# Patient Record
Sex: FEMALE | Race: WHITE
[De-identification: names, ages, dates, MRNs, and addresses within clinical notes are randomized per-mention and may not be internally consistent; named-entity substitution may affect disease eponyms.]

---

## 2017-03-18 ENCOUNTER — HOSPITAL ENCOUNTER (OUTPATIENT)
Dept: HOSPITAL 45 - C.RADBC | Age: 82
Discharge: HOME | End: 2017-03-18
Attending: INTERNAL MEDICINE
Payer: COMMERCIAL

## 2017-03-18 DIAGNOSIS — M16.10: ICD-10-CM

## 2017-03-18 DIAGNOSIS — M54.5: ICD-10-CM

## 2017-03-18 DIAGNOSIS — M81.0: ICD-10-CM

## 2017-03-18 DIAGNOSIS — Z00.00: Primary | ICD-10-CM

## 2017-03-18 LAB
ANION GAP SERPL CALC-SCNC: 9 MMOL/L (ref 3–11)
BUN SERPL-MCNC: 10 MG/DL (ref 7–18)
BUN/CREAT SERPL: 13.1 (ref 10–20)
CALCIUM SERPL-MCNC: 9.7 MG/DL (ref 8.5–10.1)
CHLORIDE SERPL-SCNC: 95 MMOL/L (ref 98–107)
CO2 SERPL-SCNC: 29 MMOL/L (ref 21–32)
CREAT SERPL-MCNC: 0.78 MG/DL (ref 0.6–1.2)
GLUCOSE SERPL-MCNC: 90 MG/DL (ref 70–99)
POTASSIUM SERPL-SCNC: 4.3 MMOL/L (ref 3.5–5.1)
SODIUM SERPL-SCNC: 133 MMOL/L (ref 136–145)

## 2017-03-18 NOTE — DIAGNOSTIC IMAGING REPORT
LUMBAR SPINE 5 VIEWS



HISTORY: Pain  M16.10 Hip [id number] Lower back [id number]



COMPARISON: None.



FINDINGS: There is no fracture. No subluxation. Moderate degenerative disc

changes throughout. Mild to moderate osteopenia.



IMPRESSION:  

Degenerative change. Osteopenia. No acute process.







Electronically signed by:  Evgeny Cardozo M.D.

3/18/2017 12:56 PM



Dictated Date/Time:  3/18/2017 12:56 PM

## 2017-03-18 NOTE — DIAGNOSTIC IMAGING REPORT
PELVIS/BILATERAL HIP 2 VIEWS



CLINICAL HISTORY: ARTHRITIS, HIP ARTHRITIS pain



COMPARISON STUDY:  None



FINDINGS: Severe degenerative change of both hips. No evidence for acetabular

protrusion. Sclerosis and subchondral cyst formation of the hips bilaterally.



IMPRESSION:  Severe degenerative change of both hips 









Electronically signed by:  Evgeny Cardozo M.D.

3/18/2017 1:01 PM



Dictated Date/Time:  3/18/2017 1:00 PM

## 2017-05-10 ENCOUNTER — HOSPITAL ENCOUNTER (OUTPATIENT)
Dept: HOSPITAL 45 - C.MAMM | Age: 82
Discharge: HOME | End: 2017-05-10
Attending: SURGERY
Payer: COMMERCIAL

## 2017-05-10 DIAGNOSIS — Z85.3: ICD-10-CM

## 2017-05-10 DIAGNOSIS — Z12.31: Primary | ICD-10-CM

## 2017-05-10 NOTE — MAMMOGRAPHY REPORT
BILATERAL DIGITAL SCREENING MAMMOGRAM TOMOSYNTHESIS WITH CAD: 5/10/2017

CLINICAL HISTORY: Asymptomatic. Personal history of breast cancer.  





TECHNIQUE:  Breast tomosynthesis in addition to standard 2D mammography was performed. Current study
 was also evaluated with a Computer Aided Detection (CAD) system.  



COMPARISON: Comparison is made to exams dated:  3/2/2016 mammogram, 5/1/2015 mammogram, 3/10/2015 ma
mmogram, 3/12/2014 mammogram, 6/26/2013 mammogram, and 7/3/2012 mammogram - [facility]
nter.   



BREAST COMPOSITION:  There are scattered areas of fibroglandular density in both breasts.  



FINDINGS:  No suspicious masses, calcifications, or areas of architectural distortion are noted in e
ither breast. There has been no significant interval change compared to prior exams.  There are stab
le postsurgical changes in the left upper outer quadrant from prior lumpectomy, including stable den
sity, architectural distortion, and surgical clips at the lumpectomy bed.  A linear scar marker amandeep
boubacar a scar on the left upper outer breast.  Scattered bilateral benign-appearing calcifications are 
not significantly changed.





IMPRESSION:  ACR BI-RADS CATEGORY 2: BENIGN

There is no mammographic evidence of malignancy. A 1 year screening mammogram is recommended.  The p
atient will receive written notification of the results.  





Approximately 10% of breast cancers are not detected with mammography. A negative mammographic repor
t should not delay biopsy if a clinically suggestive mass is present.



Ginger Ritter M.D.          

/:5/10/2017 12:31:34  



Attending Technologist: Jadyn Wilder RT(R)(M), [facility]

Imaging Technologist: Laurence Hickey RT(R)(M), [facility]

letter sent: Normal 1/2  

BI-RADS Code: ACR BI-RADS Category 2: Benign

## 2017-06-08 ENCOUNTER — HOSPITAL ENCOUNTER (OUTPATIENT)
Dept: HOSPITAL 45 - C.LAB1850 | Age: 82
Discharge: HOME | End: 2017-06-08
Attending: INTERNAL MEDICINE
Payer: COMMERCIAL

## 2017-06-08 DIAGNOSIS — M81.0: Primary | ICD-10-CM

## 2017-06-29 ENCOUNTER — HOSPITAL ENCOUNTER (EMERGENCY)
Dept: HOSPITAL 45 - C.EDA | Age: 82
Discharge: HOME | End: 2017-06-29
Payer: COMMERCIAL

## 2017-06-29 VITALS
BODY MASS INDEX: 26.93 KG/M2 | HEIGHT: 65.98 IN | HEIGHT: 65.98 IN | BODY MASS INDEX: 26.93 KG/M2 | WEIGHT: 167.55 LBS | WEIGHT: 167.55 LBS

## 2017-06-29 VITALS — DIASTOLIC BLOOD PRESSURE: 85 MMHG | TEMPERATURE: 98.24 F | SYSTOLIC BLOOD PRESSURE: 144 MMHG | OXYGEN SATURATION: 99 %

## 2017-06-29 DIAGNOSIS — I10: ICD-10-CM
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
## 2017-06-29 DIAGNOSIS — T78.40XA: Primary | ICD-10-CM

## 2017-06-29 DIAGNOSIS — Z96.659: ICD-10-CM

## 2017-06-29 DIAGNOSIS — Z85.3: ICD-10-CM

## 2017-06-29 DIAGNOSIS — G47.30: ICD-10-CM

## 2017-06-29 NOTE — EMERGENCY ROOM VISIT NOTE
History


First contact with patient:  01:36


Chief Complaint:  ALLERGIC REACTION


Stated Complaint:  ALLERGIC REACTION


Nursing Triage Summary:  


patient states prior to arrival her lower lip became swollen and numb and 


believed she was having an allergic reaction. patient states she had already 


taken children's benadryl as a part of her normal nighttime medication and 


patient states symptoms resolved priro to arrival. denies any respiratoy 


complaints at this time and respirations wnl . 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 


idiopathic angioedema.





History of Present Illness


The patient is a 82 year old female who presents to the Emergency Room with 

complaints of lip swelling that has now resolved.  She had a sudden onset of 

symptoms prior to arrival then took benadryl.  The patient noted using an oral 

moisturizer just prior to the event. She has had allergic reactions and 

angioedema in the past for the 30 yrs. She is currently feeling better only 

noting minimal left lower lip edema.  She denies pain.  Pt denies LOC, headache

, fevers, chills, diaphoresis, visual changes, neck pain, chest pain, breathing 

difficulties, nausea, vomiting, abdominal pain, back pain, melena, hematochezia

, urinary symptoms, numbness, weakness, lymphadenopathy, rash, or other 

complaints.





Review of Systems


See HPI for pertinent positives and negatives.  A total of ten systems were 

reviewed and were otherwise negative.





Past Medical/Surgical History


Medical Problems:


(1) Benign hypertension


(2) breast cancer


(3) idiopathic angioedema 


(4) Replacement of total knee joint


(5) Sleep apnea








Social History


Smoking Status:  Never Smoker


Marital Status:  single


Housing Status:  lives alone





Current/Historical Medications


Scheduled


Acetaminophen/Diphenhydramine (Tylenol Pm), 2 TABS PO HS


Ascorbic Acid (Vitamin C), 250 MG PO DAILY


Epinephrine (Epipen *), 0.3 MG IM UD


Ergocalciferol (Vitamin D), 1 CAP PO DAILY


Hydrochlorothiazide (Hctz), 12.5 MG PO DAILY


Loratadine (Claritin), 10 MG PO DAILY


Prednisone (Prednisone), 40 MG PO DAILY


Psyllium (Metamucil Powder), 1 PACK PO PRN UD


Verapamil (Calan), 80 MG PO TID PRN





Scheduled PRN


Acetaminophen Tab (Tylenol), 650 MG PO TID PRN





Allergies


Coded Allergies:  


     Aspirin (Verified  Allergy, Severe, TONGUE AND THROAT SWELLS, 10/26/16)


     Cephalexin (Verified  Allergy, Severe, swelling tongue and throat, 10/26/16

)


     Magnesium Salicylate (Verified  Allergy, Severe, ANAPHYLAXIS, 10/26/16)


     Omeprazole (Verified  Allergy, Severe, ANAPHYLAXIS, 10/26/16)


     Tramadol (Verified  Allergy, Severe, ANAPHYLAXIS, 10/26/16)


     Erythromycin (Verified  Allergy, Unknown, SWELLING AND RASH, 10/26/16)


     Oxycodone (Verified  Allergy, Unknown, UNKNOWN, 10/26/16)


     Tetracycline (Verified  Allergy, Unknown, SWELLING AND RASH, 10/26/16)


     Alendronate (Verified  Adverse Reaction, Unknown, very upset stomach, 10/26

/16)


Uncoded Allergies:  


     perfumes (Allergy, Unknown, can't breathe, 10/1/12)





Physical Exam


Vital Signs











  Date Time  Temp Pulse Resp B/P (MAP) Pulse Ox O2 Delivery O2 Flow Rate FiO2


 


6/29/17 01:16      Room Air  


 


6/29/17 01:16 36.8  20 144/85 99 Room Air  











Physical Exam


GENERAL: Awake, alert, well-appearing, in no distress


HENT: Normocephalic, atraumatic. Oropharynx unremarkable.  No edema or 

swelling.  Voice normal.


EYES: Normal conjunctiva. Sclera non-icteric.


NECK: Supple. No nuchal rigidity. FROM. No JVD.  No stridor.


RESPIRATORY: Clear to auscultation.


CARDIAC: Regular rate, normal rhythm. Extremities warm and well perfused. 

Pulses equal.


ABDOMEN: Soft, non-distended. No tenderness to palpation. No rebound or 

guarding. No masses.


RECTAL: Deferred.


MUSCULOSKELETAL: Chest examination reveals no tenderness. The back is 

symmetrical on inspection without obvious abnormality. There is no CVA 

tenderness to palpation. No joint edema. 


LOWER EXTREMITIES: Calves are equal size bilaterally and non-tender. No edema. 

No discoloration. 


NEURO: Normal sensorium. No sensory or motor deficits noted. 


SKIN: No rash or jaundice noted.





Medical Decision & Procedures


Medications Administered











 Medications


  (Trade)  Dose


 Ordered  Sig/Hanane


 Route  Start Time


 Stop Time Status Last Admin


Dose Admin


 


 Prednisone


  (PredniSONE TAB)  40 mg  NOW  STAT


 PO  6/29/17 01:43


 6/29/17 01:45 DC 6/29/17 01:49


40 MG











Medical Decision


Prior records/ancillary studies reviewed.





Triage Nursing notes reviewed and agree them.





The patient's history was concerning for possible allergic reaction. 





Differential diagnosis:


Etiologies such as allergic reaction, anaphylaxis, urticaria, Sanchez-Boyd 

syndrome, toxic epidermal necrolysis, angioedema, cellulitis, as well as others 

were entertained.  





Physical examination:


As above. 





ER treatment provided:


Continuous cardiac monitoring


Oral prednisone





On reassessment the patient felt normal.





Diagnostic interpretation by me:


None ordered








It appears the patient had a Very mild allergic reaction.  The above treatment 

did well.  After prolonged monitoring and frequent reassessments the patient 

did very well and symptoms did not return.


By the evaluation outlined above emergent etiologies such as airway compromise, 

Sanchez-Boyd syndrome, toxic epidermal necrolysis, erythema multiforme, 

cellulitis, as well as others were deemed relatively unlikely.  





The patient was informed about the findings as listed above.  All questions 

were answered and she was pleased with the treatment.  Return instructions were 

outlined and the patient was discharged in stable condition.  





Outpatient prescription management:


The patient has an EpiPen


prednisone





Referral:





The patient was referred back to her primary care physician for follow-up in 2-

3 days for a recheck of the current condition.





Impression





 Primary Impression:  


 Allergic reaction





Departure Information


Dispostion


Home / Self-Care





Prescriptions





Prednisone (Prednisone) 20 Mg Tab


40 MG PO DAILY for 2 Days, #4 TAB


   Prov: Wilbert Cadena MD         6/29/17





Referrals


Keon Gross M.D. (PCP)





Patient Instructions


My [facility]

## 2017-07-28 ENCOUNTER — HOSPITAL ENCOUNTER (INPATIENT)
Dept: HOSPITAL 45 - C.EDA | Age: 82
LOS: 2 days | Discharge: HOME | DRG: 69 | End: 2017-07-30
Attending: HOSPITALIST | Admitting: HOSPITALIST
Payer: COMMERCIAL

## 2017-07-28 VITALS
WEIGHT: 169.98 LBS | HEIGHT: 65.98 IN | WEIGHT: 169.98 LBS | BODY MASS INDEX: 27.32 KG/M2 | BODY MASS INDEX: 27.32 KG/M2 | HEIGHT: 65.98 IN

## 2017-07-28 DIAGNOSIS — M16.11: ICD-10-CM

## 2017-07-28 DIAGNOSIS — Z87.891: ICD-10-CM

## 2017-07-28 DIAGNOSIS — E87.5: ICD-10-CM

## 2017-07-28 DIAGNOSIS — I10: ICD-10-CM

## 2017-07-28 DIAGNOSIS — G47.00: ICD-10-CM

## 2017-07-28 DIAGNOSIS — E78.5: ICD-10-CM

## 2017-07-28 DIAGNOSIS — G45.9: Primary | ICD-10-CM

## 2017-07-28 DIAGNOSIS — F41.9: ICD-10-CM

## 2017-07-28 LAB
ALP SERPL-CCNC: 94 U/L (ref 45–117)
ALT SERPL-CCNC: 16 U/L (ref 12–78)
ANION GAP SERPL CALC-SCNC: 8 MMOL/L (ref 3–11)
APPEARANCE UR: CLEAR
AST SERPL-CCNC: 13 U/L (ref 15–37)
BASOPHILS # BLD: 0.02 K/UL (ref 0–0.2)
BASOPHILS NFR BLD: 0.3 %
BILIRUB UR-MCNC: (no result) MG/DL
BUN SERPL-MCNC: 12 MG/DL (ref 7–18)
BUN/CREAT SERPL: 13.8 (ref 10–20)
CALCIUM SERPL-MCNC: 9.2 MG/DL (ref 8.5–10.1)
CHLORIDE SERPL-SCNC: 94 MMOL/L (ref 98–107)
CKMB/CK RATIO: 2.3 (ref 0–3)
CO2 SERPL-SCNC: 28 MMOL/L (ref 21–32)
COLOR UR: YELLOW
COMPLETE: YES
CREAT CL PREDICTED SERPL C-G-VRATE: 55.3 ML/MIN
CREAT SERPL-MCNC: 0.86 MG/DL (ref 0.6–1.2)
EOSINOPHIL NFR BLD AUTO: 272 K/UL (ref 130–400)
GLUCOSE SERPL-MCNC: 114 MG/DL (ref 70–99)
HCT VFR BLD CALC: 38.6 % (ref 37–47)
IG%: 0.2 %
IMM GRANULOCYTES NFR BLD AUTO: 36.9 %
INR PPP: 1 (ref 0.9–1.1)
LYMPHOCYTES # BLD: 2.26 K/UL (ref 1.2–3.4)
MANUAL MICROSCOPIC REQUIRED?: NO
MCH RBC QN AUTO: 31.7 PG (ref 25–34)
MCHC RBC AUTO-ENTMCNC: 35 G/DL (ref 32–36)
MCV RBC AUTO: 90.6 FL (ref 80–100)
MONOCYTES NFR BLD: 9.1 %
NEUTROPHILS # BLD AUTO: 1.5 %
NEUTROPHILS NFR BLD AUTO: 52 %
NITRITE UR QL STRIP: (no result)
PARTIAL THROMBOPLASTIN RATIO: 1.3
PH UR STRIP: 8.5 [PH] (ref 4.5–7.5)
PMV BLD AUTO: 9.1 FL (ref 7.4–10.4)
POTASSIUM SERPL-SCNC: 4.1 MMOL/L (ref 3.5–5.1)
PROTHROMBIN TIME: 10.7 SECONDS (ref 9–12)
RBC # BLD AUTO: 4.26 M/UL (ref 4.2–5.4)
REVIEW REQ?: NO
SODIUM SERPL-SCNC: 130 MMOL/L (ref 136–145)
SP GR UR STRIP: 1.01 (ref 1–1.03)
URINE BILL WITH OR WITHOUT MIC: (no result)
UROBILINOGEN UR-MCNC: (no result) MG/DL
WBC # BLD AUTO: 6.13 K/UL (ref 4.8–10.8)
ZZUR CULT IF INDIC CLEAN CATCH: NO

## 2017-07-28 RX ADMIN — SODIUM CHLORIDE SCH MLS/HR: 900 INJECTION, SOLUTION INTRAVENOUS at 21:34

## 2017-07-28 NOTE — EMERGENCY ROOM VISIT NOTE
History


Report prepared by Onofre:  Valeria Mcleod


Under the Supervision of:  Dr. Trevor Madrigal D.O.


First contact with patient:  21:03


Chief Complaint:  TIA


Stated Complaint:  TIA SYMPTOMS





History of Present Illness


The patient is an 82 year old female who presents to the Emergency Room with 

complaints of improving left sided weakness which started at 1900 today. The 

patient started having difficulty walking at 1900. Her left leg seemed to be 

dragging behind her. She waited to see if the symptoms would resolve, but they 

did not so she called EMS. Her symptoms had begun to improve by the time EMS 

arrived. She reports some stiffness in her left hand, numbness in her left lip, 

dizziness, lightheadedness, and headache. She reports feeling "fuzzy" in the 

head. She always has some pain in her left leg because of her knee replacement. 

She denies any swelling in her legs. She has difficulty with her right hip and 

will have a hip replacement in the future. She has a history of aortic aneurysm

, high cholesterol, and sleep apnea. She denies any history of stroke. She wore 

a heart monitor a couple months ago which was normal. She is not on any blood 

thinners.





   Source of History:  patient


   Onset:  1900 today


   Position:  other (left sided)


   Quality:  other (weakness)


   Timing:  other (improving)


   Associated Symptoms:  + numbness (left lip)


Note:


Pt reports trouble walking, left leg dragging, stiffness in left hand, dizziness

, lightheadedness, fuzzy in the head. Pt denies leg swelling.





Review of Systems


See HPI for pertinent positives & negatives. A total of 10 systems reviewed and 

were otherwise negative.





Past Medical & Surgical


Medical Problems:


(1) Benign hypertension


(2) breast cancer


(3) idiopathic angioedema 


(4) Replacement of total knee joint


(5) Sleep apnea








Family History


Noncontributory secondary to age.





Social History


Smoking Status:  Former Smoker


Marital Status:  


Housing Status:  lives alone





Current/Historical Medications


Scheduled


Azelastine Hcl (Astelin Nasal Spray), 2 SPRAYS DIEGO BID


Calcium (Calcium), 1 TAB PO DAILY


Cholecalciferol (Vitamin D3), 2,000 INTER.UNIT PO DAILY


Glucosamine-Chondroitin-Vit C- (Glucosamine Chondroitin), 1 TAB PO DAILY


Hydrochlorothiazide (Hctz), 12.5 MG PO DAILY


Loratadine (Claritin), 10 MG PO DAILY


Timolol Maleate (Timolol Maleate), 1 DROP OPB QAM





Scheduled PRN


Acetaminophen Tab (Tylenol), 650 MG PO TID PRN for Pain


Epinephrine (Epipen), 0.3 MG IM UD PRN for ALLERGIC REACTION





Allergies


Coded Allergies:  


     Aspirin (Verified  Allergy, Severe, TONGUE AND THROAT SWELLS, 10/26/16)


     Cephalexin (Verified  Allergy, Severe, swelling tongue and throat, 10/26/16

)


     Magnesium Salicylate (Verified  Allergy, Severe, ANAPHYLAXIS, 10/26/16)


     Omeprazole (Verified  Allergy, Severe, ANAPHYLAXIS, 10/26/16)


     Tramadol (Verified  Allergy, Severe, ANAPHYLAXIS, 10/26/16)


     Erythromycin (Verified  Allergy, Unknown, SWELLING AND RASH, 10/26/16)


     Oxycodone (Verified  Allergy, Unknown, UNKNOWN, 10/26/16)


     Tetracycline (Verified  Allergy, Unknown, SWELLING AND RASH, 10/26/16)


     Alendronate (Verified  Adverse Reaction, Unknown, very upset stomach, 10/26

/16)


Uncoded Allergies:  


     perfumes (Allergy, Unknown, can't breathe, 10/1/12)





Physical Exam


Vital Signs











  Date Time  Temp Pulse Resp B/P (MAP) Pulse Ox O2 Delivery O2 Flow Rate FiO2


 


7/28/17 22:56  64 20 171/89 98 Room Air  


 


7/28/17 21:21      Room Air  


 


7/28/17 20:55  86      


 


7/28/17 20:48 37.1 65 18 149/71 97 Room Air  











Physical Exam


GENERAL:  Patient is awake, alert, and in no acute distress. Patient is resting 

comfortably and showing no signs of anxiety


EYES: The conjunctivae are clear.  The pupils are round and reactive. 


EARS, NOSE, MOUTH AND THROAT: The nose is without any evidence of any 

deformity. Mucous membranes are moist tongue is midline 


NECK: The neck is nontender and supple.


RESPIRATORY: Normal respiratory effort is noted there is no evidence of 

wheezing rhonchi or rales


CARDIOVASCULAR:  Regular rate and rhythm noted there no murmurs rubs or gallops 

normal S1 normal S2 


GASTROINTESTINAL: The abdomen is soft. Bowel sounds are present in all 

quadrants. Abdomen is nontender


MUSCULOSKELETAL/EXTREMITIES: There is no evidence of gross deformity full range 

of motion is noted in the hips and shoulders


SKIN: There is pedal edema bilaterally.


NEUROLOGIC:  Patient is awake alert and oriented x3, no facial droop was 

appreciated, subtle drift in the left upper extremity, but  strength is 

symmetric, strength in lower extremities diminished but appearing symmetric.





Medical Decision & Procedures


ER Provider


Diagnostic Interpretation:


X-ray results as stated below per interpretation by me and the radiologist. 

Radiology results as stated below per my review and radiologist interpretation:





CHEST ONE VIEW PORTABLE





CLINICAL HISTORY: Stroke mental status change





COMPARISON STUDY:  11/11/2012





FINDINGS: The bones soft tissues and hemidiaphragms are normal. The


cardiomediastinal silhouette is normal. The lungs are clear. The pulmonary


vasculature is normal. 





IMPRESSION:  Negative chest. 





The above report was generated using voice recognition software.  It may contain


grammatical, syntax or spelling errors.





Electronically signed by:  Evgeny Cardozo M.D.


7/28/2017 9:30 PM





Dictated Date/Time:  7/28/2017 9:30 PM








HEAD WITHOUT CONTRAST (CT)





CT DOSE: 638.56 mGycm





HISTORY: Mental status change  Stroke





TECHNIQUE: Multiaxial CT images of the head were performed without the use of


intravenous contrast.  A dose lowering technique was utilized adhering to the


principles of ALARA.








Comparison: None.





Findings: The paranasal sinuses and mastoid air cells are clear. Mild chronic


small vessel change of aging. Ventricular system is midline. No evidence for


acute intracranial hemorrhage.





Impression:


Age-related change. No acute process. 





The above report was generated using voice recognition software.  It may contain


grammatical, syntax or spelling errors.





Electronically signed by:  Evgeny Cardozo M.D.


7/28/2017 9:54 PM





Dictated Date/Time:  7/28/2017 9:52 PM





Laboratory Results











Test


  7/28/17


20:45 7/28/17


21:34 7/28/17


22:00


 


Prothrombin Time


  10.7 SECONDS


(9.0-12.0) 


  


 


 


Prothromb Time International


Ratio 1.0 (0.9-1.1) 


  


  


 


 


Activated Partial


Thromboplast Time 32.5 SECONDS


(21.0-31.0) 


  


 


 


Partial Thromboplastin Ratio 1.3   


 


Estimated Average Glucose 114 mg/dl   


 


Hemoglobin A1c


  5.6 %


(4.5-5.6) 


  


 


 


Total Bilirubin


  0.6 mg/dl


(0.2-1) 


  


 


 


Direct Bilirubin


  0.1 mg/dl


(0-0.2) 


  


 


 


Aspartate Amino Transf


(AST/SGOT) 13 U/L (15-37) 


  


  


 


 


Alanine Aminotransferase


(ALT/SGPT) 16 U/L (12-78) 


  


  


 


 


Alkaline Phosphatase


  94 U/L


() 


  


 


 


Total Protein


  7.5 gm/dl


(6.4-8.2) 


  


 


 


Albumin


  3.7 gm/dl


(3.4-5.0) 


  


 


 


Bedside Prothrombin Time INR  1.0 (0.9-1.1)  


 


Urine Color   YELLOW 


 


Urine Appearance   CLEAR (CLEAR) 


 


Urine pH   8.5 (4.5-7.5) 


 


Urine Specific Gravity


  


  


  1.010


(1.000-1.030)


 


Urine Protein   NEG (NEG) 


 


Urine Glucose (UA)   NEG (NEG) 


 


Urine Ketones   NEG (NEG) 


 


Urine Occult Blood   NEG (NEG) 


 


Urine Nitrite   NEG (NEG) 


 


Urine Bilirubin   NEG (NEG) 


 


Urine Urobilinogen   NEG (NEG) 


 


Urine Leukocyte Esterase   SMALL (NEG) 


 


Urine WBC (Auto)   1-5 /hpf (0-5) 


 


Urine RBC (Auto)   0-4 /hpf (0-4) 


 


Urine Hyaline Casts (Auto)   0 /lpf (0-5) 


 


Urine Epithelial Cells (Auto)


  


  


  10-20 /lpf


(0-5)


 


Urine Bacteria (Auto)   NEG (NEG) 





Laboratory results per my review.





Medications Administered











 Medications


  (Trade)  Dose


 Ordered  Sig/Hanane


 Route  Start Time


 Stop Time Status Last Admin


Dose Admin


 


 Sodium Chloride  1,000 ml @ 


 50 mls/hr  Q20H


 IV  7/28/17 21:14


 7/29/17 18:13 DC 7/29/17 17:42


50 MLS/HR











ECG


Indication:  weakness


Rate (beats per minute):  70


Rhythm:  normal sinus


Findings:  no ectopy, other (no acute ST segment abnormalities)


Comparison ECG Date:  13-Nov-2012


Change:  no significant change





ED Course


2113: The patient was evaluated in room A11B. A complete history and physical 

examination were performed. 





2114: NSS 1000 ml @ 50 mls/hr IV. 





2214: Upon reevaluation, the patient is resting comfortably. I discussed 

results and treatment plan with her. She verbalizes agreement and 

understanding. The patient will be evaluated for further management and care. 





2243: I discussed the patient's case with Dr. Matos, [facility] hospitalist. 

The patient will be evaluated for further management.





Medical Decision


Prior records/ancillary studies reviewed and summarized above.


Nursing notes reviewed.


The patient's history was concerning for weakness.





Differential diagnosis:


Etiologies such as metabolic, infection, hypo/hyperglycemia, electrolyte 

abnormalities, cardiac sources, intracerebral event, toxicologic, neurologic, 

as well as others were entertained. 





The patient is an 82-year-old female who presented to the emergency department 

for an evaluation of left-sided weakness. The patient had significant 

improvement of her symptoms. I discussed the patient's laboratory and 

radiographic studies with her. The patient does not appear to be a candidate 

for TPA at this time. I discussed the patient's condition with the on-call 

[facility] hospitalist group. She may require further evaluation as an 

inpatient to determine the cause of her symptoms. The patient was agreeable to 

this plan.





Medication Reconcilliation


Current Medication List:  was personally reviewed by me





Blood Pressure Screening


Patient's blood pressure:  Elevated blood pressure


Blood pressure disposition:  Elevated BP felt to be situational





Consults


Time Called:  2230


Consulting Physician:  Dr. Matos, [facility] hospitalist


Returned Call:  2243


I discussed the patient's case with him. The patient will be evaluated for 

further management.





Impression





 Primary Impression:  


 Left-sided weakness


 Additional Impression:  


 Hyponatremia





Scribe Attestation


The scribe's documentation has been prepared under my direction and personally 

reviewed by me in its entirety. I confirm that the note above accurately 

reflects all work, treatment, procedures, and medical decision making performed 

by me.





Departure Information


Dispostion


Being Evaluated By Hospitalist





Referrals


Keon Gross M.D. (PCP)





Patient Instructions


My [facility]





Problem Qualifiers

## 2017-07-28 NOTE — DIAGNOSTIC IMAGING REPORT
CHEST ONE VIEW PORTABLE



CLINICAL HISTORY: Stroke mental status change



COMPARISON STUDY:  11/11/2012



FINDINGS: The bones soft tissues and hemidiaphragms are normal. The

cardiomediastinal silhouette is normal. The lungs are clear. The pulmonary

vasculature is normal. 



IMPRESSION:  Negative chest. 











The above report was generated using voice recognition software.  It may contain

grammatical, syntax or spelling errors.









Electronically signed by:  Evgeny Cardozo M.D.

7/28/2017 9:30 PM



Dictated Date/Time:  7/28/2017 9:30 PM

## 2017-07-28 NOTE — DIAGNOSTIC IMAGING REPORT
HEAD WITHOUT CONTRAST (CT)



CT DOSE: 638.56 mGycm



HISTORY: Mental status change  Stroke



TECHNIQUE: Multiaxial CT images of the head were performed without the use of

intravenous contrast.  A dose lowering technique was utilized adhering to the

principles of ALARA.





Comparison: None.



Findings: The paranasal sinuses and mastoid air cells are clear. Mild chronic

small vessel change of aging. Ventricular system is midline. No evidence for

acute intracranial hemorrhage.



Impression:

Age-related change. No acute process. 











The above report was generated using voice recognition software.  It may contain

grammatical, syntax or spelling errors.







Electronically signed by:  Evgeny Cardozo M.D.

7/28/2017 9:54 PM



Dictated Date/Time:  7/28/2017 9:52 PM

## 2017-07-29 VITALS
TEMPERATURE: 98.06 F | DIASTOLIC BLOOD PRESSURE: 95 MMHG | SYSTOLIC BLOOD PRESSURE: 185 MMHG | HEART RATE: 72 BPM | OXYGEN SATURATION: 97 %

## 2017-07-29 VITALS
TEMPERATURE: 98.24 F | SYSTOLIC BLOOD PRESSURE: 144 MMHG | HEART RATE: 58 BPM | OXYGEN SATURATION: 97 % | DIASTOLIC BLOOD PRESSURE: 57 MMHG

## 2017-07-29 VITALS — OXYGEN SATURATION: 98 %

## 2017-07-29 VITALS
TEMPERATURE: 97.88 F | DIASTOLIC BLOOD PRESSURE: 75 MMHG | OXYGEN SATURATION: 98 % | HEART RATE: 66 BPM | SYSTOLIC BLOOD PRESSURE: 122 MMHG

## 2017-07-29 VITALS
TEMPERATURE: 98.06 F | DIASTOLIC BLOOD PRESSURE: 71 MMHG | SYSTOLIC BLOOD PRESSURE: 117 MMHG | OXYGEN SATURATION: 96 % | HEART RATE: 70 BPM

## 2017-07-29 VITALS
SYSTOLIC BLOOD PRESSURE: 143 MMHG | DIASTOLIC BLOOD PRESSURE: 86 MMHG | TEMPERATURE: 98.42 F | HEART RATE: 58 BPM | OXYGEN SATURATION: 96 %

## 2017-07-29 VITALS
DIASTOLIC BLOOD PRESSURE: 69 MMHG | OXYGEN SATURATION: 97 % | SYSTOLIC BLOOD PRESSURE: 122 MMHG | TEMPERATURE: 97.52 F | HEART RATE: 54 BPM

## 2017-07-29 VITALS — HEART RATE: 77 BPM | OXYGEN SATURATION: 96 %

## 2017-07-29 VITALS
HEART RATE: 64 BPM | SYSTOLIC BLOOD PRESSURE: 117 MMHG | TEMPERATURE: 97.88 F | DIASTOLIC BLOOD PRESSURE: 77 MMHG | OXYGEN SATURATION: 98 %

## 2017-07-29 VITALS — DIASTOLIC BLOOD PRESSURE: 75 MMHG | SYSTOLIC BLOOD PRESSURE: 121 MMHG

## 2017-07-29 LAB
ANION GAP SERPL CALC-SCNC: 3 MMOL/L (ref 3–11)
BASOPHILS # BLD: 0.02 K/UL (ref 0–0.2)
BASOPHILS NFR BLD: 0.4 %
BUN SERPL-MCNC: 8 MG/DL (ref 7–18)
BUN/CREAT SERPL: 11 (ref 10–20)
CALCIUM SERPL-MCNC: 9.3 MG/DL (ref 8.5–10.1)
CHLORIDE SERPL-SCNC: 102 MMOL/L (ref 98–107)
CHOLEST/HDLC SERPL: 4.5 {RATIO}
CKMB/CK RATIO: 1.9 (ref 0–3)
CKMB/CK RATIO: 2.5 (ref 0–3)
CO2 SERPL-SCNC: 32 MMOL/L (ref 21–32)
COMPLETE: YES
CREAT CL PREDICTED SERPL C-G-VRATE: 64.6 ML/MIN
CREAT SERPL-MCNC: 0.72 MG/DL (ref 0.6–1.2)
EOSINOPHIL NFR BLD AUTO: 232 K/UL (ref 130–400)
EST. AVERAGE GLUCOSE BLD GHB EST-MCNC: 114 MG/DL
GLUCOSE SERPL-MCNC: 98 MG/DL (ref 70–99)
GLUCOSE UR QL: 52 MG/DL
HCT VFR BLD CALC: 39.4 % (ref 37–47)
IG%: 0.2 %
IMM GRANULOCYTES NFR BLD AUTO: 34.3 %
KETONES UR QL STRIP: 152 MG/DL
LYMPHOCYTES # BLD: 1.87 K/UL (ref 1.2–3.4)
MCH RBC QN AUTO: 31.1 PG (ref 25–34)
MCHC RBC AUTO-ENTMCNC: 34.3 G/DL (ref 32–36)
MCV RBC AUTO: 90.8 FL (ref 80–100)
MONOCYTES NFR BLD: 9.5 %
NEUTROPHILS # BLD AUTO: 1.7 %
NEUTROPHILS NFR BLD AUTO: 53.9 %
NITRITE UR QL STRIP: 149 MG/DL (ref 0–150)
PH UR: 234 MG/DL (ref 0–200)
PMV BLD AUTO: 9 FL (ref 7.4–10.4)
POTASSIUM SERPL-SCNC: 3.8 MMOL/L (ref 3.5–5.1)
RBC # BLD AUTO: 4.34 M/UL (ref 4.2–5.4)
SODIUM SERPL-SCNC: 137 MMOL/L (ref 136–145)
VERY LOW DENSITY LIPOPROT CALC: 30 MG/DL
WBC # BLD AUTO: 5.45 K/UL (ref 4.8–10.8)

## 2017-07-29 RX ADMIN — ACETAMINOPHEN PRN MG: 325 TABLET ORAL at 16:15

## 2017-07-29 RX ADMIN — HEPARIN SODIUM SCH UNIT: 10000 INJECTION, SOLUTION INTRAVENOUS; SUBCUTANEOUS at 21:00

## 2017-07-29 RX ADMIN — ACETAMINOPHEN PRN MG: 325 TABLET ORAL at 02:04

## 2017-07-29 RX ADMIN — Medication SCH INTER.UNIT: at 07:58

## 2017-07-29 RX ADMIN — SODIUM CHLORIDE SCH MLS/HR: 900 INJECTION, SOLUTION INTRAVENOUS at 17:42

## 2017-07-29 RX ADMIN — ALUMINUM ZIRCONIUM TRICHLOROHYDREX GLY SCH EA: 0.2 STICK TOPICAL at 07:56

## 2017-07-29 RX ADMIN — Medication SCH MG: at 07:58

## 2017-07-29 RX ADMIN — ACETAMINOPHEN PRN MG: 325 TABLET ORAL at 11:21

## 2017-07-29 RX ADMIN — ACETAMINOPHEN PRN MG: 325 TABLET ORAL at 20:35

## 2017-07-29 RX ADMIN — ALUMINUM ZIRCONIUM TRICHLOROHYDREX GLY SCH EA: 0.2 STICK TOPICAL at 16:00

## 2017-07-29 RX ADMIN — TIMOLOL MALEATE SCH DROPS: 6.8 SOLUTION/ DROPS OPHTHALMIC at 07:58

## 2017-07-29 RX ADMIN — Medication SCH TAB: at 16:15

## 2017-07-29 RX ADMIN — Medication SCH TAB: at 07:57

## 2017-07-29 RX ADMIN — ACETAMINOPHEN PRN MG: 325 TABLET ORAL at 06:26

## 2017-07-29 NOTE — PROGRESS NOTE
Subjective


Date of Service:


Jul 29, 2017.


Subjective


Pt evaluation today including:  conversation w/ patient, physical exam, chart 

review, lab review, review of inpatient medication list


feeling better.  notes no other new numbness/weakness. notes shes pretty sure 

it was her hip all along - notes hip pain then foot/leg dragging, then thinks 

the trouble typingwasn't from weakness/numbness, just from anxiety about what 

might be going on.  lip tingling b/l but no unilateral numbness.  





discussed at length





discusssed lipids, +/- statin, discussed possible TIA vs hip issues followed by 

anxiety and +/- plavix (since asa allergy)





awaiting MRI





Problem List


Medical Problems:


(1) Hyponatremia


Status: Acute  





(2) Left-sided weakness


Status: Acute  











Review of Systems


all other ROS otherwise negative except for as above





Objective


Vital Signs











  Date Time  Temp Pulse Resp B/P (MAP) Pulse Ox O2 Delivery O2 Flow Rate FiO2


 


7/29/17 14:50 36.8 58 20 144/57 (86) 97 Room Air  


 


7/29/17 11:53 36.4 54 19 122/69 (86) 97 Room Air  


 


7/29/17 07:50 36.9 58 18 143/86 (105) 96 Room Air  


 


7/29/17 04:00     98 Room Air  


 


7/29/17 03:34 36.6 64 18 117/77 (90) 98 Room Air  


 


7/29/17 01:40 36.7 72 20 185/95 97 Room Air  


 


7/28/17 23:57  70 18 152/92 97 Room Air  


 


7/28/17 22:56  64 20 171/89 98 Room Air  


 


7/28/17 21:21      Room Air  


 


7/28/17 20:55  86      


 


7/28/17 20:48 37.1 65 18 149/71 97 Room Air  











Physical Exam


General Appearance:  no apparent distress


Eyes:  EOMI


ENT:  hearing grossly normal


Neck:  trachea midline


Respiratory/Chest:  no respiratory distress, no accessory muscle use


Neurologic/Psychiatric:  CNs II-XII nml as tested, no motor/sensory deficits, 

alert, normal mood/affect


Skin:  normal color, warm/dry





Laboratory Results





Last 24 Hours








Test


  7/28/17


20:45 7/28/17


21:34 7/28/17


22:00 7/29/17


05:31


 


White Blood Count 6.13 K/uL    5.45 K/uL 


 


Red Blood Count 4.26 M/uL    4.34 M/uL 


 


Hemoglobin 13.5 g/dL    13.5 g/dL 


 


Hematocrit 38.6 %    39.4 % 


 


Mean Corpuscular Volume 90.6 fL    90.8 fL 


 


Mean Corpuscular Hemoglobin 31.7 pg    31.1 pg 


 


Mean Corpuscular Hemoglobin


Concent 35.0 g/dl 


  


  


  34.3 g/dl 


 


 


Platelet Count 272 K/uL    232 K/uL 


 


Mean Platelet Volume 9.1 fL    9.0 fL 


 


Neutrophils (%) (Auto) 52.0 %    53.9 % 


 


Lymphocytes (%) (Auto) 36.9 %    34.3 % 


 


Monocytes (%) (Auto) 9.1 %    9.5 % 


 


Eosinophils (%) (Auto) 1.5 %    1.7 % 


 


Basophils (%) (Auto) 0.3 %    0.4 % 


 


Neutrophils # (Auto) 3.19 K/uL    2.94 K/uL 


 


Lymphocytes # (Auto) 2.26 K/uL    1.87 K/uL 


 


Monocytes # (Auto) 0.56 K/uL    0.52 K/uL 


 


Eosinophils # (Auto) 0.09 K/uL    0.09 K/uL 


 


Basophils # (Auto) 0.02 K/uL    0.02 K/uL 


 


RDW Standard Deviation 42.2 fL    42.2 fL 


 


RDW Coefficient of Variation 12.7 %    12.5 % 


 


Immature Granulocyte % (Auto) 0.2 %    0.2 % 


 


Immature Granulocyte # (Auto) 0.01 K/uL    0.01 K/uL 


 


Prothrombin Time 10.7 SECONDS    


 


Prothromb Time International


Ratio 1.0 


  


  


  


 


 


Activated Partial


Thromboplast Time 32.5 SECONDS 


  


  


  


 


 


Partial Thromboplastin Ratio 1.3    


 


Sodium Level 130 mmol/L    137 mmol/L 


 


Potassium Level 4.1 mmol/L    3.8 mmol/L 


 


Chloride Level 94 mmol/L    102 mmol/L 


 


Carbon Dioxide Level 28 mmol/L    32 mmol/L 


 


Anion Gap 8.0 mmol/L    3.0 mmol/L 


 


Blood Urea Nitrogen 12 mg/dl    8 mg/dl 


 


Creatinine 0.86 mg/dl    0.72 mg/dl 


 


Est Creatinine Clear Calc


Drug Dose 55.3 ml/min 


  


  


  64.6 ml/min 


 


 


Estimated GFR (


American) 73.4 


  


  


  91.0 


 


 


Estimated GFR (Non-


American 63.4 


  


  


  78.5 


 


 


BUN/Creatinine Ratio 13.8    11.0 


 


Random Glucose 114 mg/dl    98 mg/dl 


 


Estimated Average Glucose 114 mg/dl    


 


Hemoglobin A1c 5.6 %    


 


Calcium Level 9.2 mg/dl    9.3 mg/dl 


 


Total Bilirubin 0.6 mg/dl    


 


Direct Bilirubin 0.1 mg/dl    


 


Aspartate Amino Transf


(AST/SGOT) 13 U/L 


  


  


  


 


 


Alanine Aminotransferase


(ALT/SGPT) 16 U/L 


  


  


  


 


 


Alkaline Phosphatase 94 U/L    


 


Total Creatine Kinase 52 U/L    51 U/L 


 


Creatine Kinase MB 1.2 ng/ml    1.3 ng/ml 


 


Creatine Kinase MB Ratio 2.3    2.5 


 


Troponin I < 0.015 ng/ml    < 0.015 ng/ml 


 


Total Protein 7.5 gm/dl    


 


Albumin 3.7 gm/dl    


 


Bedside Prothrombin Time INR  1.0   


 


Urine Color   YELLOW  


 


Urine Appearance   CLEAR  


 


Urine pH   8.5  


 


Urine Specific Gravity   1.010  


 


Urine Protein   NEG  


 


Urine Glucose (UA)   NEG  


 


Urine Ketones   NEG  


 


Urine Occult Blood   NEG  


 


Urine Nitrite   NEG  


 


Urine Bilirubin   NEG  


 


Urine Urobilinogen   NEG  


 


Urine Leukocyte Esterase   SMALL  


 


Urine WBC (Auto)   1-5 /hpf  


 


Urine RBC (Auto)   0-4 /hpf  


 


Urine Hyaline Casts (Auto)   0 /lpf  


 


Urine Epithelial Cells (Auto)   10-20 /lpf  


 


Urine Bacteria (Auto)   NEG  


 


Triglycerides Level    149 mg/dl 


 


Cholesterol Level    234 mg/dl 


 


HDL Cholesterol    52 mg/dl 


 


LDL Cholesterol, Calculated    152 mg/dl 


 


VLDL Cholesterol, Calculated    30 mg/dl 


 


Cholesterol/HDL Ratio    4.5 


 


Test


  7/29/17


14:04 


  


  


 


 


Total Creatine Kinase 43 U/L    


 


Creatine Kinase MB 0.8 ng/ml    


 


Creatine Kinase MB Ratio 1.9    


 


Troponin I < 0.015 ng/ml    











Assessment and Plan


unilateral weakness


-TIA vs hip pain and anxiety


-she's reticent to start statin or plavix at this time


-if MRI (+) will start meds, if negative she wishes to wait to discuss w PCP





hyperlipidemia


-as above





HTN


-continue home meds


-outpatient follow up to titrate to goal//outpatient monitoring





hip pain / DJD 


-for NILDA soon





DVT proph


-heparin SQ

## 2017-07-29 NOTE — HISTORY AND PHYSICAL
History & Physical


Date & Time of Service:


Jul 29, 2017 at 02:32.  The patient was examined on 07/28/2017.


Chief Complaint:


Hyponatremia, Left Sided Weakness


Primary Care Physician:


Keon Gross M.D.


History of Present Illness


Source:  patient


The patient is an 82-year-old female who presents to the emergency department 

with complaint of left-sided weakness that began at 1900 hrs. today, where her 

left leg felt heavy and was dragging behind her, and left arm felt weak.  She 

called EMS after symptoms did not resolve, and was brought to the emergency 

department for assessment.  She also reports that her head felt fuzzy, and had 

some numbness in her left lip, dizziness, lightheadedness and a headache.  

While in the emergency department, her symptoms did significantly improve, but 

did have persistent left leg heaviness.  She is scheduled to undergo a right 

hip arthroplasty at the end of August.  She has not had any history of stroke, 

but does have a history of an aortic aneurysm, hypercholesterolemia and sleep 

apnea.  She reports having worn a heart monitor a few months ago due to 

palpitations which was normal.  She does not take aspirin due to sensitivity or 

related products.  She has not had any recent travel or sick exposures





Past Medical/Surgical History


Medical Problems:


(1) Benign hypertension


Status: Chronic  





(2) breast cancer


Status: Chronic  





(3) idiopathic angioedema 


Status: Chronic  





(4) Replacement of total knee joint


Status: Resolved  





(5) Sleep apnea


Status: Chronic  











Family History


Noncontributory





Social History


Smoking Status:  Former Smoker


Smokeless Tobacco Use:  No


Alcohol Use:  none


Drug Use:  none


Marital Status:  


Housing status:  lives alone


Occupational Status:  retired





Immunizations


History of Influenza Vaccine:  Yes


History of Tetanus Vaccine?:  Unknown


History of Pneumococcal:  Yes


History of Hepatitis B Vaccine:  Unknown





Multi-Drug Resistant Organisms


History of MDRO:  No





Allergies


Coded Allergies:  


     Aspirin (Verified  Allergy, Severe, TONGUE AND THROAT SWELLS, 10/26/16)


     Cephalexin (Verified  Allergy, Severe, swelling tongue and throat, 10/26/16

)


     Magnesium Salicylate (Verified  Allergy, Severe, ANAPHYLAXIS, 10/26/16)


     Omeprazole (Verified  Allergy, Severe, ANAPHYLAXIS, 10/26/16)


     Tramadol (Verified  Allergy, Severe, ANAPHYLAXIS, 10/26/16)


     Erythromycin (Verified  Allergy, Unknown, SWELLING AND RASH, 10/26/16)


     Oxycodone (Verified  Allergy, Unknown, UNKNOWN, 10/26/16)


     Tetracycline (Verified  Allergy, Unknown, SWELLING AND RASH, 10/26/16)


     Alendronate (Verified  Adverse Reaction, Unknown, very upset stomach, 10/26

/16)


Uncoded Allergies:  


     perfumes (Allergy, Unknown, can't breathe, 10/1/12)





Home Medications


Scheduled


Azelastine Hcl (Astelin Nasal Spray), 2 SPRAYS DIEGO BID


Calcium (Calcium), 1 TAB PO DAILY


Cholecalciferol (Vitamin D3), 2,000 INTER.UNIT PO DAILY


Glucosamine-Chondroitin-Vit C- (Glucosamine Chondroitin), 1 TAB PO DAILY


Hydrochlorothiazide (Hctz), 12.5 MG PO DAILY


Loratadine (Claritin), 10 MG PO DAILY


Timolol Maleate (Timolol Maleate), 1 DROP OPB QAM





Scheduled PRN


Acetaminophen Tab (Tylenol), 650 MG PO TID PRN for Pain


Epinephrine (Epipen), 0.3 MG IM UD PRN for ALLERGIC REACTION





Review of Systems


The patient denies chest pain, palpitations, shortness of breath, cough, lower 

extremity swelling, vision change, hearing change, sore throat, fevers, chills, 

sweats, weight change, fatigue, nausea, vomiting, abdominal pain, pelvic pain, 

blood in urine or stool, dysuria, urinary frequency or urgency,  memory loss, 

rash, abnormal bruising or bleeding, generalized weakness,  arthralgias or 

myalgias, back or neck pain, night sweats, or allergy symptoms.


The review of systems is otherwise negative other than for that already noted 

above, and at least 10 systems have been reviewed.





Physical Exam


Vital Signs











  Date Time  Temp Pulse Resp B/P (MAP) Pulse Ox O2 Delivery O2 Flow Rate FiO2


 


7/29/17 01:40 36.7 72 20 185/95 97 Room Air  


 


7/28/17 23:57  70 18 152/92 97 Room Air  


 


7/28/17 22:56  64 20 171/89 98 Room Air  


 


7/28/17 21:21      Room Air  


 


7/28/17 20:55  86      


 


7/28/17 20:48 37.1 65 18 149/71 97 Room Air  








The patient is awake, well-developed and adequately nourished, alert and 

oriented 3, normocephalic and atraumatic, lying in bed and in no acute 

distress.


HEENT--PERRL, EOMI, mucous membranes  and oropharynx dry.


Neck--supple, no JVD or bruits, thyroid normal, trachea midline, no adenopathy.


Heart--normal S1 and S2, no extra beats, no murmurs, rubs or gallops.


Lungs--clear bilaterally with good air movement, no respiratory distress, no 

accessory muscle use.


Abdomen--normal bowel sounds and soft, nontender and nondistended, no hernias 

or masses,  no organomegaly.


Extremities--no cyanosis, clubbing or edema. There are good distal pulses b/l.


Dermatologic--normal skin turgor, normal color, warm and dry, no abnormal lymph 

nodes, no rash.


Neurologic--cranial nerves II through XII grossly intact.  Left upper extremity 

4+/5 strength, left lower extremity with 4/5 strength.  Right upper lobe 

extremity strength normal.  Sensation normal bilaterally upper and lower 

extremities.


Rheumatologic--limited exam due to weak left side.


Psychiatric--normal affect.





Diagnostics


Laboratory Results





Results Past 24 Hours








Test


  7/28/17


20:45 7/28/17


21:34 7/28/17


22:00 Range/Units


 


 


White Blood Count 6.13   4.8-10.8  K/uL


 


Red Blood Count 4.26   4.2-5.4  M/uL


 


Hemoglobin 13.5   12.0-16.0  g/dL


 


Hematocrit 38.6   37-47  %


 


Mean Corpuscular Volume 90.6     fL


 


Mean Corpuscular Hemoglobin 31.7   25-34  pg


 


Mean Corpuscular Hemoglobin


Concent 35.0


  


  


  32-36  g/dl


 


 


Platelet Count 272   130-400  K/uL


 


Mean Platelet Volume 9.1   7.4-10.4  fL


 


Neutrophils (%) (Auto) 52.0    %


 


Lymphocytes (%) (Auto) 36.9    %


 


Monocytes (%) (Auto) 9.1    %


 


Eosinophils (%) (Auto) 1.5    %


 


Basophils (%) (Auto) 0.3    %


 


Neutrophils # (Auto) 3.19   1.4-6.5  K/uL


 


Lymphocytes # (Auto) 2.26   1.2-3.4  K/uL


 


Monocytes # (Auto) 0.56   0.11-0.59  K/uL


 


Eosinophils # (Auto) 0.09   0-0.5  K/uL


 


Basophils # (Auto) 0.02   0-0.2  K/uL


 


RDW Standard Deviation 42.2   36.4-46.3  fL


 


RDW Coefficient of Variation 12.7   11.5-14.5  %


 


Immature Granulocyte % (Auto) 0.2    %


 


Immature Granulocyte # (Auto) 0.01   0.00-0.02  K/uL


 


Prothrombin Time


  10.7


  


  


  9.0-12.0


SECONDS


 


Prothromb Time International


Ratio 1.0


  


  


  0.9-1.1  


 


 


Activated Partial


Thromboplast Time 32.5


  


  


  21.0-31.0


SECONDS


 


Partial Thromboplastin Ratio 1.3    


 


Sodium Level 130   136-145  mmol/L


 


Potassium Level 4.1   3.5-5.1  mmol/L


 


Chloride Level 94     mmol/L


 


Carbon Dioxide Level 28   21-32  mmol/L


 


Anion Gap 8.0   3-11  mmol/L


 


Blood Urea Nitrogen 12   7-18  mg/dl


 


Creatinine


  0.86


  


  


  0.60-1.20


mg/dl


 


Est Creatinine Clear Calc


Drug Dose 55.3


  


  


   ml/min


 


 


Estimated GFR (


American) 73.4


  


  


   


 


 


Estimated GFR (Non-


American 63.4


  


  


   


 


 


BUN/Creatinine Ratio 13.8   10-20  


 


Random Glucose 114   70-99  mg/dl


 


Calcium Level 9.2   8.5-10.1  mg/dl


 


Total Bilirubin 0.6   0.2-1  mg/dl


 


Direct Bilirubin 0.1   0-0.2  mg/dl


 


Aspartate Amino Transf


(AST/SGOT) 13


  


  


  15-37  U/L


 


 


Alanine Aminotransferase


(ALT/SGPT) 16


  


  


  12-78  U/L


 


 


Alkaline Phosphatase 94     U/L


 


Total Creatine Kinase 52     U/L


 


Creatine Kinase MB 1.2   0.5-3.6  ng/ml


 


Creatine Kinase MB Ratio 2.3   0-3.0  


 


Troponin I < 0.015   0-0.045  ng/ml


 


Total Protein 7.5   6.4-8.2  gm/dl


 


Albumin 3.7   3.4-5.0  gm/dl


 


Bedside Prothrombin Time INR  1.0  0.9-1.1  


 


Urine Color   YELLOW  


 


Urine Appearance   CLEAR CLEAR  


 


Urine pH   8.5 4.5-7.5  


 


Urine Specific Gravity   1.010 1.000-1.030  


 


Urine Protein   NEG NEG  


 


Urine Glucose (UA)   NEG NEG  


 


Urine Ketones   NEG NEG  


 


Urine Occult Blood   NEG NEG  


 


Urine Nitrite   NEG NEG  


 


Urine Bilirubin   NEG NEG  


 


Urine Urobilinogen   NEG NEG  


 


Urine Leukocyte Esterase   SMALL NEG  


 


Urine WBC (Auto)   1-5 0-5  /hpf


 


Urine RBC (Auto)   0-4 0-4  /hpf


 


Urine Hyaline Casts (Auto)   0 0-5  /lpf


 


Urine Epithelial Cells (Auto)   10-20 0-5  /lpf


 


Urine Bacteria (Auto)   NEG NEG  











Diagnostic Radiology


                                                                               

                                                                 


Patient Name: JOSE ANTONIO ÁLVAREZ                               Unit Number:  

[id number]                  


 





 











Dictated: 07/28/17 2152


 


Transcribed: 07/28/17 2152


 


MS


 


Printed Date/Time: [~ rep prt dt]/[~ rep prt tm]








 [~ rep ct labl] - [~ rep ct ivnm]


 





   [facility]


 Radiology Department


 [place] [id number] [phone]





 











Dictated: 07/28/17 2152


 


Transcribed: 07/28/17 2152


 


MS


 


Printed Date/Time: [~ rep prt dt]/[~ rep prt tm]








 [~ rep ct labl] - [~ rep ct ivnm]


 








HEAD WITHOUT CONTRAST (CT)





CT DOSE: 638.56 mGycm





HISTORY: Mental status change  Stroke





TECHNIQUE: Multiaxial CT images of the head were performed without the use of


intravenous contrast.  A dose lowering technique was utilized adhering to the


principles of ALARA.








Comparison: None.





Findings: The paranasal sinuses and mastoid air cells are clear. Mild chronic


small vessel change of aging. Ventricular system is midline. No evidence for


acute intracranial hemorrhage.





Impression:


Age-related change. No acute process. 

















The above report was generated using voice recognition software.  It may contain


grammatical, syntax or spelling errors.











Electronically signed by:  Evgeny Cardozo M.D.


7/28/2017 9:54 PM





Dictated Date/Time:  7/28/2017 9:52 PM





 The status of this report is Signed.   


 Draft = Not yet reviewed or approved by Radiologist.  


 Signed = Reviewed and approved by Radiologist.


<AttendingPhy></AttendingPhy> <FamilyPhy>Keon Gross M.D.</FamilyPhy> <

PrimaryPhy>Keon Gross M.D.</PrimaryPhy> <UnitNumber>[id number]</UnitNumber> 

<VisitNumber>[id number]</VisitNumber> <PatientName>JOSE ANTONIO ÁLVAREZ</

PatientName> <DateOfBirth>1935</DateOfBirth> <Location>[facility]</Location> <

ServiceDate>07/28/17</ServiceDate> <MNE>ESINDI</MNE> <OrderingPhy>Trevor Madrigal D.O.</OrderingPhy> <OrderingPhyMNE>f rep ord dr katz</OrderingPhyMNE> 

<DictatingPhyMNE>f rep dict dr katz</DictatingPhyMNE> <CCListMNE>f rep ct mne</

CCListMNE> <AdmittingPhyMNE>f pt admit dr katz</AdmittingPhyMNE> <AttendingPhyMNE

>f pt attend dr katz</AttendingPhyMNE>


<ConsultingPhyMNE>f pt consult dr katz</ConsultingPhyMNE> <FamilyPhyMNE>f pt fam 

dr katz</FamilyPhyMNE> <OtherPhyMNE>f pt other dr katz</OtherPhyMNE> <

PrimaryPhyMNE>f pt prim care dr katz</PrimaryPhyMNE> <ReferringPhyMNE>f pt 

referring dr katz</ReferringPhyMNE>


                                                                               

                                                                 


Patient Name: JOSE ANTONIO ÁLVAREZ                               Unit Number:  

[id number]                  


 





 











Dictated: 07/28/17 2130


 


Transcribed: 07/28/17 2130


 


MS


 


Printed Date/Time: [~ rep prt dt]/[~ rep prt tm]








 [~ rep ct labl] - [~ rep ct ivnm]


 





   [facility]


 Radiology Department


 [place] [address] [phone]





 











Dictated: 07/28/17 2130


 


Transcribed: 07/28/17 2130


 


MS


 


Printed Date/Time: [~ rep prt dt]/[~ rep prt tm]








 [~ rep ct labl] - [~ rep ct ivnm]


 








[~ rep ct add3]]


CHEST ONE VIEW PORTABLE





CLINICAL HISTORY: Stroke mental status change





COMPARISON STUDY:  11/11/2012





FINDINGS: The bones soft tissues and hemidiaphragms are normal. The


cardiomediastinal silhouette is normal. The lungs are clear. The pulmonary


vasculature is normal. 





IMPRESSION:  Negative chest. 

















The above report was generated using voice recognition software.  It may contain


grammatical, syntax or spelling errors.














Electronically signed by:  Evgeny Cardozo M.D.


7/28/2017 9:30 PM





Dictated Date/Time:  7/28/2017 9:30 PM





 The status of this report is Signed.   


 Draft = Not yet reviewed or approved by Radiologist.  


 Signed = Reviewed and approved by Radiologist.


<AttendingPhy></AttendingPhy> <FamilyPhy>Keon Gross M.D.</FamilyPhy> <

PrimaryPhy>Keon Gross M.D.</PrimaryPhy> <UnitNumber>[id number]</UnitNumber> 

<VisitNumber>[id number]</VisitNumber> <PatientName>PREETIJOSE ANTONIO PAWEL</

PatientName> <DateOfBirth>1935</DateOfBirth> <Location>[facility]</Location> <

ServiceDate>07/28/17</ServiceDate> <MNE>ESINDI</MNE> <OrderingPhy>Trevor Madrigal D.O.</OrderingPhy> <OrderingPhyMNE>f rep ord dr katz</OrderingPhyMNE> 

<DictatingPhyMNE>f rep dict dr katz</DictatingPhyMNE> <CCListMNE>f rep ct mne</

CCListMNE> <AdmittingPhyMNE>f pt admit dr katz</AdmittingPhyMNE> <AttendingPhyMNE

>f pt attend dr katz</AttendingPhyMNE>


<ConsultingPhyMNE>f pt consult dr katz</ConsultingPhyMNE> <FamilyPhyMNE>f pt fam 

dr katz</FamilyPhyMNE> <OtherPhyMNE>f pt other dr katz</OtherPhyMNE> <

PrimaryPhyMNE>f pt prim care dr katz</PrimaryPhyMNE> <ReferringPhyMNE>f pt 

referring dr katz</ReferringPhyMNE>





EKG


EKG shows normal sinus rhythm at 70 bpm, no acute ST-T changes, and no change 

since 11/13/2012.





Impression


Assessment and Plan


Left arm and left leg weakness persistent but improving/resolved headache, 

lightheadedness, dizziness--The patient will be admitted to telemetry for 

serial cardiac enzymes, cardiac rhythm monitoring and a 2-D echocardiogram with 

Dopplers.  We will order an MRI of the brain combo, MRA of the neck combo, and 

MRA of head without contrast.  Patient is unable to take aspirin.  We'll check 

a fasting lipid profile and hemoglobin A1c.





Hyponatremia/hypertension/migraine--hold HCTZ 12.5 mg by mouth daily.  Continue 

verapamil 80 mg by mouth 3 times a day when necessary headache.





Gen. allergy--continue loratadine 10 mg by mouth daily.





Insomnia--continue acetaminophen/diphenhydramine 2 tablets by mouth at bedtime 

when necessary.





Level of Care


Telemetry





Advanced Directives


Existing Advance Directive:  No


Existing Living Will:  No


Existing Power of :  No





Resuscitation Status


FULL RESUSCITATION





VTE Prophylaxis


VTE Risk Assessment Done? Y/N:  Yes


Risk Level:  Moderate


Given or contraindicated:  SCD's

## 2017-07-29 NOTE — DIAGNOSTIC IMAGING REPORT
MRI OF THE BRAIN WITHOUT CONTRAST



CLINICAL HISTORY: Stroke.    



COMPARISON STUDY: Head CT July 28, 2017.



TECHNIQUE: Utilizing a 1.5 Goldie magnet and dedicated coil, multiplanar,

multiecho imaging of the brain was performed without IV contrast. 



FINDINGS: There are no areas of restricted diffusion. No acute intracranial

hemorrhage, midline shift or mass effect is present. There is mild atrophy.

Ventricular system is unremarkable for age. The basilar cisterns are patent.

There are no extra-axial collections. Flow-voids for the major intracranial

vessels are present. Numerous subcortical and periventricular white matter T2

hyperintense foci suggest small vessel disease. No intracranial masses are

identified on this unenhanced exam. Orbits and sinuses are unremarkable. There

is no fluid within the mastoid air cells.



IMPRESSION:  



1. No acute intracranial findings.



2. Moderate atrophy and small vessel disease.







Electronically signed by:  Raz Steiner M.D.

7/29/2017 9:25 PM



Dictated Date/Time:  7/29/2017 9:22 PM

## 2017-07-29 NOTE — NEUROLOGY CONSULTATION
Neurology Consultation


Date of Consultation:


Jul 29, 2017.


Attending Physician:


Brice eHndrickson M.D.


Primary Care Physician:


Keon Gross M.D.


Reason for Consultation:


Stroke


History of Present Illness


Source:  patient, hospital records


The patient is an 82 year old female with a chief complaint of left-sided 

weakness that began suddenly, about 2 hours prior to arrival in the emergency 

department. She reports that she had difficulty walking and noticed that her 

left leg was dragging. She reports a feeling of heaviness in the left leg and 

also had some mild weakness of the left hand. She reports a vague feeling of 

lightheadedness and mild headache as well although these particular symptoms 

have resolved. Her symptoms have markedly improved by the time she was 

evaluated in the emergency department although she was observed to have a 

subtle left upper extremity pronator drift at that time. The patient denies 

experiencing any associated disturbance of vision, hearing loss, vertigo, or 

change in speech. She does endorse a long-standing history of palpitations but 

denies chest pain or shortness of breath. He denies any recent falls or 

injuries although her past medical history is notable for degenerative joint 

disease, especially the hips and knees she is status post left total knee 

arthroplasty in the past and indicates that she will need a right hip 

arthroplasty in the near future. This patient's past medical history is also 

notable for aortic aneurysm, hyperlipidemia, hypertension, and sleep apnea. She 

is prescribed medications for her hypertension. She has refused statin therapy. 

She experienced an allergic reaction to aspirin in the past and is currently 

not prescribed any type of blood thinner. Although she has a history of 

palpitations she denies a history of atrial fibrillation.





I reviewed the images and radiologist's interpretation of the CT of the head 

obtained upon presentation. The study is negative for hemorrhage or changes 

suggestive of acute or subacute infarct. There is evidence of chronic small 

vessel ischemic change related to age with patchy periventricular white matter 

ischemic disease. Electrocardiogram revealed a normal sinus rhythm with a heart 

rate of 70 bpm. A CBC was unremarkable. A comprehensive metabolic panel 

revealed mild hyponatremia with a sodium of 130. A lipid panel revealed a 

mildly elevated total cholesterol of 234.





Past Medical/Surgical History


Medical Problems:


(1) Hyponatremia


Status: Acute  





(2) Left-sided weakness


Status: Acute  











Family History


There is no pertinent family history that would affect this patient's 

management in light of her advanced age and nature of her present illness.





Social History


Smoking Status:  Former smoker


Smokeless Tobacco Use:  No


Alcohol Use:  none


Drug Use:  none


Marital Status:  


Housing Status:  lives alone


Occupation Status:  retired





Allergies


Coded Allergies:  


     Aspirin (Verified  Allergy, Severe, TONGUE AND THROAT SWELLS, 10/26/16)


     Cephalexin (Verified  Allergy, Severe, swelling tongue and throat, 10/26/16

)


     Magnesium Salicylate (Verified  Allergy, Severe, ANAPHYLAXIS, 10/26/16)


     Omeprazole (Verified  Allergy, Severe, ANAPHYLAXIS, 10/26/16)


     Tramadol (Verified  Allergy, Severe, ANAPHYLAXIS, 10/26/16)


     Erythromycin (Verified  Allergy, Unknown, SWELLING AND RASH, 10/26/16)


     Oxycodone (Verified  Allergy, Unknown, UNKNOWN, 10/26/16)


     Tetracycline (Verified  Allergy, Unknown, SWELLING AND RASH, 10/26/16)


     Alendronate (Verified  Adverse Reaction, Unknown, very upset stomach, 10/26

/16)


Uncoded Allergies:  


     perfumes (Allergy, Unknown, can't breathe, 10/1/12)





Current Inpatient Medications





Current Inpatient Medications








 Medications


  (Trade)  Dose


 Ordered  Sig/Hanane


 Route  Start Time


 Stop Time Status Last Admin


Dose Admin


 


 Sodium Chloride  1,000 ml @ 


 50 mls/hr  Q20H


 IV  7/28/17 21:14


 8/27/17 21:13  7/28/17 21:34


50 MLS/HR


 


 Miscellaneous


 Information


  (Pharmacist


 Discharge Med Rec


 Consult)  1 ea  UD  PRN


 N/A  7/28/17 23:45


 8/27/17 23:44   


 


 


 Loratadine


  (Claritin Tab)  10 mg  DAILY


 PO  7/29/17 09:00


 8/28/17 08:59  7/29/17 07:58


10 MG


 


 Timolol Maleate


  (Timoptic 0.5%


 Oph Soln)  1 drops  QAM


 OPB  7/29/17 09:00


 8/28/17 08:59  7/29/17 07:58


1 DROPS


 


 Calcium/Vitamin D


  (Caltrate Plus


 Tab)  1 tab  BIDM


 PO  7/29/17 07:30


 8/28/17 07:59  7/29/17 07:57


1 TAB


 


 Cholecalciferol


  (Vitamin D Tab)  2,000


 inter.unit  QAM


 PO  7/29/17 09:00


 8/28/17 08:59  7/29/17 07:58


2,000 INTER.UNIT


 


 Miscellaneous


 Information


  (Order Awaiting


 Action)  1 ea  QS


 N/A  7/29/17 08:00


 8/28/17 07:59   


 


 


 Acetaminophen


  (Tylenol Tab)  650 mg  Q4H  PRN


 PO  7/29/17 01:30


 8/28/17 01:29  7/29/17 06:26


650 MG


 


 Diphenhydramine


 HCl


  (Benadryl Cap)  25 mg  HSZ  PRN


 PO  7/29/17 04:15


 8/28/17 04:14  7/29/17 04:39


25 MG


 


 Lidocaine


  (Lidoderm Patch


 5%)  1 patch  QAM


 TD  7/29/17 09:00


 8/28/17 08:59  7/29/17 07:57


1 PATCH


 


 Miscellaneous


  (Remove Lidoderm


 Patch)  1 ea  DAILY@21


 N/A  7/29/17 21:00


 8/28/17 20:59   


 











Review of Systems


A full 10 point review of systems was obtained from this patient with pertinent 

positives and negatives described in the history of present illness. All other 

systems reviewed and are negative.





Physical Exam


Vital Signs (Past 24 Hrs):











  Date Time  Temp Pulse Resp B/P (MAP) Pulse Ox O2 Delivery O2 Flow Rate FiO2


 


7/29/17 07:50 36.9 58 18 143/86 (105) 96 Room Air  


 


7/29/17 04:00     98 Room Air  


 


7/29/17 03:34 36.6 64 18 117/77 (90) 98 Room Air  


 


7/29/17 01:40 36.7 72 20 185/95 97 Room Air  


 


7/28/17 23:57  70 18 152/92 97 Room Air  


 


7/28/17 22:56  64 20 171/89 98 Room Air  


 


7/28/17 21:21      Room Air  


 


7/28/17 20:55  86      


 


7/28/17 20:48 37.1 65 18 149/71 97 Room Air  








The patient is a well-developed, well-nourished elderly female, no acute 

distress. She is alert and oriented to person place and time. Recent and remote 

memory intact. She is attentive with normal concentration. Patient exhibits a 

normal spontaneous speech pattern as well as an age-appropriate fund of 

knowledge and normal vocabulary. Visual fields full to confrontation. Visual 

acuity normal. Pupils equal round reactive to light and accommodation. Eye 

movements normal. Facial sensation intact. There is no facial droop. Normal 

facial symmetry and strength. Hearing intact. Palate elevates to midline. 

Shoulder shrug intact. Tongue protrudes to midline. Sensation intact to light 

touch, temperature, vibration, and proprioception for all 4 limbs. Deep tendon 

reflexes are 1+ for the upper and lower limbs bilaterally. The left plantar 

responses upgoing. The right plantar response is downgoing. There is slight 

dysmetria with finger to nose and heel to shin on the left. However, lower 

extremity movements are confounded by bilateral hip pain. Ophthalmoscopic 

examination reveals normal-appearing optic disks and posterior segments, no 

papilledema or hemorrhages. Cardiovascular assessment reveals normal carotid 

pulses bilaterally, no bruits. Gait and station not tested due to safety 

concerns. Muscle strength appears to be normal for all 4 limbs although there 

is a subtle left upper extremity pronator drift. Muscle tone normal throughout. 

No atrophy. No abnormal movements appreciated.





Laboratory Results


Past 24 Hours:


7/29/17 05:31








Red Blood Count 4.34, Mean Corpuscular Volume 90.8, Mean Corpuscular Hemoglobin 

31.1, Mean Corpuscular Hemoglobin Concent 34.3, Mean Platelet Volume 9.0, 

Neutrophils (%) (Auto) 53.9, Lymphocytes (%) (Auto) 34.3, Monocytes (%) (Auto) 

9.5, Eosinophils (%) (Auto) 1.7, Basophils (%) (Auto) 0.4, Neutrophils # (Auto) 

2.94, Lymphocytes # (Auto) 1.87, Monocytes # (Auto) 0.52, Eosinophils # (Auto) 

0.09, Basophils # (Auto) 0.02





7/29/17 05:31

















Test


  7/28/17


20:45 7/28/17


21:34 7/28/17


22:00 7/29/17


05:31


 


Prothrombin Time


  10.7 SECONDS


(9.0-12.0) 


  


  


 


 


Prothromb Time International


Ratio 1.0 (0.9-1.1) 


  


  


  


 


 


Activated Partial


Thromboplast Time 32.5 SECONDS


(21.0-31.0) 


  


  


 


 


Partial Thromboplastin Ratio 1.3    


 


Estimated Average Glucose 114 mg/dl    


 


Hemoglobin A1c


  5.6 %


(4.5-5.6) 


  


  


 


 


Total Bilirubin


  0.6 mg/dl


(0.2-1) 


  


  


 


 


Direct Bilirubin


  0.1 mg/dl


(0-0.2) 


  


  


 


 


Aspartate Amino Transf


(AST/SGOT) 13 U/L (15-37) 


  


  


  


 


 


Alanine Aminotransferase


(ALT/SGPT) 16 U/L (12-78) 


  


  


  


 


 


Alkaline Phosphatase


  94 U/L


() 


  


  


 


 


Total Protein


  7.5 gm/dl


(6.4-8.2) 


  


  


 


 


Albumin


  3.7 gm/dl


(3.4-5.0) 


  


  


 


 


Bedside Prothrombin Time INR  1.0 (0.9-1.1)   


 


Urine Color   YELLOW  


 


Urine Appearance   CLEAR (CLEAR)  


 


Urine pH   8.5 (4.5-7.5)  


 


Urine Specific Gravity


  


  


  1.010


(1.000-1.030) 


 


 


Urine Protein   NEG (NEG)  


 


Urine Glucose (UA)   NEG (NEG)  


 


Urine Ketones   NEG (NEG)  


 


Urine Occult Blood   NEG (NEG)  


 


Urine Nitrite   NEG (NEG)  


 


Urine Bilirubin   NEG (NEG)  


 


Urine Urobilinogen   NEG (NEG)  


 


Urine Leukocyte Esterase   SMALL (NEG)  


 


Urine WBC (Auto)   1-5 /hpf (0-5)  


 


Urine RBC (Auto)   0-4 /hpf (0-4)  


 


Urine Hyaline Casts (Auto)   0 /lpf (0-5)  


 


Urine Epithelial Cells (Auto)


  


  


  10-20 /lpf


(0-5) 


 


 


Urine Bacteria (Auto)   NEG (NEG)  


 


White Blood Count


  


  


  


  5.45 K/uL


(4.8-10.8)


 


Red Blood Count


  


  


  


  4.34 M/uL


(4.2-5.4)


 


Hemoglobin


  


  


  


  13.5 g/dL


(12.0-16.0)


 


Hematocrit    39.4 % (37-47) 


 


Mean Corpuscular Volume


  


  


  


  90.8 fL


()


 


Mean Corpuscular Hemoglobin


  


  


  


  31.1 pg


(25-34)


 


Mean Corpuscular Hemoglobin


Concent 


  


  


  34.3 g/dl


(32-36)


 


Platelet Count


  


  


  


  232 K/uL


(130-400)


 


Mean Platelet Volume


  


  


  


  9.0 fL


(7.4-10.4)


 


Neutrophils (%) (Auto)    53.9 % 


 


Lymphocytes (%) (Auto)    34.3 % 


 


Monocytes (%) (Auto)    9.5 % 


 


Eosinophils (%) (Auto)    1.7 % 


 


Basophils (%) (Auto)    0.4 % 


 


Neutrophils # (Auto)


  


  


  


  2.94 K/uL


(1.4-6.5)


 


Lymphocytes # (Auto)


  


  


  


  1.87 K/uL


(1.2-3.4)


 


Monocytes # (Auto)


  


  


  


  0.52 K/uL


(0.11-0.59)


 


Eosinophils # (Auto)


  


  


  


  0.09 K/uL


(0-0.5)


 


Basophils # (Auto)


  


  


  


  0.02 K/uL


(0-0.2)


 


RDW Standard Deviation


  


  


  


  42.2 fL


(36.4-46.3)


 


RDW Coefficient of Variation


  


  


  


  12.5 %


(11.5-14.5)


 


Immature Granulocyte % (Auto)    0.2 % 


 


Immature Granulocyte # (Auto)


  


  


  


  0.01 K/uL


(0.00-0.02)


 


Anion Gap


  


  


  


  3.0 mmol/L


(3-11)


 


Est Creatinine Clear Calc


Drug Dose 


  


  


  64.6 ml/min 


 


 


Estimated GFR (


American) 


  


  


  91.0 


 


 


Estimated GFR (Non-


American 


  


  


  78.5 


 


 


BUN/Creatinine Ratio    11.0 (10-20) 


 


Calcium Level


  


  


  


  9.3 mg/dl


(8.5-10.1)


 


Total Creatine Kinase


  


  


  


  51 U/L


()


 


Creatine Kinase MB


  


  


  


  1.3 ng/ml


(0.5-3.6)


 


Creatine Kinase MB Ratio    2.5 (0-3.0) 


 


Troponin I


  


  


  


  < 0.015 ng/ml


(0-0.045)


 


Triglycerides Level


  


  


  


  149 mg/dl


(0-150)


 


Cholesterol Level


  


  


  


  234 mg/dl


(0-200)


 


HDL Cholesterol    52 mg/dl 


 


LDL Cholesterol, Calculated    152 mg/dl 


 


VLDL Cholesterol, Calculated    30 mg/dl 


 


Cholesterol/HDL Ratio    4.5 











Imaging


I personally reviewed the recently completed CT head images as described in the 

history of present illness.





Impression


I suspect this patient has experienced a small, right hemispheric stroke 

potentially localizing to either the right anterior cerebral artery territory 

or possibly the right MCA. Her neurological examination this morning reveals 

only minimal left-sided weakness with an associated left Babinski response.





This patient's CT of the head does reveal chronic small vessel ischemic change 

which could also explain her current examination findings. Therefore, a TIA 

localizing to the right cerebral hemisphere is also possible.





A specific stroke or TIA etiology has not been determined although 

considerations would include stenosis of the right internal carotid artery, 

small vessel thromboischemic disease, or atrial fibrillation.





Plan


Agree with obtaining MRI of the brain as well as MRA of the head and neck as 

ordered.





I also agree with obtaining a carotid ultrasound to potentially corroborate any 

angiography findings.





Transthoracic echocardiogram as ordered.





As this patient has an allergy to aspirin I would recommend that she start 

taking Plavix 75 mg per day.





Continue to monitor patient's blood pressure. A systolic blood pressure of 140-

160 mmHg is appropriate at this time.





If the above imaging reveals evidence of significant stenosis of either 

internal carotid artery then a consultation with vascular surgery should be 

obtained.





If this patient is found to have atrial fibrillation then anticoagulation 

should be recommended with the choice of medication to be made at the 

discretion of the attending hospitalist physician.





Consultation with rehabilitation services including physical therapy, 

occupational therapy, and speech therapy as ordered.





Please contact me if I may be of further assistance.

## 2017-07-29 NOTE — ECHOCARDIOGRAM REPORT
*NOTICE TO RECEIVING PARTY AGENCY**  This information is strictly Confidential and protected under 
[place] law.  [place] law prohibits you from making any further disclosure of this 
information unless further disclosure is expressly permitted by the written consent of the person to 
whom it pertains or is authorized by law.  A general authorization for the release of medical or 
other information is not sufficient for this purpose.  Hospital accepts no responsibility if the 
information is made available to any other person, INCLUDING THE PATIENT.



Interpretation Summary

  *  Name: JOSE ANTONIO ÁLVAREZ  Study Date: 2017 01:40 PM  BP: 117/77 mmHg

  *  MRN: [id number]  Patient Location: 216  HR: 61

  *  : 1935 (M/d/yyyy)  Gender: Female  Height: 66 in

  *  Age: 82 yrs  Ethnicity: [place]  Weight: 180 lb

  *  Ordering Physician: Brice Hendrickson

  *  Referring Physician: Self, Referred

  *  Performed By: Huseyin Renteria RDCS

  *  Accession# [id number]  Account# [id number]

  *  Reason For Study: Cerebral ischemia/Embolus?

  *  BSA: 1.9 m2

  *  -- Conclusions --

  *  Left ventricular systolic function is normal.

  *  Grade I diastolic dysfunction, (abnormal relaxation pattern).

  *  There is a linear lesion on the aortic valve whcih is most consistent with a Lambl's 
excrescence and is unchanged from images obtained in 

  *  Mild valvular aortic stenosis.

Procedure Details

  *  A complete two-dimensional transthoracic echocardiogram was performed (2D, M-mode, Doppler and 
color flow Doppler).

  *  The study was technically adequate.

Left Ventricle

  *  The left ventricle is normal in size.

  *  There is no thrombus.

  *  There is normal left ventricular wall thickness.

  *  Left ventricular systolic function is normal.

  *  Grade I diastolic dysfunction, (abnormal relaxation pattern).

Right Ventricle

  *  The right ventricle is normal in size and function.

Atria

  *  The left atrial size is normal.

  *  Right atrial size is normal.

Mitral Valve

  *  The mitral valve anatomy is normal.

  *  There is no mitral regurgitation noted.

Tricuspid Valve

  *  The tricuspid valve is not well visualized, but is grossly normal.

  *  Significant tricuspid regurgitation is absent.

Aortic Valve

  *  There is a linear lesion on the aortic valve whcih is most consistent with a Lambl's 
excrescence and is unchanged from images obtained in 

  *  Mild valvular aortic stenosis.

  *  No aortic regurgitation is present.

Great Vessels

  *  The aortic root is normal size.

Pericardium/Pleural

  *  There is no pericardial effusion.



MMode 2D Measurements and Calculations

IVSd 0.85 cm

IVSs 1.4 cm



LVIDd 4.6 cm

LVIDs 2.9 cm

LVPWd 0.83 cm

LVPWs 1.3 cm



IVS/LVPW 1.0 

FS 36.6 %

EDV(Teich) 97.6 ml

ESV(Teich) 32.8 ml

EF(Teich) 66.4 %



EDV(cubed) 97.7 ml

ESV(cubed) 24.9 ml

EF(cubed) 74.5 %

% IVS thick 60.9 %

% LVPW thick 56.8 %





LV mass(C)d 126.3 grams

LV mass(C)dI 66.0 grams/m\S\2

LV mass(C)s 125.6 grams

LV mass(C)sI 65.7 grams/m\S\2



SV(Teich) 64.9 ml

SI(Teich) 33.9 ml/m\S\2

SV(cubed) 72.8 ml

SI(cubed) 38.1 ml/m\S\2



Ao root diam 2.9 cm

Ao root area 6.8 cm\S\2

ACS 1.8 cm

LA dimension 3.5 cm



asc Aorta Diam 3.5 cm





LA/Ao 1.2 

LVOT diam 2.0 cm

LVOT area 3.1 cm\S\2



LVAd ap4 21.8 cm\S\2

LVLd ap4 6.7 cm

EDV(MOD-sp4) 56.9 ml

LVAs ap4 10.6 cm\S\2

LVLs ap4 5.4 cm

ESV(MOD-sp4) 18.4 ml

EF(MOD-sp4) 67.7 %



LVAd ap2 19.2 cm\S\2

LVLd ap2 6.7 cm

EDV(MOD-sp2) 45.6 ml

LVAs ap2 9.2 cm\S\2

LVLs ap2 5.2 cm

ESV(MOD-sp2) 14.8 ml

EF(MOD-sp2) 67.5 %



SV(MOD-sp4) 38.5 ml

SI(MOD-sp4) 20.1 ml/m\S\2





SV(MOD-sp2) 30.8 ml

SI(MOD-sp2) 16.1 ml/m\S\2













Doppler Measurements and Calculations

MV E max ashlee 67.9 cm/sec

MV A max ashlee 96.7 cm/sec



MV E/A 0.70 



MV dec time 0.41 sec



Ao V2 max 221.0 cm/sec

Ao max PG 19.5 mmHg

Ao max PG (full) 13.7 mmHg

Ao V2 mean 141.3 cm/sec

Ao mean PG 9.3 mmHg

Ao mean PG (full) 6.6 mmHg

Ao V2 VTI 44.6 cm

CATRACHITO(I,A) 1.8 cm\S\2

CATRACHITO(I,D) 1.8 cm\S\2

CATRACHITO(V,A) 1.7 cm\S\2

CATRACHITO(V,D) 1.7 cm\S\2





LV V1 max PG 5.8 mmHg

LV V1 mean PG 2.7 mmHg



LV V1 max 120.4 cm/sec

LV V1 mean 75.7 cm/sec

LV V1 VTI 25.6 cm



SV(Ao) 302.5 ml

SI(Ao) 158.1 ml/m\S\2

SV(LVOT) 78.6 ml

SI(LVOT) 41.1 ml/m\S\2



PA V2 max 118.1 cm/sec

PA max PG 5.6 mmHg

## 2017-07-29 NOTE — DIAGNOSTIC IMAGING REPORT
CAROTID ARTERY ULTRASOUND



CLINICAL HISTORY: Transient ischemic attack.    



COMPARISON STUDY: None.



TECHNIQUE: Real-time, grayscale, and color Doppler sonography of the carotid and

vertebral arteries was performed. Images were viewed in the transverse and

longitudinal planes.



FINDINGS:



There is moderate atherosclerotic plaque. Velocity measurements are listed

below.



COMMON CAROTID PEAK SYSTOLIC VELOCITY (CM/S):  RIGHT 68  LEFT 61



ICA PEAK SYSTOLIC VELOCITY (CM/S):  RIGHT 94  LEFT 62





The systolic ratios between the internal to common carotid arteries were normal.





Antegrade flow is seen in the vertebral arteries. The external carotid arteries

are patent.







IMPRESSION:  Moderate atherosclerotic plaque without evidence of a

hemodynamically significant stenosis.







Electronically signed by:  Raz Steiner M.D.

7/29/2017 10:42 AM



Dictated Date/Time:  7/29/2017 10:40 AM

## 2017-07-30 VITALS
HEART RATE: 58 BPM | TEMPERATURE: 97.34 F | DIASTOLIC BLOOD PRESSURE: 86 MMHG | OXYGEN SATURATION: 95 % | SYSTOLIC BLOOD PRESSURE: 127 MMHG

## 2017-07-30 VITALS
TEMPERATURE: 97.88 F | DIASTOLIC BLOOD PRESSURE: 80 MMHG | SYSTOLIC BLOOD PRESSURE: 150 MMHG | OXYGEN SATURATION: 97 % | HEART RATE: 67 BPM

## 2017-07-30 VITALS
OXYGEN SATURATION: 97 % | SYSTOLIC BLOOD PRESSURE: 155 MMHG | HEART RATE: 52 BPM | DIASTOLIC BLOOD PRESSURE: 83 MMHG | TEMPERATURE: 97.52 F

## 2017-07-30 LAB
ANION GAP SERPL CALC-SCNC: 3 MMOL/L (ref 3–11)
BASOPHILS # BLD: 0.03 K/UL (ref 0–0.2)
BASOPHILS NFR BLD: 0.5 %
BUN SERPL-MCNC: 16 MG/DL (ref 7–18)
BUN/CREAT SERPL: 20.3 (ref 10–20)
CALCIUM SERPL-MCNC: 9 MG/DL (ref 8.5–10.1)
CHLORIDE SERPL-SCNC: 101 MMOL/L (ref 98–107)
CO2 SERPL-SCNC: 31 MMOL/L (ref 21–32)
COMPLETE: YES
CREAT CL PREDICTED SERPL C-G-VRATE: 60.1 ML/MIN
CREAT SERPL-MCNC: 0.77 MG/DL (ref 0.6–1.2)
EOSINOPHIL NFR BLD AUTO: 228 K/UL (ref 130–400)
GLUCOSE SERPL-MCNC: 108 MG/DL (ref 70–99)
HCT VFR BLD CALC: 37.8 % (ref 37–47)
IG%: 0.2 %
IMM GRANULOCYTES NFR BLD AUTO: 32.6 %
LYMPHOCYTES # BLD: 2.01 K/UL (ref 1.2–3.4)
MCH RBC QN AUTO: 31 PG (ref 25–34)
MCHC RBC AUTO-ENTMCNC: 33.9 G/DL (ref 32–36)
MCV RBC AUTO: 91.5 FL (ref 80–100)
MONOCYTES NFR BLD: 10.2 %
NEUTROPHILS # BLD AUTO: 1.6 %
NEUTROPHILS NFR BLD AUTO: 54.9 %
PMV BLD AUTO: 9 FL (ref 7.4–10.4)
POTASSIUM SERPL-SCNC: 3.8 MMOL/L (ref 3.5–5.1)
RBC # BLD AUTO: 4.13 M/UL (ref 4.2–5.4)
SODIUM SERPL-SCNC: 135 MMOL/L (ref 136–145)
WBC # BLD AUTO: 6.17 K/UL (ref 4.8–10.8)

## 2017-07-30 RX ADMIN — Medication SCH MG: at 09:05

## 2017-07-30 RX ADMIN — ACETAMINOPHEN PRN MG: 325 TABLET ORAL at 05:05

## 2017-07-30 RX ADMIN — TIMOLOL MALEATE SCH DROPS: 6.8 SOLUTION/ DROPS OPHTHALMIC at 09:06

## 2017-07-30 RX ADMIN — Medication SCH INTER.UNIT: at 09:05

## 2017-07-30 RX ADMIN — ACETAMINOPHEN PRN MG: 325 TABLET ORAL at 09:20

## 2017-07-30 RX ADMIN — Medication SCH TAB: at 09:05

## 2017-07-30 RX ADMIN — ALUMINUM ZIRCONIUM TRICHLOROHYDREX GLY SCH EA: 0.2 STICK TOPICAL at 00:00

## 2017-07-30 RX ADMIN — ACETAMINOPHEN PRN MG: 325 TABLET ORAL at 00:15

## 2017-07-30 RX ADMIN — HEPARIN SODIUM SCH UNIT: 10000 INJECTION, SOLUTION INTRAVENOUS; SUBCUTANEOUS at 09:18

## 2017-07-30 NOTE — DISCHARGE INSTRUCTIONS
Discharge Instructions


Date of Service


Jul 30, 2017.





Admission


Reason for Admission:  Hyponatremia, Left Sided Weakness





Discharge


Discharge Diagnosis / Problem:  weakness most likely from hip, can't 

definitively rule out TIA (see below)





Discharge Goals


Goal(s):  Diagnostic testing





Activity Recommendations


Activity Limitations:  resume your previous activity





.





Instructions / Follow-Up


Instructions / Follow-Up


as we discussed, since the weakness was predominantly leg (and was leg first) 

and then the upper body symptoms were far less specific (and not really just 

left sided) - it's more likely that this was hip mediated problems followed by 

anxiety (as you suspected) - but it's nearly impossible to truly rule out a TIA 

given the overall situation.  your risk factors (age + hypertension + 

cholesterol) does make you risky for a vascular endpoint (heart attack or stroke

) regardless - so talking primary prevention (the mindset that this wasn't a 

TIA but you're at risk) would favor starting a statin and plavix, talking 

secondary prevention (the less likely event that this was a TIA) would pretty 

much mandate a statin and plavix.  discuss further with Dr Gross since there'

s not an emergent nature to your particular situation, but also keep in mind 

that typically med related side effects for statins and antiplatelets almost 

always reverse with cessation of the meds, and vascular endpoints (heart attacks

/strokes) almost always leave you with dead/damaged tissue that we can't fix.





-talk more with Dr Gross about this in follow up











Risk Factors for Stroke:





You can reduce your chances of stroke by working with your medical provider to 

adopt a healthy lifestyle.  Some specific ways to lower your chance of stroke 

are:





* If you are a smoker, now is the time to stop smoking cigarettes


* If you are diabetic, improve the control of your blood sugars


* Avoid excessive amounts of alcohol


* Control high blood pressure


* Lose weight if you are overweight


* Be sure to lead an active lifestyle


* Eat a healthy diet low in salt, cholesterol and fat





You should know about other risk factors for stroke that you are unable to 

control.  These include:





* Age 55 years or older


* Male gender


* Certain racial groups: ,  or /


* Family History of Stroke, Mini stroke or Heart Attack


* Sickle Cell Disease





Follow Up:





It is important for you to keep your follow up appointments with your medical 

provider.





Current Hospital Diet


Patient's current hospital diet: AHA Diet (Heart Healthy)





Discharge Diet


Recommended Diet:  AHA Diet (Heart Healthy)





Pending Studies


Studies pending at discharge:  no





Laboratory Results





Hemoglobin A1c








Test


  7/28/17


20:45 Range/Units


 


 


Estimated Average Glucose 114   mg/dl


 


Hemoglobin A1c 5.6  4.5-5.6  %








Lipid Panel








Test


  7/29/17


05:31 Range/Units


 


 


Triglycerides Level 149  0-150  mg/dl


 


Cholesterol Level 234 H 0-200  mg/dl


 


HDL Cholesterol 52   mg/dl


 


Cholesterol/HDL Ratio 4.5   


 


LDL Cholesterol, Calculated 152   mg/dl











Medical Emergencies








.


Who to Call and When:





Medical Emergencies:  Call 911 immediately if you experience any of the 

following warning signs and symptoms of Stroke:





* Sudden numbness or weakness of the face, arm or leg, especially on one side 

of the body


* Sudden confusion, trouble speaking or understanding


* Sudden trouble seeing in one or both eyes


* Sudden trouble walking, dizziness, loss of balance or coordination


* Sudden severe headache with no cause





Do not delay calling 911 if you experience any warning signs or symptoms of a 

stroke.





Delay in seeking medical attention may affect what treatments can be given to 

you.








.





Non-Emergent Contact


Non-Emergency issues call your:  Primary Care Provider





.


.








"Provider Documentation" section prepared by [employer].








.





Stroke Core Measures


Reason no t-PA for Stroke:  Treatment provided - N/A


Reason no antithrom by day 2:  Treatment not indicated (see above, see 

discharge summary - debatable situation)


Reason no antithrom at D/C:  Treatment not indicated (see above)


Reason no statin at D/C:  Treatment not indicated (see above)


Reason no anticoag w/a fib:  Treatment not indicated (see above)





VTE Core Measure


Inpt VTE Proph given/why not?:  SCD's

## 2017-07-30 NOTE — DISCHARGE SUMMARY
Discharge Summary


Date of Service


2017.





Discharge Summary


Admission Date:


2017 at 23:49


Discharge Date:  2017


Principal Diagnosis:  hip/leg heaviness, possible TIA


Immunizations:  


   Have You Had Influenza Vaccine:  Yes


   History of Tetanus Vaccine?:  Unknown


   History of Pneumococcal:  Yes


   History of Hepatitis B Vaccine:  Unknown


Procedures:


echo:


Interpretation Summary


Name: JOSE ANTONIO ÁLVAREZ  Study Date: 2017 01:40 PM  BP: 117/77 mmHg


MRN: [id number]  Patient Location: 216  HR: 61


: 1935 (M/d/yyyy)  Gender: Female  Height: 66 in


Age: 82 yrs  Ethnicity: [place]  Weight: 180 lb


Ordering Physician: Brice Hendrickson


Referring Physician: Self, Referred


Performed By: Huseyin Renteria RDCS


Accession# [id number]  Account# [id number]


Reason For Study: Cerebral ischemia/Embolus?


BSA: 1.9 m2


-- Conclusions --


Left ventricular systolic function is normal.


Grade I diastolic dysfunction, (abnormal relaxation pattern).


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 


Mild valvular aortic stenosis.


Procedure Details


A complete two-dimensional transthoracic echocardiogram was performed (2D, M-

mode, Doppler and color flow Doppler).


The study was technically adequate.


Left Ventricle


The left ventricle is normal in size.


There is no thrombus.


There is normal left ventricular wall thickness.


Left ventricular systolic function is normal.


Grade I diastolic dysfunction, (abnormal relaxation pattern).


Right Ventricle


The right ventricle is normal in size and function.


Atria


The left atrial size is normal.


Right atrial size is normal.


Mitral Valve


The mitral valve anatomy is normal.


There is no mitral regurgitation noted.


Tricuspid Valve


The tricuspid valve is not well visualized, but is grossly normal.


Significant tricuspid regurgitation is absent.


Aortic Valve


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 


Mild valvular aortic stenosis.


No aortic regurgitation is present.


Great Vessels


The aortic root is normal size.


Pericardium/Pleural


There is no pericardial effusion.


-----------------------------





MRI BRAIN:


[~ rep ct add3]]


MRI OF THE BRAIN WITHOUT CONTRAST





CLINICAL HISTORY: Stroke.    





COMPARISON STUDY: Head CT 2017.





TECHNIQUE: Utilizing a 1.5 Goldie magnet and dedicated coil, multiplanar,


multiecho imaging of the brain was performed without IV contrast. 





FINDINGS: There are no areas of restricted diffusion. No acute intracranial


hemorrhage, midline shift or mass effect is present. There is mild atrophy.


Ventricular system is unremarkable for age. The basilar cisterns are patent.


There are no extra-axial collections. Flow-voids for the major intracranial


vessels are present. Numerous subcortical and periventricular white matter T2


hyperintense foci suggest small vessel disease. No intracranial masses are


identified on this unenhanced exam. Orbits and sinuses are unremarkable. There


is no fluid within the mastoid air cells.





IMPRESSION:  





1. No acute intracranial findings.





2. Moderate atrophy and small vessel disease.











Electronically signed by:  Raz Steiner M.D.


2017 9:25 PM


----------------------------------------------------------





CAROTID US:


CAROTID ARTERY ULTRASOUND





CLINICAL HISTORY: Transient ischemic attack.    





COMPARISON STUDY: None.





TECHNIQUE: Real-time, grayscale, and color Doppler sonography of the carotid and


vertebral arteries was performed. Images were viewed in the transverse and


longitudinal planes.





FINDINGS:





There is moderate atherosclerotic plaque. Velocity measurements are listed


below.





COMMON CAROTID PEAK SYSTOLIC VELOCITY (CM/S):  RIGHT 68  LEFT 61





ICA PEAK SYSTOLIC VELOCITY (CM/S):  RIGHT 94  LEFT 62








The systolic ratios between the internal to common carotid arteries were normal.








Antegrade flow is seen in the vertebral arteries. The external carotid arteries


are patent.











IMPRESSION:  Moderate atherosclerotic plaque without evidence of a


hemodynamically significant stenosis.











Electronically signed by:  Raz Steiner M.D.


2017 10:42 AM





Dictated Date/Time:  2017 10:40 AM


------------------------------------------





CT HEAD:


[~ rep ct add3]]


HEAD WITHOUT CONTRAST (CT)





CT DOSE: 638.56 mGycm





HISTORY: Mental status change  Stroke





TECHNIQUE: Multiaxial CT images of the head were performed without the use of


intravenous contrast.  A dose lowering technique was utilized adhering to the


principles of ALARA.








Comparison: None.





Findings: The paranasal sinuses and mastoid air cells are clear. Mild chronic


small vessel change of aging. Ventricular system is midline. No evidence for


acute intracranial hemorrhage.





Impression:


Age-related change. No acute process. 

















The above report was generated using voice recognition software.  It may contain


grammatical, syntax or spelling errors.











Electronically signed by:  Evgeny Cardozo M.D.


2017 9:54 PM





Dictated Date/Time:  2017 9:52 PM


-----------------------------------





CXR:


CHEST ONE VIEW PORTABLE





CLINICAL HISTORY: Stroke mental status change





COMPARISON STUDY:  2012





FINDINGS: The bones soft tissues and hemidiaphragms are normal. The


cardiomediastinal silhouette is normal. The lungs are clear. The pulmonary


vasculature is normal. 





IMPRESSION:  Negative chest. 

















The above report was generated using voice recognition software.  It may contain


grammatical, syntax or spelling errors.














Electronically signed by:  Evgeny Cardozo M.D.


2017 9:30 PM





Dictated Date/Time:  2017 9:30 PM


----------------------------








Last 24 Hours








Test


  17


05:47


 


White Blood Count 6.17 K/uL 


 


Red Blood Count 4.13 M/uL 


 


Hemoglobin 12.8 g/dL 


 


Hematocrit 37.8 % 


 


Mean Corpuscular Volume 91.5 fL 


 


Mean Corpuscular Hemoglobin 31.0 pg 


 


Mean Corpuscular Hemoglobin


Concent 33.9 g/dl 


 


 


Platelet Count 228 K/uL 


 


Mean Platelet Volume 9.0 fL 


 


Neutrophils (%) (Auto) 54.9 % 


 


Lymphocytes (%) (Auto) 32.6 % 


 


Monocytes (%) (Auto) 10.2 % 


 


Eosinophils (%) (Auto) 1.6 % 


 


Basophils (%) (Auto) 0.5 % 


 


Neutrophils # (Auto) 3.39 K/uL 


 


Lymphocytes # (Auto) 2.01 K/uL 


 


Monocytes # (Auto) 0.63 K/uL 


 


Eosinophils # (Auto) 0.10 K/uL 


 


Basophils # (Auto) 0.03 K/uL 


 


RDW Standard Deviation 42.7 fL 


 


RDW Coefficient of Variation 12.8 % 


 


Immature Granulocyte % (Auto) 0.2 % 


 


Immature Granulocyte # (Auto) 0.01 K/uL 


 


Sodium Level 135 mmol/L 


 


Potassium Level 3.8 mmol/L 


 


Chloride Level 101 mmol/L 


 


Carbon Dioxide Level 31 mmol/L 


 


Anion Gap 3.0 mmol/L 


 


Blood Urea Nitrogen 16 mg/dl 


 


Creatinine 0.77 mg/dl 


 


Est Creatinine Clear Calc


Drug Dose 60.1 ml/min 


 


 


Estimated GFR (


American) 83.9 


 


 


Estimated GFR (Non-


American 72.4 


 


 


BUN/Creatinine Ratio 20.3 


 


Random Glucose 108 mg/dl 


 


Calcium Level 9.0 mg/dl 








Hemoglobin A1c








Test


  17


20:45 Range/Units


 


 


Estimated Average Glucose 114   mg/dl


 


Hemoglobin A1c 5.6  4.5-5.6  %








Lipid Panel








Test


  17


05:31 Range/Units


 


 


Triglycerides Level 149  0-150  mg/dl


 


Cholesterol Level 234 H 0-200  mg/dl


 


HDL Cholesterol 52   mg/dl


 


Cholesterol/HDL Ratio 4.5   


 


LDL Cholesterol, Calculated 152   mg/dl








Consultations:


neurology





Medication Reconciliation


Continued Medications:  


Acetaminophen Tab (Tylenol) 325 Mg Tab


650 MG PO TID PRN for Pain, TAB





Azelastine Hcl (Astelin Nasal Spray) 200 Sprays/30 Ml [place]


2 SPRAYS DIEGO BID, BTL





Calcium (Calcium) Unknown Strength Tab


1 TAB PO DAILY





Cholecalciferol (Vitamin D3) 2,000 Unit Tab


2000 INTER.UNIT PO DAILY, TAB





Epinephrine (Epipen) 0.3 Mg/0.3 Ml Inj


0.3 MG IM UD PRN for ALLERGIC REACTION, BOX





Glucosamine-Chondroitin-Vit C- (Glucosamine Chondroitin) 1 Tab Tab


1 TAB PO DAILY





Hydrochlorothiazide (Hctz) 12.5 Mg Cap


12.5 MG PO DAILY, TAB





Loratadine (Claritin) 10 Mg Cap


10 MG PO DAILY, CAP





Timolol Maleate (Timolol Maleate) 0.5 % Sol


1 DROP OPB QAM











Discharge Exam


Physical Exam:  


   General Appearance:  no apparent distress


   Eyes:  EOMI


   ENT:  hearing grossly normal


   Neck:  trachea midline


   Respiratory/Chest:  no respiratory distress, no accessory muscle use


   Extremities:  normal inspection


   Neurologic/Psychiatric:  CNs II-XII nml as tested, no motor/sensory deficits

, alert, normal mood/affect


   Skin:  normal color, warm/dry





Hospital Course


unilateral weakness


-TIA vs hip pain and anxiety


-MRI negative for stroke, clinical progression of leg heaviness then what she 

recalls as b/l nonspecific UE sx and b/l lip tingling makes hip pain/anxiety 

more likely; however, hx as gleaned by other providers earlier in the course of 

her stay was more concerning for possible TIA


   -d/w pt can't definitively rule out TIA, but with no afib, no carotid disease

, main management would be BP control, lipid control, antiplatelet


   -discussed that with her age it can be debatable w lipid management but with 

her high likelihood of ongoing longevity, statin for primary prevention would 

be indicated, further


      statin for secondary prevention if this actually was a TIA would be quite 

beneficial


   -similarly discussed that if primary prevention, her risks (age, HTN, 

hyperlipidemia) would give plausible benefit to antiplatelet, and that if this 

was a TIA, definite benefit from


      secondary prevention


   -she expressed understanding and appreciation of this, was likely to start 

meds, but wanted to discuss w PCP further first -- seeing in very near future, 

so safe to proceed as


   she desires.  safe/stable for home





hyperlipidemia


-as above





HTN


-continue home meds


-outpatient follow up to titrate to goal//outpatient monitoring





hip pain / DJD 


-for NILDA soon





DVT proph


-heparin SQ utilized while here


Total Time Spent:  Greater than 30 minutes


This includes examination of the patient, discharge planning, medication 

reconciliation, and communication with other providers.





Discharge Instructions


Please refer to the electronic Patient Visit Report (Discharge Instructions) 

for additional information.





Additional Copies To


Keon Gross M.D.

## 2017-08-11 ENCOUNTER — HOSPITAL ENCOUNTER (EMERGENCY)
Dept: HOSPITAL 45 - C.EDB | Age: 82
LOS: 1 days | Discharge: HOME | End: 2017-08-12
Payer: COMMERCIAL

## 2017-08-11 VITALS
BODY MASS INDEX: 29.66 KG/M2 | HEIGHT: 65.98 IN | BODY MASS INDEX: 29.66 KG/M2 | WEIGHT: 184.53 LBS | HEIGHT: 65.98 IN | WEIGHT: 184.53 LBS

## 2017-08-11 VITALS — TEMPERATURE: 98.24 F | OXYGEN SATURATION: 94 %

## 2017-08-11 DIAGNOSIS — Z79.899: ICD-10-CM

## 2017-08-11 DIAGNOSIS — R53.1: Primary | ICD-10-CM

## 2017-08-11 DIAGNOSIS — M25.552: ICD-10-CM

## 2017-08-11 DIAGNOSIS — Z96.659: ICD-10-CM

## 2017-08-11 DIAGNOSIS — G47.30: ICD-10-CM

## 2017-08-11 DIAGNOSIS — Z85.3: ICD-10-CM

## 2017-08-11 DIAGNOSIS — M25.551: ICD-10-CM

## 2017-08-11 DIAGNOSIS — I10: ICD-10-CM

## 2017-08-11 LAB
ALP SERPL-CCNC: 92 U/L (ref 45–117)
ALT SERPL-CCNC: 18 U/L (ref 12–78)
ANION GAP SERPL CALC-SCNC: 12 MMOL/L (ref 3–11)
APPEARANCE UR: CLEAR
AST SERPL-CCNC: 16 U/L (ref 15–37)
BASOPHILS # BLD: 0.02 K/UL (ref 0–0.2)
BASOPHILS NFR BLD: 0.3 %
BILIRUB UR-MCNC: (no result) MG/DL
BUN SERPL-MCNC: 16 MG/DL (ref 7–18)
BUN/CREAT SERPL: 18.5 (ref 10–20)
CALCIUM SERPL-MCNC: 9.1 MG/DL (ref 8.5–10.1)
CHLORIDE SERPL-SCNC: 106 MMOL/L (ref 98–107)
CO2 SERPL-SCNC: 29 MMOL/L (ref 21–32)
COLOR UR: YELLOW
COMPLETE: YES
CREAT CL PREDICTED SERPL C-G-VRATE: 54.6 ML/MIN
CREAT SERPL-MCNC: 0.88 MG/DL (ref 0.6–1.2)
EOSINOPHIL NFR BLD AUTO: 246 K/UL (ref 130–400)
GLUCOSE SERPL-MCNC: 99 MG/DL (ref 70–99)
HCT VFR BLD CALC: 38.3 % (ref 37–47)
IG%: 0.2 %
IMM GRANULOCYTES NFR BLD AUTO: 29.7 %
LYMPHOCYTES # BLD: 1.94 K/UL (ref 1.2–3.4)
MANUAL MICROSCOPIC REQUIRED?: NO
MCH RBC QN AUTO: 31.7 PG (ref 25–34)
MCHC RBC AUTO-ENTMCNC: 34.5 G/DL (ref 32–36)
MCV RBC AUTO: 92.1 FL (ref 80–100)
MONOCYTES NFR BLD: 6.9 %
NEUTROPHILS # BLD AUTO: 2 %
NEUTROPHILS NFR BLD AUTO: 60.9 %
NITRITE UR QL STRIP: (no result)
PH UR STRIP: 7.5 [PH] (ref 4.5–7.5)
PMV BLD AUTO: 9.5 FL (ref 7.4–10.4)
POTASSIUM SERPL-SCNC: 4.5 MMOL/L (ref 3.5–5.1)
RBC # BLD AUTO: 4.16 M/UL (ref 4.2–5.4)
REVIEW REQ?: NO
SODIUM SERPL-SCNC: 147 MMOL/L (ref 136–145)
SP GR UR STRIP: 1.01 (ref 1–1.03)
URINE BILL WITH OR WITHOUT MIC: (no result)
URINE EPITHELIAL CELL AUTO: (no result) /LPF (ref 0–5)
UROBILINOGEN UR-MCNC: (no result) MG/DL
WBC # BLD AUTO: 6.53 K/UL (ref 4.8–10.8)

## 2017-08-12 VITALS — OXYGEN SATURATION: 97 % | SYSTOLIC BLOOD PRESSURE: 137 MMHG | DIASTOLIC BLOOD PRESSURE: 65 MMHG | HEART RATE: 69 BPM

## 2017-08-12 NOTE — EMERGENCY ROOM VISIT NOTE
History


Report prepared by Onofre:  Valeria Mcleod


Under the Supervision of:  Dr. Robb Dumont M.D.


First contact with patient:  20:25


Chief Complaint:  WEAKNESS


Stated Complaint:  WEAKNESS


Nursing Triage Summary:  


Patient presents to [facility] via BLS from her home. Patient states "I am due to 

have 


a bilateral hip replacement soon. I use a cane and/or walker for ambulation at 


home. I am in a lot of pain with my hips and I also have a bad right shoulder. 


Yesterday, I developed a sore throat that persisted into today. I have 

developed 


some generalized weakness and I just feel sick. I am very sleepy. I can't 

really 


get around too well."





Patient reports a cough with yellow sputum production that started today. Lung 


sounds anteriorly are clear with expiratory rubs. All right lung fields 


posteriorly have expiratory rubs. Left lung fields posteriorly are clear.





History of Present Illness


The patient is an 82 year old female who presents to the Emergency Room with 

complaints of persistent weakness starting earlier today. Yesterday she started 

having a sore throat. She did a warm saltwater gargle and drank soup and hot 

tea. Today her sore throat seemed to improve, but she started to feel weak. She 

is having difficulty walking without leaning on furniture. She states that she 

is constipated, "woozy", and coughing. She notes that she has had some 

incontinence today which she has had before. She denies any vomiting, diarrhea, 

dizziness, room spinning, fever, headache, abdominal pain, or urinary symptoms. 

She believes she is keeping up with her fluids. She had similar symptoms 3 

years ago with bronchitis and was sent home after being given Levaquin. She was 

here 2 weeks ago and had an MRI and US of the neck which was normal and 

revealed no stroke. She was sent to [facility] to strengthen up for her 

upcoming hip replacement. She was sent home 2 days ago. She lives alone.





   Source of History:  patient


   Onset:  earlier today


   Position:  other (global)


   Quality:  other (weakness)


   Timing:  other (persistent)


   Associated Symptoms:  + sorethroat, + cough, No fevers, No headache, No 

vomiting, No abdominal pain, No diarrhea, No urinary symptoms


Note:


Pt reports constipation, woozy, incontinence. Pt denies dizziness, room 

spinning.





Review of Systems


See HPI for pertinent positives & negatives. A total of 10 systems reviewed and 

were otherwise negative.





Past Medical & Surgical


Medical Problems:


(1) Benign hypertension


(2) breast cancer


(3) idiopathic angioedema 


(4) Replacement of total knee joint


(5) Sleep apnea





Old medical records were reviewed. Nurse's notes were reviewed and I agree with.





Family History


Noncontributory secondary to age.





Social History


Smoking Status:  Never Smoker


Drug Use:  none


Marital Status:  


Housing Status:  lives alone


Occupation Status:  retired





Current/Historical Medications


Scheduled


Azelastine Hcl (Astelin Nasal Spray), 2 SPRAYS DIEGO BID


Calcium (Calcium), 1 TAB PO DAILY


Cholecalciferol (Vitamin D3), 2,000 INTER.UNIT PO DAILY


Glucosamine-Chondroitin-Vit C- (Glucosamine Chondroitin), 1 TAB PO DAILY


Hydrochlorothiazide (Hctz), 12.5 MG PO DAILY


Loratadine (Claritin), 10 MG PO DAILY


Timolol Maleate (Timolol Maleate), 1 DROP OPB QAM





Scheduled PRN


Acetaminophen Tab (Tylenol), 650 MG PO TID PRN for Pain


Acetaminophen/Codeine (Tylenol W/Codeine #3), 1 TAB PO Q6 PRN for Pain


Epinephrine (Epipen), 0.3 MG IM UD PRN for ALLERGIC REACTION





Allergies


Coded Allergies:  


     Aspirin (Verified  Allergy, Severe, TONGUE AND THROAT SWELLS, 8/11/17)


     Cephalexin (Verified  Allergy, Severe, swelling tongue and throat, 8/11/17)


     Magnesium Salicylate (Verified  Allergy, Severe, ANAPHYLAXIS, 8/11/17)


     Omeprazole (Verified  Allergy, Severe, ANAPHYLAXIS, 8/11/17)


     Tramadol (Verified  Allergy, Severe, ANAPHYLAXIS, 8/11/17)


     Erythromycin (Verified  Allergy, Unknown, SWELLING AND RASH, 8/11/17)


     Oxycodone (Verified  Allergy, Unknown, UNKNOWN, 8/11/17)


     Tetracycline (Verified  Allergy, Unknown, SWELLING AND RASH, 8/11/17)


     Alendronate (Verified  Adverse Reaction, Unknown, very upset stomach, 8/11/ 17)


Uncoded Allergies:  


     perfumes (Allergy, Unknown, can't breathe, 10/1/12)





Physical Exam


Vital Signs











  Date Time  Temp Pulse Resp B/P (MAP) Pulse Ox O2 Delivery O2 Flow Rate FiO2


 


8/12/17 00:00  69      


 


8/11/17 22:33  71 18 139/85 97 Room Air  


 


8/11/17 21:04  64   97   


 


8/11/17 20:42    149/77    


 


8/11/17 20:34  66 14  98   


 


8/11/17 20:19  65 17  97   


 


8/11/17 20:04  64 28  96   


 


8/11/17 19:58  66      


 


8/11/17 19:56    161/74    


 


8/11/17 19:50 36.8 65 16 161/74 96 Room Air  


 


8/11/17 19:50     94 Room Air  











Physical Exam


General: Non ill appearing older female in no acute distress. Well developed 

well nourished, breathing comfortably on room air. Normal speech


HEENT: Normal cephalic atraumatic.  Pupils are equal round and reactive to 

light.  Extraocular movements are intact.  Oropharynx is pink with moist mucous 

membranes.  No swelling of the mouth lips or tongue.


Neck: Supple with a midline trachea.  No meningeal signs or stiffness, no JVD 

or bruits. No Stridor.


Chest: Clear to auscultation bilaterally.  No wheezes or rhonchi.  No increased 

work of breathing.


Heart: regular rate and rhythm. 


Abdomen: Soft nontender, nondistended without rebound guarding or rigidity.  


Extremities: No cyanosis clubbing or edema. No calf tenderness or assymetry. 

Bilateral hip pain with movement which is chronic. 


Spine/Back. Non tender to palpation. No CVA tenderness


Skin: Good turgor without rashes.


Neurologic exam: Cranial nerves two through 12 are intact.  Motor and sensation 

are intact and symmetrical throughout.





Medical Decision & Procedures


ER Provider


Diagnostic Interpretation:


X-ray results as stated below per interpretation by me and the radiologist: 





CHEST ONE VIEW PORTABLE





CLINICAL HISTORY: Chest pain.    





COMPARISON STUDY:  Chest radiograph July 28, 2017.





FINDINGS: The patient is rotated. No pneumothorax or pleural effusion is


identified. There is no evidence of pulmonary edema. Cardiac size is within


normal limits. No consolidation is identified. 





IMPRESSION:  No acute cardiopulmonary findings. 





Electronically signed by:  Raz Steiner M.D.


8/11/2017 9:12 PM





Dictated Date/Time:  8/11/2017 9:11 PM





Laboratory Results


8/11/17 20:25








Red Blood Count 4.16, Mean Corpuscular Volume 92.1, Mean Corpuscular Hemoglobin 

31.7, Mean Corpuscular Hemoglobin Concent 34.5, Mean Platelet Volume 9.5, 

Neutrophils (%) (Auto) 60.9, Lymphocytes (%) (Auto) 29.7, Monocytes (%) (Auto) 

6.9, Eosinophils (%) (Auto) 2.0, Basophils (%) (Auto) 0.3, Neutrophils # (Auto) 

3.98, Lymphocytes # (Auto) 1.94, Monocytes # (Auto) 0.45, Eosinophils # (Auto) 

0.13, Basophils # (Auto) 0.02





8/11/17 20:25

















Test


  8/11/17


20:25 8/11/17


20:55 8/11/17


21:00


 


White Blood Count


  6.53 K/uL


(4.8-10.8) 


  


 


 


Red Blood Count


  4.16 M/uL


(4.2-5.4) 


  


 


 


Hemoglobin


  13.2 g/dL


(12.0-16.0) 


  


 


 


Hematocrit 38.3 % (37-47)   


 


Mean Corpuscular Volume


  92.1 fL


() 


  


 


 


Mean Corpuscular Hemoglobin


  31.7 pg


(25-34) 


  


 


 


Mean Corpuscular Hemoglobin


Concent 34.5 g/dl


(32-36) 


  


 


 


Platelet Count


  246 K/uL


(130-400) 


  


 


 


Mean Platelet Volume


  9.5 fL


(7.4-10.4) 


  


 


 


Neutrophils (%) (Auto) 60.9 %   


 


Lymphocytes (%) (Auto) 29.7 %   


 


Monocytes (%) (Auto) 6.9 %   


 


Eosinophils (%) (Auto) 2.0 %   


 


Basophils (%) (Auto) 0.3 %   


 


Neutrophils # (Auto)


  3.98 K/uL


(1.4-6.5) 


  


 


 


Lymphocytes # (Auto)


  1.94 K/uL


(1.2-3.4) 


  


 


 


Monocytes # (Auto)


  0.45 K/uL


(0.11-0.59) 


  


 


 


Eosinophils # (Auto)


  0.13 K/uL


(0-0.5) 


  


 


 


Basophils # (Auto)


  0.02 K/uL


(0-0.2) 


  


 


 


RDW Standard Deviation


  43.3 fL


(36.4-46.3) 


  


 


 


RDW Coefficient of Variation


  12.8 %


(11.5-14.5) 


  


 


 


Immature Granulocyte % (Auto) 0.2 %   


 


Immature Granulocyte # (Auto)


  0.01 K/uL


(0.00-0.02) 


  


 


 


Anion Gap


  12.0 mmol/L


(3-11) 


  


 


 


Est Creatinine Clear Calc


Drug Dose 54.6 ml/min 


  


  


 


 


Estimated GFR (


American) 71.4 


  


  


 


 


Estimated GFR (Non-


American 61.6 


  


  


 


 


BUN/Creatinine Ratio 18.5 (10-20)   


 


Calcium Level


  9.1 mg/dl


(8.5-10.1) 


  


 


 


Total Bilirubin


  0.5 mg/dl


(0.2-1) 


  


 


 


Direct Bilirubin


  0.1 mg/dl


(0-0.2) 


  


 


 


Aspartate Amino Transf


(AST/SGOT) 16 U/L (15-37) 


  


  


 


 


Alanine Aminotransferase


(ALT/SGPT) 18 U/L (12-78) 


  


  


 


 


Alkaline Phosphatase


  92 U/L


() 


  


 


 


Total Protein


  7.3 gm/dl


(6.4-8.2) 


  


 


 


Albumin


  3.6 gm/dl


(3.4-5.0) 


  


 


 


Lipase


  152 U/L


() 


  


 


 


Bedside Troponin I


  


  < 0.030 ng/ml


(0-0.045) 


 


 


Urine Color   YELLOW 


 


Urine Appearance   CLEAR (CLEAR) 


 


Urine pH   7.5 (4.5-7.5) 


 


Urine Specific Gravity


  


  


  1.009


(1.000-1.030)


 


Urine Protein   NEG (NEG) 


 


Urine Glucose (UA)   NEG (NEG) 


 


Urine Ketones   NEG (NEG) 


 


Urine Occult Blood   NEG (NEG) 


 


Urine Nitrite   NEG (NEG) 


 


Urine Bilirubin   NEG (NEG) 


 


Urine Urobilinogen   NEG (NEG) 


 


Urine Leukocyte Esterase   TRACE (NEG) 


 


Urine WBC (Auto)   1-5 /hpf (0-5) 


 


Urine RBC (Auto)   0-4 /hpf (0-4) 


 


Urine Hyaline Casts (Auto)   0 /lpf (0-5) 


 


Urine Epithelial Cells (Auto)


  


  


  5-10 /lpf


(0-5)


 


Urine Bacteria (Auto)   NEG (NEG) 





Laboratory studies as stated above per my review.





Medications Administered











 Medications


  (Trade)  Dose


 Ordered  Sig/Hanane


 Route  Start Time


 Stop Time Status Last Admin


Dose Admin


 


 Sodium Chloride  1,000 ml @ 


 999 mls/hr  Q1H1M STAT


 IV  8/11/17 20:40


 8/11/17 21:40 DC 8/11/17 21:09


999 MLS/HR


 


 Sodium Chloride  1,000 ml @ 


 150 mls/hr  Q6H40M ONCE


 IV  8/11/17 20:40


 8/12/17 03:19  8/11/17 21:10


150 MLS/HR











ECG


Indication:  weakness


Rate (beats per minute):  62


Rhythm:  normal sinus


Findings:  no acute ischemic change, no ectopy


Comparison ECG Date:  no prior available





ED Course


2031: Past medical records reviewed. The patient was evaluated in room B12B, 

and a complete history and physical examination were performed.





2040: NSS 1000 ml @ 150 mls/hr IV, NSS 1000 ml @ 999 mls/hr IV. 





2257: I reevaluated the patient. She will have an ambulatory trial. She lives 

alone and her family is out of town. She states that she is comfortable going 

home. 





2330: The patient had difficulty with her ambulatory trial. I have contacted 

the  to try and set up outpatient management. 





2343: I reevaluated the patient. I updated her on the results and plan.





Medical Decision


Differentials include, but are not limited to; infection, pneumonia, arrhythmia

, anemia, electrolyte or metabolic abnormality.





This patient comes in as described above.  She was placed in room B12.  She 

felt sick earlier she had a sore throat yesterday just felt generally weak.  

Today she's been having a hard time getting around with pain in both her hip s.

  He just recently admitted to the hospital for similar spell and had an MRI 

and carotid ultrasound which was unremarkable.  She lives by herself and her 

family members are all out-of-town as are the neighbor.  She's been on a hard 

time getting around she's had no fall.  EKG does not show anything to suggest 

acute coronary syndrome or arrhythmia.  She's had no acute electrode or 

metabolic abnormalities.  She's had nothing to suggest infection.  Her workup 

here was unremarkable however when I try to get her up to walk her she can only 

walk a short distance and is very unsteady on her feet and I am very concerned 

about her being a fall risk she does not feel safe going home either.  I had  

our  evaluate her for possible referral for [facility] 

rehabilitation.   after talking to 10.  The patient said that she actually 

wants to go home and forgot that she has home instead coming to her house from 9

-5 tomorrow a neighbor is going to help her tomorrow evening , home instead is 

going to come in on Sunday morning.  Her daughter return home Sunday evening to 

help her Sunday evening.  She does have a lineup of people to help her in the 

house.  The only exception to this is this evening.  It is 12:30 and we will 

let her sleep here until the morning and home instead can pick her up here this 

is the game plan at this point.  The patient was signed out to Dr. Amos and 

will be sleeping in the emergency department with the plan to go home with home 

instead the morning when she has some help at home.





Medication Reconcilliation


Current Medication List:  was personally reviewed by me





Blood Pressure Screening


Patient's blood pressure:  Elevated blood pressure


Blood pressure disposition:  Referred to PCP





Impression





 Primary Impression:  


 Weakness


 Additional Impression:  


 Bilateral hip pain





Scribe Attestation


The scribe's documentation has been prepared under my direction and personally 

reviewed by me in its entirety. I confirm that the note above accurately 

reflects all work, treatment, procedures, and medical decision making performed 

by me.





Departure Information


Referrals


Keon Gross M.D. (PCP)





Patient Instructions


My [facility]





Problem Qualifiers

## 2017-08-12 NOTE — EMERGENCY ROOM VISIT NOTE
ED Visit Note


Received sign out from Dr Dumont.  H&P verified by me.  Pt bunking in ED 

tonight until she can receive ride home in the morning.


Problem List


Medical Problems:


(1) Benign hypertension


Status: Chronic  





(2) breast cancer


Status: Chronic  





(3) idiopathic angioedema 


Status: Chronic  





(4) Replacement of total knee joint


Status: Resolved  





(5) Sleep apnea


Status: Chronic  











Current/Historical Medications


Scheduled


Azelastine Hcl (Astelin Nasal Spray), 2 SPRAYS DIEGO BID


Calcium (Calcium), 1 TAB PO DAILY


Cholecalciferol (Vitamin D3), 2,000 INTER.UNIT PO DAILY


Glucosamine-Chondroitin-Vit C- (Glucosamine Chondroitin), 1 TAB PO DAILY


Hydrochlorothiazide (Hctz), 12.5 MG PO DAILY


Loratadine (Claritin), 10 MG PO DAILY


Timolol Maleate (Timolol Maleate), 1 DROP OPB QAM





Scheduled PRN


Acetaminophen Tab (Tylenol), 650 MG PO TID PRN for Pain


Acetaminophen/Codeine (Tylenol W/Codeine #3), 1 TAB PO Q6 PRN for Pain


Epinephrine (Epipen), 0.3 MG IM UD PRN for ALLERGIC REACTION





Allergies


Coded Allergies:  


     Aspirin (Verified  Allergy, Severe, TONGUE AND THROAT SWELLS, 8/11/17)


     Cephalexin (Verified  Allergy, Severe, swelling tongue and throat, 8/11/17)


     Magnesium Salicylate (Verified  Allergy, Severe, ANAPHYLAXIS, 8/11/17)


     Omeprazole (Verified  Allergy, Severe, ANAPHYLAXIS, 8/11/17)


     Tramadol (Verified  Allergy, Severe, ANAPHYLAXIS, 8/11/17)


     Erythromycin (Verified  Allergy, Unknown, SWELLING AND RASH, 8/11/17)


     Oxycodone (Verified  Allergy, Unknown, UNKNOWN, 8/11/17)


     Tetracycline (Verified  Allergy, Unknown, SWELLING AND RASH, 8/11/17)


     Alendronate (Verified  Adverse Reaction, Unknown, very upset stomach, 8/11/ 17)


Uncoded Allergies:  


     perfumes (Allergy, Unknown, can't breathe, 10/1/12)





Vital Signs











  Date Time  Temp Pulse Resp B/P (MAP) Pulse Ox O2 Delivery O2 Flow Rate FiO2


 


8/12/17 00:00  69      


 


8/11/17 22:33  71 18 139/85 97 Room Air  


 


8/11/17 21:04  64   97   


 


8/11/17 20:42    149/77    


 


8/11/17 20:34  66 14  98   


 


8/11/17 20:19  65 17  97   


 


8/11/17 20:04  64 28  96   


 


8/11/17 19:58  66      


 


8/11/17 19:56    161/74    


 


8/11/17 19:50 36.8 65 16 161/74 96 Room Air  


 


8/11/17 19:50     94 Room Air  











Laboratory Results


8/11/17 20:25








Red Blood Count 4.16, Mean Corpuscular Volume 92.1, Mean Corpuscular Hemoglobin 

31.7, Mean Corpuscular Hemoglobin Concent 34.5, Mean Platelet Volume 9.5, 

Neutrophils (%) (Auto) 60.9, Lymphocytes (%) (Auto) 29.7, Monocytes (%) (Auto) 

6.9, Eosinophils (%) (Auto) 2.0, Basophils (%) (Auto) 0.3, Neutrophils # (Auto) 

3.98, Lymphocytes # (Auto) 1.94, Monocytes # (Auto) 0.45, Eosinophils # (Auto) 

0.13, Basophils # (Auto) 0.02





8/11/17 20:25

















Test


  8/11/17


20:25 8/11/17


20:55 8/11/17


21:00


 


White Blood Count


  6.53 K/uL


(4.8-10.8) 


  


 


 


Red Blood Count


  4.16 M/uL


(4.2-5.4) 


  


 


 


Hemoglobin


  13.2 g/dL


(12.0-16.0) 


  


 


 


Hematocrit 38.3 % (37-47)   


 


Mean Corpuscular Volume


  92.1 fL


() 


  


 


 


Mean Corpuscular Hemoglobin


  31.7 pg


(25-34) 


  


 


 


Mean Corpuscular Hemoglobin


Concent 34.5 g/dl


(32-36) 


  


 


 


Platelet Count


  246 K/uL


(130-400) 


  


 


 


Mean Platelet Volume


  9.5 fL


(7.4-10.4) 


  


 


 


Neutrophils (%) (Auto) 60.9 %   


 


Lymphocytes (%) (Auto) 29.7 %   


 


Monocytes (%) (Auto) 6.9 %   


 


Eosinophils (%) (Auto) 2.0 %   


 


Basophils (%) (Auto) 0.3 %   


 


Neutrophils # (Auto)


  3.98 K/uL


(1.4-6.5) 


  


 


 


Lymphocytes # (Auto)


  1.94 K/uL


(1.2-3.4) 


  


 


 


Monocytes # (Auto)


  0.45 K/uL


(0.11-0.59) 


  


 


 


Eosinophils # (Auto)


  0.13 K/uL


(0-0.5) 


  


 


 


Basophils # (Auto)


  0.02 K/uL


(0-0.2) 


  


 


 


RDW Standard Deviation


  43.3 fL


(36.4-46.3) 


  


 


 


RDW Coefficient of Variation


  12.8 %


(11.5-14.5) 


  


 


 


Immature Granulocyte % (Auto) 0.2 %   


 


Immature Granulocyte # (Auto)


  0.01 K/uL


(0.00-0.02) 


  


 


 


Anion Gap


  12.0 mmol/L


(3-11) 


  


 


 


Est Creatinine Clear Calc


Drug Dose 54.6 ml/min 


  


  


 


 


Estimated GFR (


American) 71.4 


  


  


 


 


Estimated GFR (Non-


American 61.6 


  


  


 


 


BUN/Creatinine Ratio 18.5 (10-20)   


 


Calcium Level


  9.1 mg/dl


(8.5-10.1) 


  


 


 


Total Bilirubin


  0.5 mg/dl


(0.2-1) 


  


 


 


Direct Bilirubin


  0.1 mg/dl


(0-0.2) 


  


 


 


Aspartate Amino Transf


(AST/SGOT) 16 U/L (15-37) 


  


  


 


 


Alanine Aminotransferase


(ALT/SGPT) 18 U/L (12-78) 


  


  


 


 


Alkaline Phosphatase


  92 U/L


() 


  


 


 


Total Protein


  7.3 gm/dl


(6.4-8.2) 


  


 


 


Albumin


  3.6 gm/dl


(3.4-5.0) 


  


 


 


Lipase


  152 U/L


() 


  


 


 


Bedside Troponin I


  


  < 0.030 ng/ml


(0-0.045) 


 


 


Urine Color   YELLOW 


 


Urine Appearance   CLEAR (CLEAR) 


 


Urine pH   7.5 (4.5-7.5) 


 


Urine Specific Gravity


  


  


  1.009


(1.000-1.030)


 


Urine Protein   NEG (NEG) 


 


Urine Glucose (UA)   NEG (NEG) 


 


Urine Ketones   NEG (NEG) 


 


Urine Occult Blood   NEG (NEG) 


 


Urine Nitrite   NEG (NEG) 


 


Urine Bilirubin   NEG (NEG) 


 


Urine Urobilinogen   NEG (NEG) 


 


Urine Leukocyte Esterase   TRACE (NEG) 


 


Urine WBC (Auto)   1-5 /hpf (0-5) 


 


Urine RBC (Auto)   0-4 /hpf (0-4) 


 


Urine Hyaline Casts (Auto)   0 /lpf (0-5) 


 


Urine Epithelial Cells (Auto)


  


  


  5-10 /lpf


(0-5)


 


Urine Bacteria (Auto)   NEG (NEG) 











Medications Administered











 Medications


  (Trade)  Dose


 Ordered  Sig/Hanane


 Route  Start Time


 Stop Time Status Last Admin


Dose Admin


 


 Sodium Chloride  1,000 ml @ 


 999 mls/hr  Q1H1M STAT


 IV  8/11/17 20:40


 8/11/17 21:40 DC 8/11/17 21:09


999 MLS/HR


 


 Sodium Chloride  1,000 ml @ 


 150 mls/hr  Q6H40M ONCE


 IV  8/11/17 20:40


 8/12/17 03:19  8/11/17 21:10


150 MLS/HR











Departure Information


Impression





 Primary Impression:  


 Weakness


 Additional Impression:  


 Bilateral hip pain





Referrals


Keon Gross M.D. (PCP)





Forms


HOME CARE DOCUMENTATION FORM,                                                 

               IMPORTANT VISIT INFORMATION





Patient Instructions


[facility]





Additional Instructions





Rest.


Drink plenty of fluids.  Be careful and getting up and down


Return if:  fever or chills, worsening of symptoms, any new problems or concerns





Follow-up with your doctor Monday for recheck





Problem Qualifiers

## 2017-08-14 NOTE — PAT MEDICATION INSTRUCTIONS
Service Date


Aug 14, 2017.





Current Home Medication List


Acetaminophen Tab (Tylenol), 650 MG PO TID PRN for Pain


Acetaminophen/Codeine (Tylenol W/Codeine #3), 1 TAB PO Q6 PRN for Pain


Acetaminophen/Diphenhydramine (Tylenol Pm), 1 TAB PO HS PRN for Insomnia


Azelastine Hcl (Astelin Nasal Spray), 2 SPRAYS DIEGO BID


Calcium Citrate (Calcium Citrate), 1 TAB PO QPM


Cholecalciferol (Vitamin D3), 1 TAB PO QAM


Diphenhydramine Hcl (Benadryl Allergy), 1 CAP PO DAILY PRN for prn


Epinephrine (Epipen), 0.3 MG IM UD PRN for ALLERGIC REACTION


Fluticasone Propionate (Nasal) (Flonase Allergy Relief), 1 SPRAY DIEGO DAILY


Hydrochlorothiazide (Hctz), 12.5 MG PO QAM


Lidocaine HCl (Lidocaine HCl), 1 DOSE TOP UD PRN for Pain


Nystatin/Triamcinolone (Mycogen || ), 1 DOSE TOP PRN


Polyethylene Glycol 3350 (Miralax), 17 GM PO QPM


Psyllium (Metamucil), 1 DOSE PO QPM


Timolol Maleate (Ophth) (Timoptic 0.25% Oph), 1 DROP OPB QAM


Verapamil (Calan), 80 MG PO UD


Verapamil (Verelan Pm), 100 MG PO UD


[Ketoconazole Shampoo], 1 DOSE TOP UD





Medication Instructions


For Your Scheduled Surgery 








- Hold the following medications 24 hours prior to surgery:


[Ketoconazole Shampoo], 1 DOSE TOP UD


Lidocaine HCl (Lidocaine HCl), 1 DOSE TOP UD PRN for Pain


Nystatin/Triamcinolone (Mycogen || ), 1 DOSE TOP PRN








- Hold the following medications the morning of surgery:


Hydrochlorothiazide (Hctz), 12.5 MG PO QAM


Diphenhydramine Hcl (Benadryl Allergy), 1 CAP PO DAILY PRN for prn


Cholecalciferol (Vitamin D3), 1 TAB PO QAM








- Take the following medications the morning of surgery with a sip of water:


Acetaminophen Tab (Tylenol), 650 MG PO TID PRN for Pain  (if needed)


Acetaminophen/Codeine (Tylenol W/Codeine #3), 1 TAB PO Q6 PRN for Pain (okay to 

take up to 4 hours prior to surgery if needed)


Azelastine Hcl (Astelin Nasal [place]), 2 SPRAYS DIEGO BID


Epinephrine (Epipen), 0.3 MG IM UD PRN for ALLERGIC REACTION (if needed)


Fluticasone Propionate (Nasal) (Flonase Allergy Relief), 1 SPRAY DIEGO DAILY


Timolol Maleate (Ophth) (Timoptic 0.25% Oph), 1 DROP OPB QAM


Verapamil (Calan), 80 MG PO UD








- Take the following medications as scheduled the night before surgery:


Verapamil (Verelan Pm), 100 MG PO UD


Polyethylene Glycol 3350 (Miralax), 17 GM PO QPM


Psyllium (Metamucil), 1 DOSE PO QPM


Epinephrine (Epipen), 0.3 MG IM UD PRN for ALLERGIC REACTION  (if needed)


Calcium Citrate (Calcium Citrate), 1 TAB PO QPM 


Acetaminophen/Diphenhydramine (Tylenol Pm), 1 TAB PO HS PRN for Insomnia  (if 

needed)


Azelastine Hcl (Astelin Nasal [place]), 2 SPRAYS DIEGO BID








If you have any questions please call us at [phone] or [phone] or 

[phone]

## 2017-08-22 NOTE — HISTORY & PHYSICAL EXAMINATION
DATE OF ADMISSION:  2017

 

CHIEF COMPLAINT:  Bilateral hip pain, left greater than right.

 

HISTORY OF PRESENT ILLNESS:  This 82-year-old white female presents to the

office with complaints of bilateral hip pain, left greater than right that

has been present for several years.  It has become worse with time. 

Currently, her left hip is worse than the right.  She is ambulatory using a

walker.  She is using chronic Tylenol No.3 for pain control until she can get

her hips replaced.  No specific injury.  Pain is worse with weightbearing. 

It is affecting her ADLs.  She has tried activity modification as well as

oral pain medication without lasting relief.  She cannot take NSAIDs due to

allergy.  She notes some loss of motion.  No numbness or tingling.  She

elects to proceed with total hip arthroplasty in hopes of alleviating some

pain.

 

PAST MEDICAL HISTORY:  Significant for elevated cholesterol, aortic aneurysm,

irregular heartbeat, sleep apnea, use of BiPAP, osteoarthritis, dry mouth,

history of skin and breast cancer, hypertension, and adult-situational stress

disorder.

 

PREVIOUS SURGERIES:  Left total knee arthroplasty in , breast lumpectomy

in , and knee arthrotomy with partial meniscectomy in .

 

ALLERGIES:  KNOWN ALLERGY TO ASPIRIN, KEFLEX, ERYTHROMYCIN, FOSAMAX, NEXIUM,

NYSTATIN, TETRACYCLINE.  Specific allergic reaction to all of these is

an unknown reaction.  AUGMENTIN CAUSES HER NAUSEA.  She takes amoxicillin

without issue and has been tested.  She has also been tested for titanium and

nickel allergy and found to be nonreactive.

 

CURRENT MEDICATIONS:  Tylenol N.3 q.i.d. p.r.n., azelastine 137 mcg inhaler 2

sprays in each nostril b.i.d. p.r.n., Benadryl 25 mg p.o. t.i.d. p.r.n.,

calcium daily, EpiPen 0.3 mg IM p.r.n. for anaphylaxis, HCTZ 12.5 mg daily,

and Tylenol p.o. daily p.r.n.

 

FAMILY HISTORY:  Noncontributory.  Parents are .

 

SOCIAL HISTORY:  The patient is retired.  .  No tobacco use, rare ETOH

use.

 

REVIEW OF SYSTEMS:  Significant for above-stated conditions, otherwise

unremarkable.

 

PHYSICAL EXAMINATION:

GENERAL:  Well-developed, frail, elderly white female in no acute distress. 

Sitting in a chair.  Alert and oriented.

SKIN:  Warm and dry with fair turgor.  No rashes or lesions.  No ecchymosis

or erythema.

HEENT:  Normocephalic, atraumatic.  Eyes PERRLA, EOMI.  Nares patent

bilaterally without turbinate enlargement.  Oropharynx without erythema or

exudate.  No lesions noted.  Uvula midline.  Oral mucosa moist.  Fair

dentition.

HEART:  Irregularly irregular.  No murmurs, gallops or rubs.

LUNGS:  Clear to auscultation bilaterally.  No crackles, rhonchi or wheezing.

 Good air movement.

ABDOMEN:  Mildly obese.  Bowel sounds present x4, soft, nontender.  No

organomegaly.  No masses.

MUSCULOSKELETAL:  Left hip has no obvious asymmetry or deformity.  She has

mild discomfort with palpation over the greater trochanter.  No pain with

palpation over the IT band.  Limited range of motion of the hip secondary to

discomfort.  Flexion to just beyond 90 degrees, very limited internal and

external rotation secondary to pain.  No palpable crepitus.  Significant

difficulty with walking.

NEUROLOGIC:  Cranial nerves II through XII are intact.  Gross sensation is

intact across both lower extremities by soft touch.  Peripheral pulses are

2+.

 

DATA:  Radiographic imaging previously obtained shows endstage DJD of both

hips, left greater than right.  She has loss of joint space, periarticular

osteophytes, and subchondral sclerosis and cysts.

 

IMPRESSION:  Left hip end-stage degenerative joint disease.

 

PLAN:  Medical clearance and cardiology clearance requests have been placed. 

Continue with her Tylenol No.3 p.r.n.  Postoperative prescriptions for

Percocet and Coumadin will be provided at discharge from the hospital. 

Preoperative lab work, EKG, and chest x-ray have been ordered.  Anticipate

discharge to [facility] or a skilled nursing facility as she has no one

available to help her at home.  Informed written consent will be obtained by

Dr. Nascimento on the morning of surgery.

 

 

 

[facility]

## 2017-08-25 ENCOUNTER — HOSPITAL ENCOUNTER (OUTPATIENT)
Dept: HOSPITAL 45 - C.RAD | Age: 82
Discharge: HOME | End: 2017-08-25
Attending: INTERNAL MEDICINE
Payer: COMMERCIAL

## 2017-08-25 DIAGNOSIS — K59.03: Primary | ICD-10-CM

## 2017-08-25 NOTE — DIAGNOSTIC IMAGING REPORT
BAHMAN



CLINICAL HISTORY: 82 years-old Female presenting with constipation due to

opioids. 



TECHNIQUE: Single supine view of the abdomen was obtained.



COMPARISON:  3/18/2017.



FINDINGS:

Marked stool burden throughout the colon most prominently in the right colon not

dissimilar in appearance to prior pelvic radiograph from March 2017. No bowel

obstruction. No gross pneumoperitoneum. Degenerative changes of the spine.

Severe degenerative changes of bilateral hips. Atherosclerosis.



IMPRESSION:

1.  Marked stool burden consistent with constipation.



2.  Severe degenerative changes of the bilateral hips.







Electronically signed by:  Keon Tong M.D.

8/25/2017 4:00 PM



Dictated Date/Time:  8/25/2017 3:58 PM

## 2017-08-30 ENCOUNTER — HOSPITAL ENCOUNTER (INPATIENT)
Dept: HOSPITAL 45 - C.ACU | Age: 82
LOS: 1 days | Discharge: NURSING, REHAB OR LONG-TERM CARE | DRG: 470 | End: 2017-08-31
Attending: ORTHOPAEDIC SURGERY | Admitting: ORTHOPAEDIC SURGERY
Payer: COMMERCIAL

## 2017-08-30 VITALS — HEART RATE: 78 BPM | SYSTOLIC BLOOD PRESSURE: 144 MMHG | TEMPERATURE: 97.16 F | DIASTOLIC BLOOD PRESSURE: 83 MMHG

## 2017-08-30 VITALS
DIASTOLIC BLOOD PRESSURE: 70 MMHG | OXYGEN SATURATION: 95 % | SYSTOLIC BLOOD PRESSURE: 115 MMHG | HEART RATE: 87 BPM | TEMPERATURE: 98.42 F

## 2017-08-30 VITALS
OXYGEN SATURATION: 97 % | SYSTOLIC BLOOD PRESSURE: 108 MMHG | TEMPERATURE: 98.42 F | HEART RATE: 75 BPM | DIASTOLIC BLOOD PRESSURE: 66 MMHG

## 2017-08-30 VITALS — DIASTOLIC BLOOD PRESSURE: 84 MMHG | SYSTOLIC BLOOD PRESSURE: 145 MMHG | TEMPERATURE: 97.52 F | HEART RATE: 86 BPM

## 2017-08-30 VITALS
DIASTOLIC BLOOD PRESSURE: 84 MMHG | HEART RATE: 78 BPM | OXYGEN SATURATION: 98 % | TEMPERATURE: 99.32 F | SYSTOLIC BLOOD PRESSURE: 162 MMHG

## 2017-08-30 VITALS — DIASTOLIC BLOOD PRESSURE: 91 MMHG | HEART RATE: 83 BPM | SYSTOLIC BLOOD PRESSURE: 160 MMHG | OXYGEN SATURATION: 96 %

## 2017-08-30 VITALS
HEIGHT: 66 IN | BODY MASS INDEX: 27.92 KG/M2 | BODY MASS INDEX: 27.92 KG/M2 | HEIGHT: 66 IN | WEIGHT: 173.72 LBS | WEIGHT: 173.72 LBS

## 2017-08-30 VITALS — DIASTOLIC BLOOD PRESSURE: 84 MMHG | TEMPERATURE: 99.14 F | SYSTOLIC BLOOD PRESSURE: 146 MMHG | HEART RATE: 82 BPM

## 2017-08-30 VITALS
SYSTOLIC BLOOD PRESSURE: 153 MMHG | DIASTOLIC BLOOD PRESSURE: 77 MMHG | TEMPERATURE: 97.88 F | OXYGEN SATURATION: 100 % | HEART RATE: 58 BPM

## 2017-08-30 VITALS
TEMPERATURE: 94.28 F | HEART RATE: 65 BPM | OXYGEN SATURATION: 100 % | SYSTOLIC BLOOD PRESSURE: 144 MMHG | DIASTOLIC BLOOD PRESSURE: 71 MMHG

## 2017-08-30 DIAGNOSIS — I71.4: ICD-10-CM

## 2017-08-30 DIAGNOSIS — Z87.891: ICD-10-CM

## 2017-08-30 DIAGNOSIS — M16.12: Primary | ICD-10-CM

## 2017-08-30 DIAGNOSIS — I10: ICD-10-CM

## 2017-08-30 DIAGNOSIS — G47.33: ICD-10-CM

## 2017-08-30 DIAGNOSIS — Q83.9: ICD-10-CM

## 2017-08-30 DIAGNOSIS — Z92.3: ICD-10-CM

## 2017-08-30 DIAGNOSIS — M81.0: ICD-10-CM

## 2017-08-30 DIAGNOSIS — Z96.652: ICD-10-CM

## 2017-08-30 LAB
EOSINOPHIL NFR BLD AUTO: 228 K/UL (ref 130–400)
HCT VFR BLD CALC: 37.5 % (ref 37–47)
INR PPP: 1 (ref 0.9–1.1)
MCH RBC QN AUTO: 31.7 PG (ref 25–34)
MCHC RBC AUTO-ENTMCNC: 35.2 G/DL (ref 32–36)
MCV RBC AUTO: 90.1 FL (ref 80–100)
PMV BLD AUTO: 9.2 FL (ref 7.4–10.4)
PROTHROMBIN TIME: 10.7 SECONDS (ref 9–12)
RBC # BLD AUTO: 4.16 M/UL (ref 4.2–5.4)
WBC # BLD AUTO: 10.46 K/UL (ref 4.8–10.8)

## 2017-08-30 PROCEDURE — 0SRB0JA REPLACEMENT OF LEFT HIP JOINT WITH SYNTHETIC SUBSTITUTE, UNCEMENTED, OPEN APPROACH: ICD-10-PCS | Performed by: ORTHOPAEDIC SURGERY

## 2017-08-30 RX ADMIN — FERROUS GLUCONATE SCH MG: 324 TABLET ORAL at 18:38

## 2017-08-30 RX ADMIN — FERROUS GLUCONATE SCH MG: 324 TABLET ORAL at 12:53

## 2017-08-30 RX ADMIN — VANCOMYCIN HYDROCHLORIDE SCH MLS/HR: 1 INJECTION, POWDER, LYOPHILIZED, FOR SOLUTION INTRAVENOUS at 05:30

## 2017-08-30 RX ADMIN — ACETAMINOPHEN SCH MG: 500 TABLET, COATED ORAL at 16:00

## 2017-08-30 RX ADMIN — ALUMINUM ZIRCONIUM TRICHLOROHYDREX GLY SCH EA: 0.2 STICK TOPICAL at 23:03

## 2017-08-30 RX ADMIN — TIMOLOL MALEATE SCH DROPS: 5 SOLUTION OPHTHALMIC at 09:00

## 2017-08-30 RX ADMIN — VANCOMYCIN HYDROCHLORIDE SCH MLS/HR: 1 INJECTION, POWDER, LYOPHILIZED, FOR SOLUTION INTRAVENOUS at 05:49

## 2017-08-30 RX ADMIN — OXYCODONE HYDROCHLORIDE PRN MG: 5 TABLET ORAL at 16:53

## 2017-08-30 RX ADMIN — ALUMINUM ZIRCONIUM TRICHLOROHYDREX GLY SCH EA: 0.2 STICK TOPICAL at 15:57

## 2017-08-30 RX ADMIN — DOCUSATE SODIUM SCH MG: 100 CAPSULE, LIQUID FILLED ORAL at 22:00

## 2017-08-30 RX ADMIN — OXYCODONE HYDROCHLORIDE PRN MG: 5 TABLET ORAL at 13:02

## 2017-08-30 RX ADMIN — FLUTICASONE PROPIONATE SCH SPRAYS: 50 SPRAY, METERED NASAL at 09:00

## 2017-08-30 RX ADMIN — ALUMINUM ZIRCONIUM TRICHLOROHYDREX GLY SCH EA: 0.2 STICK TOPICAL at 23:04

## 2017-08-30 RX ADMIN — OXYCODONE HYDROCHLORIDE PRN MG: 5 TABLET ORAL at 22:07

## 2017-08-30 RX ADMIN — HYDROCHLOROTHIAZIDE SCH MG: 25 TABLET ORAL at 09:00

## 2017-08-30 RX ADMIN — ACETAMINOPHEN SCH MG: 500 TABLET, COATED ORAL at 23:52

## 2017-08-30 NOTE — OPERATIVE REPORT
DATE OF OPERATION:  08/30/2017

 

PREOPERATIVE DIAGNOSIS:  Severe osteoarthritis, left hip.

 

POSTOPERATIVE DIAGNOSIS:  Same.

 

OPERATION PERFORMED:  Noncemented left total hip replacement.

 

SURGEON:  Dr. Nascimento.

 

ASSISTANT:  Aaron Coy PA-C.  No resident or fellow available.

 

SUMMARY OF IMPLANTS:  Size 50 cup acetabular shell sector hole eliminator 6.5

x 25 screw, acetabular liner neutral 32 internal diameter, 50 outer diameter,

7 high offset femoral stem, 32 mm head +5.

 

ESTIMATED BLOOD LOSS:  Estimated at 150.

 

CRYSTALLOID:  1500.  

 

Deep venous thrombosis prophylaxis per protocol.

 

PERIOPERATIVE SITUATION:  Medically cleared female with intractable hip pain

with x-rays revealing end-stage disease, significant joint space narrowing,

subchondral cyst and  periarticular osteophytes.

 

OPERATION AND FINDINGS: 

 

PROCEDURE:  The patient appropriately identified, site verified, consent

verified, vancomycin confirmed as being given weight-based dosed.  The left

lower extremity was prepped and draped in usual routine fashion with the

patient in right lateral decubitus position.  A posterior approach to the hip

was then made.  Full thickness flaps raised.  IT band identified.  Gluteus

atul fascia identified and incised.  Retractors placed.  Care taken to

protect the sciatic nerve.  Short external rotators were identified were

released.  The capsule was then teed, the hip was then dislocated, the

femoral neck was then resected.  Excellent exposure was obtained.  Serial

reaming carried up to a 50 and a 50 cup impacted into appropriate anteversion

and inclination.  It was then secured with an additional 6.5 x 25 screw with

excellent purchase.  Trial liner was then seated.  Some marginal osteophytes

then resected.  The femur was then flexed and internally rotated.  The

proximal femur prepared with a , canal finder, lateralizing rasp,

and serial reaming up to a size 7,  size 7 high offset stem with a +5 neck

length gave superb stability and leg lengths were within millimeters of

equality.  It should be mentioned that she was slightly long on the left to

begin with,  probably about 3-4 mm.

 

The hip was then dislocated.  All trial remaining implants were removed. 

Wound irrigated with a Betadine, Pulsavac, hole eliminator seated, permanent

liner seated, permanent head and stem seated and then the hip reduced.  It

was stable in all planes.  It was then irrigated with Betadine and Pulsavac

and then the capsule closed with #2 Vicryl, the short external rotators with

the same stitch, some deep fat with that and then superficial fat with 2-0

Vicryl and the skin was stainless clips.  Appropriate dressing applied and

the patient transferred to recovery room in satisfactory condition having

tolerated the procedure well.

 

 

I attest to the content of the Intraoperative Record and any orders documented therein. Any exception
s are noted below.

## 2017-08-30 NOTE — [facility] POST OPERATIVE BRIEF NOTE
Immediate Operative Summary


Operative Date


Aug 30, 2017.





Pre-Operative Diagnosis





Left Hip degenerative Joint Disease





Post-Operative Diagnosis





Left Hip Degenerative Joint Disease





Procedure(s) Performed





Left Total Hip Arthroplasty Uncemented





Surgeon


Dr. Nascimento





Assistant Surgeon(s)


Aaron Coy PA-C





Estimated Blood Loss


150 ml





Findings


severe djd





Fluids (cc crystalloids)


1500cc





Specimens





A. Left Femoral Head





Drains


none





Anesthesia


spinal





Complication(s)


None





Disposition


Recovery Room / PACU

## 2017-08-30 NOTE — PROGRESS NOTE
DATE: 08/30/2017

 

DATE:  08/30/2017  

 

SUBJECTIVE:  The patient had some concerns about pain medications not being

available because of computer glitch.  At this point in time, she feels that

her pain is well managed.  I asked her to refocus on getting better and not

worried about the  computers.

 

OBJECTIVE:  Vital signs are stable.  She is afebrile.  Neurovascular check

femoral sciatic nerve is good.

 

Wound dressing clean, dry and intact.  Hip located.  Postop X-rays of the

left hip look  excellent.

 

ASSESSMENT:  Doing well status post left total hip replacement and focus on

positive.  Her pain is well managed.  At this point in time she seems to be

having issues with pain medications in general, has multiple questions and

multiple suggestions; however, we will stick to what has been tried in the

past.  She will be transferred to [facility] tentatively tomorrow if bed

available.   Coumadin 5 mg tonight.

## 2017-08-30 NOTE — HISTORY & PHYSICAL BRIDGE NOTE
H&P Re-Evaluation


Bridge Note:


I have examined the patient, reviewed the History & Physical and in the 

interval since the performance of the History & Physical I have noted the 

following changes of clinical significance:consent obtained and questions 

answered. sinus meds noted.

## 2017-08-30 NOTE — [facility] OPERATIVE REPORT
Operative Report


Operative Date


Aug 30, 2017.





Pre-Operative Diagnosis





Left Hip degenerative Joint Disease





Post-Operative Diagnosis





Left Hip Degenerative Joint Disease





Procedure(s) Performed





Left Total Hip Arthroplasty Uncemented





Surgeon


Dr. Nascimento





Assistant Surgeon(s)


Aaron oCy PA-C





Estimated Blood Loss


150 ml





Findings


Left hip DJD





Fluids


1500cc





Specimens





A. Left Femoral Head





Drains


none





Anesthesia


spinal





Complication(s)


None





Disposition


Recovery Room / PACU





Indications


This 82-year-old white female presented to the office with complaints of 

intractable left hip pain.  She had tried conservative care measures without 

success.  She elected to proceed with surgical intervention in hopes of 

alleviating her pain.  Preoperative imaging was obtained.





Description of Procedure


She was a  a spinal anesthetic and then taken to the operating room 

where she was given sedation.  She was prepped and draped in usual sterile 

fashion.  Please see Dr. Nascimento's operative report for specifics of the 

procedure.  I was present for the entire case from initial patient positioning 

through final wound closure.  Assistance was provided in tissue retraction, 

hemostasis, trial implant placement, final implant placement, and final wound 

closure.  Patient was taken to the recovery room in satisfactory condition.


I attest to the content of the Intraoperative Record and any orders documented 

therein.  Any exceptions are noted below.

## 2017-08-30 NOTE — MEDICAL CONSULT
Consultation


Date of Consultation:


Aug 30, 2017.


Attending Physician:


Luis Alberto Nascimento M.D.


Reason for Consultation:


Medical Management


History of Present Illness


This is 81 yo F with PMHx of AAA, HTN, HLD, arthritis, osteoporosis, sleep apnea

, idiopathic angioedema, malignant breast neoplasm in 2011 s/p lumpectomy and 1 

week of XRT, remote tobacco use who presents for a Left total hip arthroplasty 

by Dr. Bernard on 8/30/17. The patient is very dissatisfied with everything, 

angry, yelling, tearful at times due to pain when I initially walked into the 

room.  She says pain is excruciating, and morphine at low doses is not working 

for breakthrough pain at all. She specifically has complaints of the computer 

system, meds not being available on the floor for immediate access, and the 

phone system where she is unable to get hold of who she wants.  I sat and 

talked with her for a long time, calming her, and explaining the current 

medication regimen.  She is awaiting a call back from our , Radha Hope.  

She typically takes tylenol #3 at home for pain, and is requesting to have this 

available to her.  She is also cautious to take narcotics because of chronic 

constipation.  She uses dulcolax and miralax on a daily basis at home.





Past Medical/Surgical History


Medical Problems:


(1) Bilateral hip pain


Status: Acute  





(2) Hyponatremia


Status: Acute  





(3) Left-sided weakness


Status: Acute  





(4) Weakness


Status: Acute  





AAA


Idiopathic Angioedema


JANEY on CPAP


Hx of Malignant neoplasm of the breast





Social History


Smoking Status:  Former Smoker


Smokeless Tobacco Use:  No


Drug Use:  none


Marital Status:  


Housing Status:  lives alone


Occupation Status:  retired





Allergies


Coded Allergies:  


     Aspirin (Verified  Allergy, Severe, TONGUE AND THROAT SWELLS, 8/14/17)


     Cephalexin (Verified  Allergy, Severe, swelling tongue and throat, 8/14/17)


     Magnesium Salicylate (Verified  Allergy, Severe, ANAPHYLAXIS, 8/14/17)


     Omeprazole (Verified  Allergy, Severe, ANAPHYLAXIS, 8/14/17)


     Tramadol (Verified  Allergy, Severe, ANAPHYLAXIS, 8/14/17)


     Erythromycin (Verified  Allergy, Unknown, SWELLING AND RASH, 8/14/17)


     Oxycodone (Verified  Allergy, Unknown, UNKNOWN, 8/14/17)


     Tetracycline (Verified  Allergy, Unknown, SWELLING AND RASH, 8/14/17)


     Alendronate (Verified  Adverse Reaction, Unknown, very upset stomach, 8/14/ 17)


Uncoded Allergies:  


     perfumes (Allergy, Unknown, can't breathe, 10/1/12)





Current Inpatient Medications





Current Inpatient Medications








 Medications


  (Trade)  Dose


 Ordered  Sig/Hanane


 Route  Start Time


 Stop Time Status Last Admin


Dose Admin


 


 Tranexamic Acid


 1000 mg/Sodium


 Chloride  110 ml @ 


 660 mls/hr  TODAY@0600


 IV  8/30/17 06:00


 8/30/17 18:00  8/30/17 06:34


660 MLS/HR


 


 Vancomycin HCl


 1200 mg/Sodium


 Chloride  274 ml @ 


 125 mls/hr  PREOP


 IV  8/30/17 06:00


 8/30/17 18:00  8/30/17 05:30


125 MLS/HR


 


 Ropivacaine 150


 mg/Bupivacaine


 HCl/Epinephrine


 Bitart 30 ml/


 Dexamethasone


 Sodium Phosphate


 4 mg/Ketamine HCl


 10 mg/Clonidine


 100 mcg/Sodium


 Chloride 30 ml/


 Empty Bag  92.2 ml @ 


 0 mls/hr  PREOP


 INFIL  8/30/17 06:00


 8/30/17 18:00  8/30/17 08:05


73.2 MLS/HR


 


 Potassium


 Chloride/Dextrose/


 Sod Cl  1,000 ml @ 


 100 mls/hr  Q10H


 IV  8/30/17 11:00


 8/31/17 08:21  8/30/17 12:08


100 MLS/HR


 


 Vancomycin HCl


 1150 mg/Sodium


 Chloride  273 ml @ 


 125 mls/hr  Q12H


 IV  8/30/17 18:00


 8/30/17 20:12   


 


 


 Morphine Sulfate


  (MoRPHine


 SULFATE INJ)  2 mg  Q1H  PRN


 IV  8/30/17 08:30


 9/13/17 08:29  8/30/17 12:05


2 MG


 


 Acetaminophen


  (Tylenol Tab)  650 mg  Q6H  PRN


 PO  8/31/17 14:00


 9/30/17 13:59 Future Hold  


 


 


 Magnesium


 Hydroxide


  (Milk Of


 Magnesia Susp)  30 ml  Q6H  PRN


 PO  8/30/17 08:30


 9/29/17 08:29   


 


 


 Bisacodyl


  (Dulcolax Supp)  10 mg  DAILY  PRN


 CA  8/30/17 08:30


 9/29/17 08:29   


 


 


 Docusate Sodium


  (coLACE CAP)  100 mg  BID


 PO  8/30/17 21:00


 9/29/17 20:59   


 


 


 Diphenhydramine


 HCl


  (Benadryl Inj)  25 mg  Q8H  PRN


 IV  8/30/17 08:30


 9/29/17 08:29   


 


 


 Al Hydrox/Mg


 Hydrox/Simethicone


  (Maalox Max Susp)  15 ml  Q4H  PRN


 PO  8/30/17 08:30


 9/29/17 08:29   


 


 


 Multivitamins


  (Multivitamin


 Tab)  1 tab  QAM


 PO  8/31/17 09:00


 9/30/17 08:59   


 


 


 Ondansetron HCl


  (Zofran Inj)  4 mg  Q6H  PRN


 IV  8/30/17 08:30


 9/29/17 08:29   


 


 


 Metoclopramide HCl


  (Reglan Inj)  10 mg  Q6H  PRN


 IV  8/30/17 08:30


 9/29/17 08:29   


 


 


 Ferrous Gluconate


  (Ferrous


 Gluconate Tab)  324 mg  TIDM


 PO  8/30/17 12:30


 9/29/17 12:29  8/30/17 12:53


324 MG


 


 Tranexamic Acid


 1000 mg/Sodium


 Chloride  110 ml @ 


 660 mls/hr  ONE  ONCE


 IV  8/30/17 14:00


 8/30/17 14:09   


 


 


 Dexamethasone


 Sodium Phosphate


 10 mg/Syringe  2.5 ml @ 1


 mls/min  ONE  ONCE


 IV  8/31/17 07:30


 8/31/17 07:32   


 


 


 Epinephrine


  (Epipen)  0.3 mg  UD  PRN


 IM  8/30/17 08:30


 9/29/17 08:29   


 


 


 Fluticasone


 Propionate


  (Flonase Nasal


 Spray)  1 sprays  DAILY


 DIEGO  8/30/17 09:00


 9/29/17 08:59   


 


 


 Hydrochlorothiazide


  (Hydrochlorothiazide


 Tab)  12.5 mg  QAM


 PO  8/30/17 09:00


 9/29/17 08:59   


 


 


 Nystatin/


 Triamcinolone


 Acetonide


  (Mycogen II Crm)  1 appln  DAILY  PRN


 EXT  8/30/17 08:30


 9/29/17 08:29   


 


 


 Timolol Maleate


  (Timoptic 0.25%


 Oph Soln)  1 drops  QAM


 OPB  8/30/17 09:00


 9/29/17 08:59   


 


 


 Miscellaneous


 Information


  (Order Awaiting


 Action)  1 ea  QS


 N/A  8/30/17 16:00


 9/29/17 15:59   


 


 


 Polyethylene


  (Miralax Powder


 Packet)  17 gm  QPM


 PO  8/30/17 21:00


 9/29/17 20:59   


 


 


 Psyllium


 Hydrophilic


 Mucilloid


  (Metamucil


 Powder)  1 pkt  QPM


 PO  8/30/17 21:00


 9/29/17 20:59   


 


 


 Miscellaneous


 Information


  (Order Awaiting


 Action)  1 ea  QS


 N/A  8/30/17 16:00


 9/29/17 15:59   


 


 


 Miscellaneous


 Information


  (Order Awaiting


 Action)  1 ea  QS


 N/A  8/30/17 16:00


 9/29/17 15:59   


 


 


 Miscellaneous


 Information


  (Order Awaiting


 Action)  1 ea  QS


 N/A  8/30/17 16:00


 9/29/17 15:59   


 


 


 Morphine Sulfate


  (MoRPHine


 SULFATE INJ)  4 mg  Q1H  PRN


 IV  8/30/17 11:15


 9/13/17 11:14   


 


 


 Cholecalciferol


  (Vitamin D Tab)  1,000


 inter.unit  QAM


 PO  8/31/17 09:00


 9/30/17 08:59   


 


 


 Acetaminophen/


 Hydrocodone Bitart


  (Norco 5/325 Tab)  1 TABLET


 FOR PAIN


 RATING...  Q6H  PRN


 PO  8/31/17 14:00


 9/14/17 13:59   


 


 


 Calcium Citrate


  (Citracal Tab)  950 mg  QPM


 PO  8/30/17 21:00


 9/29/17 20:59   


 


 


 Acetaminophen


  (Tylenol Tab)  1,000 mg  Q8H


 PO  8/30/17 16:00


 9/29/17 15:59   


 


 


 Oxycodone HCl


  (Roxicodone


 Immediate Rel Tab)   @   Q4H  PRN


 PO  8/30/17 12:57


 9/1/17 12:56  8/30/17 13:02


10 MG











Review of Systems


Constitutional:  + fatigue, No fever, No chills, No sweats


Eyes:  + diplopia


ENT:  + trouble swallowing


Respiratory:  No cough, No shortness of breath, No dyspnea on exertion


Cardiovascular:  + palpitations, No chest pain, No edema


Abdomen:  No pain, No nausea, No vomiting, No diarrhea, No constipation


Musculoskeletal:  + joint pain (Left hip), No swelling, No calf pain


Genitourinary - Female:  No dysuria, No hematuria


Neurologic:  No paralysis, No weakness


Endocrine:  No fatigue


Integumentary:  No rash, No itch





Physical Exam











  Date Time  Temp Pulse Resp B/P (MAP) Pulse Ox O2 Delivery O2 Flow Rate FiO2


 


8/30/17 11:46 36.4 86 18 145/84 (104)    


 


8/30/17 10:45 36.2 78 18 144/83 (103)    


 


8/30/17 10:15 37.3 82 18 146/84 (104)    


 


8/30/17 09:45      Nasal Cannula 2.0 


 


8/30/17 09:45 34.6 65 18 144/71 (95) 100 Nasal Cannula 2.0 


 


8/30/17 09:45      Nasal Cannula 2.0 


 


8/30/17 09:27  62 16  100   


 


8/30/17 09:27  62 16     


 


8/30/17 09:26    137/73    


 


8/30/17 09:22 36.7       


 


8/30/17 09:22  63 20     


 


8/30/17 09:22  63 20  99   


 


8/30/17 09:21    142/77    


 


8/30/17 09:18  61 19  99   


 


8/30/17 09:18  62 19     


 


8/30/17 09:17    120/73    


 


8/30/17 09:13  61 14  100   


 


8/30/17 09:13  60 14     


 


8/30/17 09:12    120/81    


 


8/30/17 09:08  62 22  100   


 


8/30/17 09:08  61 22     


 


8/30/17 09:07  62 25     


 


8/30/17 09:07  63 25 129/71 100   


 


8/30/17 09:02  57 14  99   


 


8/30/17 09:02  59 14     


 


8/30/17 09:01    125/63    


 


8/30/17 08:59  62 16  100   


 


8/30/17 08:59  61 16     


 


8/30/17 08:57    111/62    


 


8/30/17 08:54  65 22     


 


8/30/17 08:54  65 22  100   


 


8/30/17 08:53  59 17     


 


8/30/17 08:53  61 17  100   


 


8/30/17 08:52    115/67    


 


8/30/17 08:48  63 17     


 


8/30/17 08:48  62 17  99   


 


8/30/17 08:47    97/66    


 


8/30/17 08:43  63 16  100   


 


8/30/17 08:43  63 16     


 


8/30/17 08:42  67 22  100   


 


8/30/17 08:42  65 22     


 


8/30/17 08:41    123/59    


 


8/30/17 08:37  66 25  99   


 


8/30/17 08:37  66 25     


 


8/30/17 08:36    123/63    


 


8/30/17 08:33    118/57    


 


8/30/17 08:32  66 19     


 


8/30/17 08:32  66 19  100   


 


8/30/17 08:27  64 11     


 


8/30/17 08:27  72 11  99   


 


8/30/17 08:26    118/67    


 


8/30/17 08:25    87/47    


 


8/30/17 08:23    88/45    


 


8/30/17 08:22 36.0 70 16 88/45 98 Nasal Cannula 2 


 


8/30/17 08:22  72 18     


 


8/30/17 08:22  97 18  97   


 


8/30/17 05:52 36.6 58 20 153/77 100 Room Air  








General Appearance:  WD/WN, + moderate distress, + pertinent finding (severly 

aggitated)


Eyes:  PERRL, EOMI


ENT:  hearing grossly normal, pharynx normal


Neck:  supple, thyroid normal


Respiratory/Chest:  lungs clear, no respiratory distress, no accessory muscle 

use


Cardiovascular:  regular rate, rhythm, no murmur, normal peripheral pulses


Abdomen/GI:  non tender, soft, + pertinent finding (hypoactive bowel sounds)


Back:  normal inspection


Extremities/Musculoskelatal:  no calf tenderness, + pertinent finding (+ Left 

hip bandage C/D/I. )


Neurologic/Psych:  alert, normal mood/affect, oriented x 3


Skin:  normal color, warm/dry





Assessment & Plan


81 yo F with PMHx of AAA, HTN, HLD, arthritis, osteoporosis, sleep apnea, 

idiopathic angioedema, malignant breast neoplasm in 2011 s/p lumpectomy and 1 

week of XRT, remote tobacco use who presents for a Left total hip arthroplasty 

by Dr. Bernard on 8/30/17. 





Left Hip arthroplasty


- Pain regimen changed as the patient takes Tylenol #3 at home


- Morphine 2 and 4 mg not touching pain at all, so will change this to Dilaudid 

0.5 mg Q1H and 1.0 mg Q2H.  Added oxycodone 5-10 mg Q4H prn for next 2 days 

along with tylenol 1000mg PO Q8H scheduled. Norco to start tomorrow prn. Pt can 

resume Tylenol #3 at time of discharge. 


- Bowel regimen with dulcolax BID schedule, miralax daily scheduled, and 

dulcolax suppository and MOM prn


- DVT ppx per primary team


- PT/OT on board





Infrarenal AAA


- Measures 4.6x4.7 cm, pt follows with [facility] vascular surgery 

every 6 months.  She was most recently seen in May 2017 and aneurysm was 

unchanged from previously.  No surgery currently indicated





HTN


- Continue HCTZ 12.5 mg QAM


- Will order hydralazine  prn 


- Follow with Dr. Gross as an outpatient





HLD


- Not currently on statin therapy





Osteoporosis


- resume supplementation with Vit D 1000 U daily QAM





Sleep apnea


- Does not wear O2 at baseline, may use her own CPAP machine here. Will ask 

respiratory to set up inhouse CPAP if her family cannot bring it in. 





CODE STATUS: FULL CODE





Disposition: PT/OT, planning for [facility] tomorrow, CM to assist with 

discharge planning. 





Medical team will continue to follow along, thank you. 





PA Physician Supervision Note:





I interviewed and examined the patient. Discussed with Tram Griffiths PAC 

and agree with findings and plan as documented in the note. Any exceptions or 

clarifications are listed here: None





Patient is status post left hip arthroplasty doing well postoperatively 

although having some initial problems with pain control, her pain is in good 

control, I visited her and she has no other complaints





Vital signs are stable noted





Her heart is regular her lungs are clear her abdomen is normoactive bowel 

sounds and soft





Patient status post left hip arthroplasty, pain control is to be optimized we'

ve made adjustments to her regime, the patient will use CPAP for sleep apnea, 

her hypertension is only requiring a mild dose of hydrochlorothiazide will 

evaluate in the morning given her fluid losses whether this will be continued 

or not hydralazine is ordered for backup given the fact that she is guided 

infrarenal abdominal aortic aneurysm this however has been stable of late





Documented By:  Marcos Vicente

## 2017-08-30 NOTE — DIAGNOSTIC IMAGING REPORT
PELVIS 1 OR 2 VIEW ROUTINE



HISTORY:  82 years-old Female s/p Left hip total hip arthroplasty. Postoperative

exam



COMPARISON: Pelvis and hip radiographs 3/18/2017



TECHNIQUE: Single AP view of the pelvis



FINDINGS: 

There has been interval left total hip arthroplasty surgery with skin staples

overlying the surgical site. Expected postsurgical swelling and soft tissue air

is seen about the left hip. There is no periprosthetic fracture or malalignment.

Vascular calcifications are noted.



Severe right hip osteoarthritis is also noted with background osteopenia.



IMPRESSION: Status post total left hip arthroplasty without complication. 







The above report was generated using voice recognition software. It may contain

grammatical, syntax or spelling errors.







Electronically signed by:  Santos Finley M.D.

8/30/2017 9:02 AM



Dictated Date/Time:  8/30/2017 8:59 AM

## 2017-08-31 VITALS
HEART RATE: 63 BPM | DIASTOLIC BLOOD PRESSURE: 61 MMHG | TEMPERATURE: 97.88 F | OXYGEN SATURATION: 97 % | SYSTOLIC BLOOD PRESSURE: 96 MMHG

## 2017-08-31 VITALS
DIASTOLIC BLOOD PRESSURE: 63 MMHG | HEART RATE: 71 BPM | SYSTOLIC BLOOD PRESSURE: 103 MMHG | TEMPERATURE: 98.24 F | OXYGEN SATURATION: 96 %

## 2017-08-31 VITALS
DIASTOLIC BLOOD PRESSURE: 61 MMHG | TEMPERATURE: 97.88 F | HEART RATE: 63 BPM | SYSTOLIC BLOOD PRESSURE: 96 MMHG | OXYGEN SATURATION: 97 %

## 2017-08-31 LAB
ANION GAP SERPL CALC-SCNC: 7 MMOL/L (ref 3–11)
BASOPHILS # BLD: 0.01 K/UL (ref 0–0.2)
BASOPHILS NFR BLD: 0.1 %
BUN SERPL-MCNC: 13 MG/DL (ref 7–18)
BUN/CREAT SERPL: 14.8 (ref 10–20)
CALCIUM SERPL-MCNC: 8.2 MG/DL (ref 8.5–10.1)
CHLORIDE SERPL-SCNC: 99 MMOL/L (ref 98–107)
CO2 SERPL-SCNC: 26 MMOL/L (ref 21–32)
COMPLETE: YES
CREAT CL PREDICTED SERPL C-G-VRATE: 55 ML/MIN
CREAT SERPL-MCNC: 0.85 MG/DL (ref 0.6–1.2)
EOSINOPHIL NFR BLD AUTO: 223 K/UL (ref 130–400)
GLUCOSE SERPL-MCNC: 133 MG/DL (ref 70–99)
HCT VFR BLD CALC: 33.5 % (ref 37–47)
IG%: 0.3 %
IMM GRANULOCYTES NFR BLD AUTO: 11 %
INR PPP: 1 (ref 0.9–1.1)
LYMPHOCYTES # BLD: 1.1 K/UL (ref 1.2–3.4)
MCH RBC QN AUTO: 31.1 PG (ref 25–34)
MCHC RBC AUTO-ENTMCNC: 34 G/DL (ref 32–36)
MCV RBC AUTO: 91.5 FL (ref 80–100)
MONOCYTES NFR BLD: 7.6 %
NEUTROPHILS # BLD AUTO: 0.1 %
NEUTROPHILS NFR BLD AUTO: 80.9 %
PMV BLD AUTO: 9.3 FL (ref 7.4–10.4)
POTASSIUM SERPL-SCNC: 3.7 MMOL/L (ref 3.5–5.1)
PROTHROMBIN TIME: 10.8 SECONDS (ref 9–12)
RBC # BLD AUTO: 3.66 M/UL (ref 4.2–5.4)
SODIUM SERPL-SCNC: 132 MMOL/L (ref 136–145)
WBC # BLD AUTO: 10.03 K/UL (ref 4.8–10.8)

## 2017-08-31 RX ADMIN — TIMOLOL MALEATE SCH DROPS: 5 SOLUTION OPHTHALMIC at 08:35

## 2017-08-31 RX ADMIN — ACETAMINOPHEN SCH MG: 500 TABLET, COATED ORAL at 08:33

## 2017-08-31 RX ADMIN — OXYCODONE HYDROCHLORIDE PRN MG: 5 TABLET ORAL at 13:09

## 2017-08-31 RX ADMIN — HYDROCHLOROTHIAZIDE SCH MG: 25 TABLET ORAL at 08:34

## 2017-08-31 RX ADMIN — FERROUS GLUCONATE SCH MG: 324 TABLET ORAL at 12:30

## 2017-08-31 RX ADMIN — DOCUSATE SODIUM SCH MG: 100 CAPSULE, LIQUID FILLED ORAL at 08:35

## 2017-08-31 RX ADMIN — ALUMINUM ZIRCONIUM TRICHLOROHYDREX GLY SCH EA: 0.2 STICK TOPICAL at 08:30

## 2017-08-31 RX ADMIN — FLUTICASONE PROPIONATE SCH SPRAYS: 50 SPRAY, METERED NASAL at 08:31

## 2017-08-31 RX ADMIN — OXYCODONE HYDROCHLORIDE PRN MG: 5 TABLET ORAL at 08:38

## 2017-08-31 RX ADMIN — FERROUS GLUCONATE SCH MG: 324 TABLET ORAL at 08:34

## 2017-08-31 RX ADMIN — ALUMINUM ZIRCONIUM TRICHLOROHYDREX GLY SCH EA: 0.2 STICK TOPICAL at 08:00

## 2017-08-31 NOTE — ORTHOPEDIC PROGRESS NOTE
Orthopedic Progress Note


Date of Service


Aug 31, 2017.





Subjective


Post OP Day:  1


Reports: feeling well, complaints (of pain with leg movement), Denies: chest 

pain, SOB, nausea / vomiting, light headedness, calf pain





Objective


calves soft nontender, N/V intact, hip located, capillary refill less than 2 

sec., dressing C/D/I, incision C/D/I, A&O x3, toes mobile, CMS intact


minimal drainage on bandages, wound looks very good











  Date Time  Temp Pulse Resp B/P (MAP) Pulse Ox O2 Delivery O2 Flow Rate FiO2


 


8/31/17 07:10 36.6 63 16 96/61 (73) 97 Room Air  


 


8/31/17 04:00 36.8 71 18 103/63 (76) 96 Room Air  


 


8/31/17 00:00      Room Air  


 


8/30/17 23:59 36.9 75 16 108/66 (80) 97 Room Air  


 


8/30/17 19:27 36.9 87 17 115/70 (85) 95 Room Air  


 


8/30/17 15:40      Room Air  


 


8/30/17 15:37 37.4 78 16 162/84 (110) 98 Room Air  


 


8/30/17 12:45  83 18 160/91 (114) 96 Room Air  


 


8/30/17 11:46 36.4 86 18 145/84 (104)    


 


8/30/17 10:45 36.2 78 18 144/83 (103)    


 


8/30/17 10:15 37.3 82 18 146/84 (104)    


 


8/30/17 09:45      Nasal Cannula 2.0 


 


8/30/17 09:45 34.6 65 18 144/71 (95) 100 Nasal Cannula 2.0 


 


8/30/17 09:45      Nasal Cannula 2.0 


 


8/30/17 09:27  62 16  100   


 


8/30/17 09:27  62 16     


 


8/30/17 09:26    137/73    


 


8/30/17 09:22 36.7       


 


8/30/17 09:22  63 20     


 


8/30/17 09:22  63 20  99   


 


8/30/17 09:21    142/77    


 


8/30/17 09:18  61 19  99   


 


8/30/17 09:18  62 19     


 


8/30/17 09:17    120/73    


 


8/30/17 09:13  61 14  100   


 


8/30/17 09:13  60 14     


 


8/30/17 09:12    120/81    


 


8/30/17 09:08  62 22  100   


 


8/30/17 09:08  61 22     


 


8/30/17 09:07  62 25     


 


8/30/17 09:07  63 25 129/71 100   


 


8/30/17 09:02  57 14  99   


 


8/30/17 09:02  59 14     


 


8/30/17 09:01    125/63    


 


8/30/17 08:59  62 16  100   


 


8/30/17 08:59  61 16     


 


8/30/17 08:57    111/62    


 


8/30/17 08:54  65 22     


 


8/30/17 08:54  65 22  100   


 


8/30/17 08:53  59 17     


 


8/30/17 08:53  61 17  100   


 


8/30/17 08:52    115/67    


 


8/30/17 08:48  63 17     


 


8/30/17 08:48  62 17  99   


 


8/30/17 08:47    97/66    


 


8/30/17 08:43  63 16  100   


 


8/30/17 08:43  63 16     


 


8/30/17 08:42  67 22  100   


 


8/30/17 08:42  65 22     


 


8/30/17 08:41    123/59    


 


8/30/17 08:37  66 25  99   


 


8/30/17 08:37  66 25     


 


8/30/17 08:36    123/63    


 


8/30/17 08:33    118/57    


 


8/30/17 08:32  66 19     


 


8/30/17 08:32  66 19  100   


 


8/30/17 08:27  64 11     


 


8/30/17 08:27  72 11  99   


 


8/30/17 08:26    118/67    


 


8/30/17 08:25    87/47    


 


8/30/17 08:23    88/45    








Laboratory Results 24 Hours:











Test


  8/30/17


14:56 8/31/17


05:07


 


Hematocrit 37.5 %  33.5 % 


 


Hemoglobin 13.2 g/dL  11.4 g/dL 


 


Prothromb Time International


Ratio 1.0 


  1.0 


 


 


Prothrombin Time 10.7 SECONDS  10.8 SECONDS 


 


White Blood Count  10.03 K/uL 


 


Red Blood Count  3.66 M/uL 


 


Mean Corpuscular Volume  91.5 fL 


 


Mean Corpuscular Hemoglobin  31.1 pg 


 


Mean Corpuscular Hemoglobin


Concent 


  34.0 g/dl 


 


 


Platelet Count  223 K/uL 


 


Mean Platelet Volume  9.3 fL 


 


Neutrophils (%) (Auto)  80.9 % 


 


Lymphocytes (%) (Auto)  11.0 % 


 


Monocytes (%) (Auto)  7.6 % 


 


Eosinophils (%) (Auto)  0.1 % 


 


Basophils (%) (Auto)  0.1 % 


 


Neutrophils # (Auto)  8.12 K/uL 


 


Lymphocytes # (Auto)  1.10 K/uL 


 


Monocytes # (Auto)  0.76 K/uL 


 


Eosinophils # (Auto)  0.01 K/uL 


 


Basophils # (Auto)  0.01 K/uL 











Assessment & Plan


Assessment:


Left hip 1 day post op total hip arthroplasty


Plan:


PT/OT today, WBAT


coumadin per nomogram


dressing changed by me, wound looks good


denied [facility], awaiting placement at SNF


continue total hip precautions








Discharge Planning


Discharge Planning:  skilled nursing facility


DVT Prophylaxis:  TEDs, SCDs, Coumadin


Therapy:  Physical Therapy, Occupational Therapy

## 2017-08-31 NOTE — HOSPITALIST PROGRESS NOTE
Hospitalist Progress Note


Date of Service


Aug 31, 2017.





Subjective


Pt evaluation today including:  conversation w/ patient, physical exam, chart 

review, lab review, review of studies


Pain:  L hip, mild


PO Intake:  Good


Voiding:  no voiding problems


The patient was seen and examined this morning. She is in better spirits today.

  Overnight had some issues with nursing staff and felt she was being mistreated

/abused.  She fears at this time she has been "black-listed" and wont be 

allowed back into the hospital due to her outrageous behavior, anger, and 

accusatory nature overnight. She admits to being overreactive and is hoping 

that she can come here for future surgical procedures as she states needs her R 

hip and R knee.  She reports her pain is better controlled today.  She was up 

with PT walking today and walked the halls.  She notes had an episode of 

gagging on overcooked jaimes and is asking for mucinex.





   Constitutional:  No fever, No chills, No sweats


   Eyes:  No redness, No diplopia


   ENT:  No nasal symptoms, No trouble swallowing


   Respiratory:  No cough, No shortness of breath


   Cardiovascular:  No chest pain, No palpitations


   Abdomen:  No pain, No nausea, No vomiting, No diarrhea, No constipation


   Musculoskeletal:  + joint pain (L hip), No muscle pain


   Female :  No dysuria


   Endo:  No fatigue


   Skin:  No rash, No itch





Objective


Vital Signs











  Date Time  Temp Pulse Resp B/P (MAP) Pulse Ox O2 Delivery O2 Flow Rate FiO2


 


8/31/17 07:10 36.6 63 16 96/61 (73) 97 Room Air  


 


8/31/17 04:00 36.8 71 18 103/63 (76) 96 Room Air  


 


8/31/17 00:00      Room Air  


 


8/30/17 23:59 36.9 75 16 108/66 (80) 97 Room Air  


 


8/30/17 19:27 36.9 87 17 115/70 (85) 95 Room Air  


 


8/30/17 15:40      Room Air  


 


8/30/17 15:37 37.4 78 16 162/84 (110) 98 Room Air  


 


8/30/17 12:45  83 18 160/91 (114) 96 Room Air  











Physical Exam


General Appearance:  WD/WN, no apparent distress


Eyes:  PERRL, EOMI


ENT:  hearing grossly normal, pharynx normal


Neck:  supple, no JVD


Respiratory/Chest:  lungs clear, no respiratory distress, no accessory muscle 

use


Cardiovascular:  regular rate, rhythm, no murmur


Abdomen:  normal bowel sounds, non tender, soft


Extremities:  non-tender, no pedal edema, + pertinent finding (Left hip bandage 

is C/D/I, ace bandage in place, ice pack on)


Neurologic/Psychiatric:  alert, oriented x 3


Skin:  normal color, warm/dry





Laboratory Results





Last 24 Hours








Test


  8/30/17


14:56 8/31/17


05:07


 


White Blood Count 10.46 K/uL  10.03 K/uL 


 


Red Blood Count 4.16 M/uL  3.66 M/uL 


 


Hemoglobin 13.2 g/dL  11.4 g/dL 


 


Hematocrit 37.5 %  33.5 % 


 


Mean Corpuscular Volume 90.1 fL  91.5 fL 


 


Mean Corpuscular Hemoglobin 31.7 pg  31.1 pg 


 


Mean Corpuscular Hemoglobin


Concent 35.2 g/dl 


  34.0 g/dl 


 


 


RDW Standard Deviation 42.1 fL  43.3 fL 


 


RDW Coefficient of Variation 12.8 %  12.9 % 


 


Platelet Count 228 K/uL  223 K/uL 


 


Mean Platelet Volume 9.2 fL  9.3 fL 


 


Prothrombin Time 10.7 SECONDS  10.8 SECONDS 


 


Prothromb Time International


Ratio 1.0 


  1.0 


 


 


Neutrophils (%) (Auto)  80.9 % 


 


Lymphocytes (%) (Auto)  11.0 % 


 


Monocytes (%) (Auto)  7.6 % 


 


Eosinophils (%) (Auto)  0.1 % 


 


Basophils (%) (Auto)  0.1 % 


 


Neutrophils # (Auto)  8.12 K/uL 


 


Lymphocytes # (Auto)  1.10 K/uL 


 


Monocytes # (Auto)  0.76 K/uL 


 


Eosinophils # (Auto)  0.01 K/uL 


 


Basophils # (Auto)  0.01 K/uL 


 


Immature Granulocyte % (Auto)  0.3 % 


 


Immature Granulocyte # (Auto)  0.03 K/uL 


 


Sodium Level  132 mmol/L 


 


Potassium Level  3.7 mmol/L 


 


Chloride Level  99 mmol/L 


 


Carbon Dioxide Level  26 mmol/L 


 


Anion Gap  7.0 mmol/L 


 


Blood Urea Nitrogen  13 mg/dl 


 


Creatinine  0.85 mg/dl 


 


Est Creatinine Clear Calc


Drug Dose 


  55.0 ml/min 


 


 


Estimated GFR (


American) 


  74.5 


 


 


Estimated GFR (Non-


American 


  64.3 


 


 


BUN/Creatinine Ratio  14.8 


 


Random Glucose  133 mg/dl 


 


Calcium Level  8.2 mg/dl 











Assessment and Plan


83 yo F with PMHx of AAA, HTN, HLD, arthritis, osteoporosis, sleep apnea, 

idiopathic angioedema, malignant breast neoplasm in 2011 s/p lumpectomy and 1 

week of XRT, remote tobacco use who presents for a Left total hip arthroplasty 

by Dr. Bernard on 8/30/17. 





Left Hip arthroplasty


- Ortho as the primary service


- Continue Dilaudid 0.5 mg Q1H and 1.0 mg Q2H.  Continue oxycodone 5-10 mg Q4H 

prn along with tylenol 1000mg PO Q8H scheduled. Norco to start today per ortho 

prn.  Pt can resume Tylenol #3 at time of discharge. 


- Bowel regimen with dulcolax BID schedule, miralax daily scheduled, and 

dulcolax suppository and MOM prn


- DVT ppx per primary team


- PT/OT on board





Infrarenal AAA


- Measures 4.6x4.7 cm, pt follows with [facility] vascular surgery 

every 6 months.  She was most recently seen in May 2017 and aneurysm was 

unchanged from previously.  No surgery currently indicated





Nasal Congestion


- Will order mucinex Q12h, sudafed Q6H prn, and change flonase to QPM per pt 

request. 





HTN


- Continue HCTZ 12.5 mg QAM


- BP is stable





HLD


- Not currently on statin therapy





Osteoporosis


- resume supplementation with Vit D 1000 U daily QAM





Sleep apnea


- Does not wear O2 at baseline, may use her own CPAP machine here. Will ask 

respiratory to set up inhouse CPAP if her family cannot bring it in. 





CODE STATUS: FULL CODE





Disposition: PT/OT, planning rehab vs SNF today, awaiting authorization, CM to 

assist with discharge planning. 





Medical team will continue to follow along, thank you.

## 2017-08-31 NOTE — PROGRESS NOTE
DATE: 08/31/2017

 

Postop check.  Postop day #1 status post left total hip replacement.  The

patient is doing well, is sitting up in the potty chair, is moving well. 

Pain is well managed.

 

Denies chest pain, shortness of breath, fever, chills, nausea, vomiting or

headache.

 

Vital signs are stable.  She is afebrile.  Abdomen soft, nontender. 

Neurovascular check of both lower extremities within normal limits. 

Ambulates well.

 

Hematocrit stable.  Electrolytes are good.  INR is 1.0.

 

ASSESSMENT:  Doing well.  Continue with postop care pathway.  Is stable

enough for transfer today to [facility] if bed

available,  to assess.

## 2017-08-31 NOTE — ANESTHESIOLOGY PROGRESS NOTE
Anesthesia Post Op Note


Date & Time


Aug 31, 2017 at 12:54





Vital Signs


Pain Intensity:  5.0





Vital Signs Past 12 Hours








  Date Time  Temp Pulse Resp B/P (MAP) Pulse Ox O2 Delivery O2 Flow Rate FiO2


 


8/31/17 08:10      Room Air  


 


8/31/17 07:10 36.6 63 16 96/61 (73) 97 Room Air  


 


8/31/17 04:00 36.8 71 18 103/63 (76) 96 Room Air  











Notes


Mental Status:  alert / awake / arousable, participated in evaluation


Anesthetic Complications:  no major complications apparent

## 2017-08-31 NOTE — DISCHARGE INSTRUCTIONS
Discharge Instructions


Date of Service


Aug 30, 2017.





Admission


Reason for Admission:  Left Hip Degenerative Joint Disease





Discharge


Discharge Diagnosis / Problem:  Left hip s/p total hip replacement





Discharge Goals


Goal(s):  Decrease discomfort, Improve function, Increase independence





Activity Recommendations


Activity Level:  Up Ad [place]


Therapies:  Physical Therapy, Weight Bearing Status (as tolerated), 

Occupational Therapy


Weightbearing Status:  Left weightbearing (as tolerated)


Lifting Limitations:  gradually increase as tolerated


Exercise/Sports Limitations:  until after follow-up appointment


Shower/Bathe:  keep incision dry





.





Additional Information


Patient informed of condition:  Yes


Advance Directives:  Yes


DNR:  No


Level of Care:  Skilled


Communicable Disease:  No


Prognosis:  Stable


Brantley Catheter:  No





Instructions / Follow-Up


Instructions / Follow-Up





New Medicine:  





*  You will likely be taking one or more of these medicines:


   1.  Percocet - Take, as directed, when you need it, every


        four to six hours to control your pain.


   2.  Coumadin - Thins your blood to lessen the chance of forming a


        blood clot.  The dose of this is different for each person and is based


        on your blood tests that are done twice a week.





*  The most common side effects of pain medicine and iron are nausea and 

constipation.


    If nausea or constipation is too much of a problem or if you have any 

questions about


    your new medicines or doses, call [facility] at [phone].  

We


    will try to help you manage these issues.








VERY IMPORTANT TO READ AND REVIEW"





Blood Clots and Blood Thinning Medicine:





*  You are given Coumadin during the immediate post-operative period to lessen 

the risk of


    blood clots forming in your legs and/or lungs.  Coumadin is usually given 

for six weeks after


    surgery.


*  The prescription is for 2 mg tablets.  At discharge, you should understand 

your dose and 


    take it all at the same time every day, preferably after dinner. 


*  You need to get your blood checked 1 - 2 times per week for six weeks, or as 

directed.  


*  If your dose needs to change, we will call you. Do not take your medication 

on the day of the blood 


    test until we call you.


*  If you don't hear from us after your blood draws, keep taking the same dose.





Pain:





*  The immediate post-operative period after hip replacement surgery is often 

quite painful.


*  You are given a prescription for pain medicine.  You should take it, as 

directed, when you 


    need it, especially before physical therapy and before going to bed.  Pain 

that interferes


    with sleep is very common and can last several months.


*  You will likely need pain medicine for the first two to four weeks.  It will 

not stop all of the


    pain.  The pain will lessen and as you feel better, you may change to 

milder pain medicine


    such as Tylenol.  


*  The most common side effects of pain medicine are nausea and constipation, 

so don't take


    more than you need.





Physical Therapy:


 


*  Follow the "Hip Precautions Instructions."  


*  In some cases, the  at the hospital will arrange to have a 

therapist 


    come to your house for the first couple of weeks to help you learn these 

skills.


*  You need to practice on your own or with the help of a family member as 

needed.


*  When you learn these skills, most of the therapy can be done on your own.





Home Exercise:





*  You were shown a series of exercises in the hospital.  Do these exercises 

three to 


    four times each day including the exercises you were shown in physical 

therapy.





Walking:





*  Get up and walk several times each day.  For the first four weeks, try not 

to stand or


    walk for more than one hour at a time.  If you do stand or walk for more 

than one hour,


    you will not hurt anything, but your leg will likely swell.  


*  As you feel comfortable, you may change from the walker or crutches to a 

cane and 


    then to independent walking.








SELF CARE INSTRUCTIONS AFTER TOTAL HIP REPLACEMENT





Until the incision and soft tissues around your hip have healed, there is


a possibility that the hip prosthesis could dislocate.





A.  Observe the following precautions to prevent dislocation:


   1.  Don't bend your hip greater than 90 degrees.


   2.  Avoid crossing your legs or ankles while standing or lying.


   3.  Sit with your feet placed 6 inches apart.


   4.  When sitting, keep your knees below your hips.  Sit on a firm 


        surface, avoid deep, soft chairs and couches.  Use an elevated


        toilet seat in the bathroom.


   5.  Don't bend over at the waist.  Use a long handled shoehorn and


        a sock aid to help you put on your shoes and socks.  A reacher


        can help you  objects that are too high or too low to reach.


   6.  Keep car riding to a minimum for at least one month after surgery.





B.  Your balance may be shaky for a while.  Use crutches or a walker until 


     directed by your doctor.





C.  Use hand rails when walking on stairs.





D.  Wear low heeled shoes with non-slip soles.





E.  Be sure that your floors are free of things that could trip you - throw rugs

,


     electrical cords, small objects.  Avoid wet and waxed floors, especially 

with


     crutches and canes.





F.  Try to walk several times a day with rest periods between.





G.  Continue with all the exercises taught to you in the hospital.  Again, make 


     walking a part of your daily routine.





**VERY IMPORTANT TO READ AND REVIEW**





A.  Take Coumadin, or Lovenox (blood thinning medications) as directed by your


     doctor.  If you are on Coumadin, have a pro-time (blood test) drawn 

according 


     to your doctor's instructions.  This will tell the doctor how well the 

Coumadin is


     thinning your blood.





B.  There are a few signs you need to watch for after you are home.  If you 

notice


     any of the followin.  Increased severe hip pain.  Some pain is expected especially


              when you exercise.


   2.  Increased swelling in your leg or knee; pain or swelling of the


              calf muscle in either lower leg.


   3.  Any fluid drainage from the incision.


   4.  Shortness of breath or chest pain.





TEDs/Elastic Stockings:





*  The white elastic stockings help limit swelling and prevent blood clots from


    forming in your legs.  The more you wear them, the more they work.


*  Wear them for six weeks.





Prevention of Infection:





*  Take antibiotics one hour before any dental cleaning, dental work, urological


    procedure, gastrointestinal procedure or any invasive surgery in order to


    prevent your new joint from getting infected.  


*  You may get the antibiotics from the doctor performing the procedure or we 

will


    call in a prescription to the pharmacy of your choice. Call the office for 

a 


    prescription at least 2 days prior to your appointment. 





Things to Watch For:





*  Drainage from the incision site that occurs more than one week after your 

surgery.


*  Severely increased leg pain or swelling.


*  Increased redness at the incision site.


*  Fever above 101 degrees Fahrenheit.


*  Unusual chest pain or shortness of breath.


*  Unusual pain or burning with urination.





Current Hospital Diet


Patient's current hospital diet: Regular Diet





Discharge Diet


Recommended Diet:  Regular Diet





Procedures


Procedures Performed:  


Left Total Hip Arthroplasty Uncemented





Pending Studies


Studies pending at discharge:  no





Physician Orders On Transfer


Dressing Changes:


as needed for soiling


Vital Signs:


per facility routine


Weigh:


per facility routine


POLST Discussion:  Not Applicable





Laboratory Results





Hemoglobin A1c








Test


  17


20:45 Range/Units


 


 


Estimated Average Glucose 114   mg/dl


 


Hemoglobin A1c 5.6  4.5-5.6  %








Lipid Panel








Test


  17


05:31 Range/Units


 


 


Triglycerides Level 149  0-150  mg/dl


 


Cholesterol Level 234 H 0-200  mg/dl


 


HDL Cholesterol 52   mg/dl


 


Cholesterol/HDL Ratio 4.5   


 


LDL Cholesterol, Calculated 152   mg/dl











Medical Emergencies








.


Who to Call and When:





Medical Emergencies:  If at any time you feel your situation is an emergency, 

please call 911 immediately.





.





Non-Emergent Contact


Non-Emergency issues call your:  Primary Care Provider, Surgeon


Call Non-Emergent contact if:  temperature is above 101, wound has increased 

drainage, wound has increased redness, wound has increased pain, you have any 

medication questions





.


.








"Provider Documentation" section prepared by Aaron Coy PA-C.








.





Core Measure Problem


Core Measures:  None





[place] Drug Monitoring Program


Search Results:  no issues identified

## 2017-09-01 ENCOUNTER — HOSPITAL ENCOUNTER (OUTPATIENT)
Dept: HOSPITAL 45 - C.LABVPSUA | Age: 82
Discharge: NURSING, REHAB OR LONG-TERM CARE | End: 2017-09-01
Attending: INTERNAL MEDICINE
Payer: COMMERCIAL

## 2017-09-01 DIAGNOSIS — Z98.890: Primary | ICD-10-CM

## 2017-09-01 LAB
INR PPP: 1 (ref 0.9–1.1)
PROTHROMBIN TIME: 10.9 SECONDS (ref 9–12)

## 2017-09-05 ENCOUNTER — HOSPITAL ENCOUNTER (OUTPATIENT)
Dept: HOSPITAL 45 - C.LABVPSUA | Age: 82
Discharge: NURSING, REHAB OR LONG-TERM CARE | End: 2017-09-05
Attending: INTERNAL MEDICINE
Payer: COMMERCIAL

## 2017-09-05 DIAGNOSIS — Z98.890: Primary | ICD-10-CM

## 2017-09-05 LAB
INR PPP: 1.4 (ref 0.9–1.1)
PROTHROMBIN TIME: 14.7 SECONDS (ref 9–12)

## 2017-09-07 ENCOUNTER — HOSPITAL ENCOUNTER (OUTPATIENT)
Dept: HOSPITAL 45 - C.LABVPSUA | Age: 82
Discharge: HOME | End: 2017-09-07
Attending: INTERNAL MEDICINE
Payer: COMMERCIAL

## 2017-09-07 DIAGNOSIS — Z96.642: Primary | ICD-10-CM

## 2017-09-07 LAB
INR PPP: 1.4 (ref 0.9–1.1)
PROTHROMBIN TIME: 15.7 SECONDS (ref 9–12)

## 2017-09-11 ENCOUNTER — HOSPITAL ENCOUNTER (OUTPATIENT)
Dept: HOSPITAL 45 - C.LABVPSUA | Age: 82
Discharge: NURSING, REHAB OR LONG-TERM CARE | End: 2017-09-11
Attending: INTERNAL MEDICINE
Payer: COMMERCIAL

## 2017-09-11 DIAGNOSIS — Z98.890: Primary | ICD-10-CM

## 2017-09-11 LAB
INR PPP: 2 (ref 0.9–1.1)
PROTHROMBIN TIME: 22.5 SECONDS (ref 9–12)

## 2017-09-18 ENCOUNTER — HOSPITAL ENCOUNTER (OUTPATIENT)
Dept: HOSPITAL 45 - C.LABVPSUA | Age: 82
Discharge: HOME | End: 2017-09-18
Attending: INTERNAL MEDICINE
Payer: COMMERCIAL

## 2017-09-18 DIAGNOSIS — Z51.81: Primary | ICD-10-CM

## 2017-09-18 DIAGNOSIS — Z79.01: ICD-10-CM

## 2017-09-18 LAB
INR PPP: 2.5 (ref 0.9–1.1)
PROTHROMBIN TIME: 28 SECONDS (ref 9–12)

## 2017-10-05 ENCOUNTER — HOSPITAL ENCOUNTER (OUTPATIENT)
Dept: HOSPITAL 45 - C.LABSPEC | Age: 82
Discharge: HOME | End: 2017-10-05
Attending: ORTHOPAEDIC SURGERY
Payer: COMMERCIAL

## 2017-10-05 DIAGNOSIS — Z79.01: ICD-10-CM

## 2017-10-05 DIAGNOSIS — Z51.81: Primary | ICD-10-CM

## 2017-10-05 LAB
INR PPP: 1 (ref 0.9–1.1)
PROTHROMBIN TIME: 10.5 SECONDS (ref 9–12)

## 2017-10-09 NOTE — CODING QUERY NO DIAGNOSIS
:  1935



PLEASE PROVIDE SIGNED PHYSICIAN ORDER WITH DIAGNOSES



To promote full compliance with coding requirements relating to patient care, physician 
participation is requested in all cases of  uncertainty.  Please assist us with 
providing a copy of the original, signed physician order including diagnoses for the 
following services that were rendered on 10/5/17:



PROTHROMBIN TIME PROFILE



If you are unable to provide a copy of the order please have Dr. Nascimento document 
the diagnosis on this letter and sign/date.





Thank you  

Elaine PerezNorthwest Medical Cent[facility]ud[facility]

[facility] Information Management

Phone:  [phone]

Fax:  [phone]



Once completed, please kindly fax back to [phone]



For questions please call [phone]

## 2017-10-23 ENCOUNTER — HOSPITAL ENCOUNTER (OUTPATIENT)
Dept: HOSPITAL 45 - C.RDSM | Age: 82
Discharge: HOME | End: 2017-10-23
Attending: ORTHOPAEDIC SURGERY
Payer: COMMERCIAL

## 2017-10-23 DIAGNOSIS — Z96.642: Primary | ICD-10-CM

## 2017-12-04 ENCOUNTER — HOSPITAL ENCOUNTER (OUTPATIENT)
Dept: HOSPITAL 45 - C.LABSPEC | Age: 82
Discharge: HOME | End: 2017-12-04
Attending: ORTHOPAEDIC SURGERY
Payer: COMMERCIAL

## 2017-12-04 DIAGNOSIS — K59.00: ICD-10-CM

## 2017-12-04 DIAGNOSIS — Z96.642: ICD-10-CM

## 2017-12-04 DIAGNOSIS — Z51.81: ICD-10-CM

## 2017-12-04 DIAGNOSIS — Z79.01: ICD-10-CM

## 2017-12-04 DIAGNOSIS — E78.00: Primary | ICD-10-CM

## 2017-12-04 LAB — INR PPP: 1 (ref 0.9–1.1)

## 2017-12-05 NOTE — CODING QUERY NO DIAGNOSIS
***Valid Physician Order Needed***



A valid physician order must be submitted in order to properly bill for the service(s) 
provided, including date of service(s), valid diagnosis, and physician signature. If these 
tests are done on a recurring basis the original physican order must be submitted in order 
to code and bill for the service(s) provided.



****Please fax us the original, signed physician order so that we may expedite billing to 
[phone]



DOS 12/4/17

* PT/INR





Thank you  

[facility] Information Management

Phone:  [phone]

Fax:  [phone]

## 2017-12-07 ENCOUNTER — HOSPITAL ENCOUNTER (OUTPATIENT)
Dept: HOSPITAL 45 - C.LABSPEC | Age: 82
Discharge: HOME | End: 2017-12-07
Attending: ORTHOPAEDIC SURGERY
Payer: COMMERCIAL

## 2017-12-07 DIAGNOSIS — Z79.01: ICD-10-CM

## 2017-12-07 DIAGNOSIS — Z96.649: Primary | ICD-10-CM

## 2017-12-07 LAB
INR PPP: 1.2 (ref 0.9–1.1)
PROTHROMBIN TIME: 12.6 SECONDS (ref 9–12)

## 2017-12-11 ENCOUNTER — HOSPITAL ENCOUNTER (OUTPATIENT)
Dept: HOSPITAL 45 - C.LAB1850 | Age: 82
Discharge: HOME | End: 2017-12-11
Attending: ORTHOPAEDIC SURGERY
Payer: COMMERCIAL

## 2017-12-11 DIAGNOSIS — Z96.649: ICD-10-CM

## 2017-12-11 DIAGNOSIS — M16.0: Primary | ICD-10-CM

## 2017-12-11 LAB
INR PPP: 1.5 (ref 0.9–1.1)
PROTHROMBIN TIME: 15.4 SECONDS (ref 9–12)

## 2017-12-14 ENCOUNTER — HOSPITAL ENCOUNTER (OUTPATIENT)
Dept: HOSPITAL 45 - C.LABSPEC | Age: 82
Discharge: HOME | End: 2017-12-14
Attending: ORTHOPAEDIC SURGERY
Payer: COMMERCIAL

## 2017-12-14 DIAGNOSIS — I10: ICD-10-CM

## 2017-12-14 DIAGNOSIS — Z47.1: Primary | ICD-10-CM

## 2017-12-14 DIAGNOSIS — Z96.641: ICD-10-CM

## 2017-12-14 LAB — INR PPP: 1.4 (ref 0.9–1.1)

## 2017-12-15 ENCOUNTER — HOSPITAL ENCOUNTER (OUTPATIENT)
Dept: HOSPITAL 45 - C.LABSPEC | Age: 82
Discharge: HOME | End: 2017-12-15
Attending: INTERNAL MEDICINE
Payer: COMMERCIAL

## 2017-12-15 DIAGNOSIS — R19.7: Primary | ICD-10-CM

## 2017-12-21 ENCOUNTER — HOSPITAL ENCOUNTER (OUTPATIENT)
Dept: HOSPITAL 45 - C.LABSPEC | Age: 82
Discharge: HOME | End: 2017-12-21
Attending: ORTHOPAEDIC SURGERY
Payer: COMMERCIAL

## 2017-12-21 DIAGNOSIS — Z96.649: ICD-10-CM

## 2017-12-21 DIAGNOSIS — Z51.81: Primary | ICD-10-CM

## 2017-12-21 DIAGNOSIS — Z79.01: ICD-10-CM

## 2017-12-21 LAB — INR PPP: 3.2 (ref 0.9–1.1)

## 2018-01-08 ENCOUNTER — HOSPITAL ENCOUNTER (OUTPATIENT)
Dept: HOSPITAL 45 - C.RDSM | Age: 83
Discharge: HOME | End: 2018-01-08
Attending: ORTHOPAEDIC SURGERY
Payer: COMMERCIAL

## 2018-01-08 DIAGNOSIS — M16.0: Primary | ICD-10-CM

## 2018-02-06 NOTE — CODING QUERY NO DIAGNOSIS
***Valid Physician Order Needed***



A valid physician order must be submitted in order to properly bill for the service(s) 
provided, including date of service(s), valid diagnosis, and physician signature. If these 
tests are done on a recurring basis the original physician order must be submitted in 
order to code and bill for the service(s) provided.



****Please fax us the original, signed physician order so that we may expedite billing to 
[phone]



DOS 12/14/2017



* PROTHROMBIN TIME





Thank you  

[facility] Information Management

Phone:  [phone]

Fax:  [phone]

## 2018-02-12 ENCOUNTER — HOSPITAL ENCOUNTER (OUTPATIENT)
Dept: HOSPITAL 45 - C.LAB | Age: 83
Discharge: HOME | End: 2018-02-12
Attending: INTERNAL MEDICINE
Payer: COMMERCIAL

## 2018-02-12 DIAGNOSIS — I48.91: ICD-10-CM

## 2018-02-12 DIAGNOSIS — I71.4: Primary | ICD-10-CM

## 2018-02-12 LAB
BUN SERPL-MCNC: 13 MG/DL (ref 7–18)
CREAT SERPL-MCNC: 0.87 MG/DL (ref 0.6–1.2)
INR PPP: 1.3 (ref 0.9–1.1)

## 2018-02-21 ENCOUNTER — HOSPITAL ENCOUNTER (OUTPATIENT)
Dept: HOSPITAL 45 - C.CTS | Age: 83
Discharge: HOME | End: 2018-02-21
Attending: SURGERY
Payer: COMMERCIAL

## 2018-02-21 DIAGNOSIS — I70.203: ICD-10-CM

## 2018-02-21 DIAGNOSIS — I71.4: Primary | ICD-10-CM

## 2018-02-21 NOTE — DIAGNOSTIC IMAGING REPORT
[facility]IO [facility]



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



TECHNIQUE: . Claudication.



COMPARISON STUDY:  None



FINDINGS: Moderate generalized aneurysmal dilatation of the abdominal aorta.

Origin of the celiac axis and renal arteries bilaterally shows minimal plaque

formation. No significant stenosis is appreciated.



Beginning immediately inferior to the insertion of the renal arteries is an

aneurysm of the abdominal aorta. This is partially thrombus filled. It has a

maximum diameter of 5.1 x 4.5 cm. True lumen is 2.6 cm. Aneurysm extends to a

position slightly superior to the aortic bifurcation. There is no evidence for

contrast extravasation.



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 Moderate

plaque formation is identified bilaterally. There is moderate narrowing of the

deep iliac arteries with the primary iliac arteries showing no major stenotic

process.



Common femoral arteries demonstrate moderate plaque formation bilaterally.

Superficial femoral arteries are similar. A major stenotic process is not seen.

There is approximately a 50% narrowing of the right superficial femoral artery

distally transaxial image 624. There are Findings of mild atherosclerotic change

of the popliteal arteries with no significant stenotic process. Left popliteal

artery is somewhat compromised in terms of evaluation due to artifact from the

patient's left knee prosthetic.



Evaluation of the lower legs shows three-vessel runoff bilaterally. Moderate

scattered plaque formation is present although a high degree of stenosis is not

felt to be present. There is moderate soft tissue edematous change of the lower

extremities bilaterally. This primarily involves the subcutaneous fat regions.



IMPRESSION:  

1. Aneurysmal dilatation of the abdominal aorta extending immediately inferior

to the renal arteries to a position slightly superior to the aortic bifurcation.

2. The aneurysm is partially thrombus filled. Maximum diameter is 5.1 cm with

true luminal diameter of 2.6 cm.

3. Moderate atherosclerotic change of the arterial structures of the pelvis and

legs with no evidence for high-grade stenotic process. 









The above report was generated using voice recognition software.  It may contain

grammatical, syntax or spelling errors.







Electronically signed by:  Evgeny Cardozo M.D.

2/21/2018 9:50 AM



Dictated Date/Time:  2/21/2018 9:37 AM

## 2018-03-28 ENCOUNTER — HOSPITAL ENCOUNTER (EMERGENCY)
Dept: HOSPITAL 45 - C.EDA | Age: 83
Discharge: HOME | End: 2018-03-28
Payer: COMMERCIAL

## 2018-03-28 VITALS
WEIGHT: 199.08 LBS | HEIGHT: 65.98 IN | WEIGHT: 199.08 LBS | BODY MASS INDEX: 31.99 KG/M2 | TEMPERATURE: 98.24 F | BODY MASS INDEX: 31.99 KG/M2 | HEIGHT: 65.98 IN

## 2018-03-28 VITALS — SYSTOLIC BLOOD PRESSURE: 157 MMHG | OXYGEN SATURATION: 97 % | DIASTOLIC BLOOD PRESSURE: 79 MMHG | HEART RATE: 86 BPM

## 2018-03-28 DIAGNOSIS — Z85.3: ICD-10-CM

## 2018-03-28 DIAGNOSIS — Z88.8: ICD-10-CM

## 2018-03-28 DIAGNOSIS — Z88.1: ICD-10-CM
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
## 2018-03-28 DIAGNOSIS — G47.30: ICD-10-CM

## 2018-03-28 DIAGNOSIS — M16.10: ICD-10-CM

## 2018-03-28 DIAGNOSIS — M25.561: Primary | ICD-10-CM

## 2018-03-28 DIAGNOSIS — Z96.652: ICD-10-CM

## 2018-03-28 DIAGNOSIS — I10: ICD-10-CM

## 2018-03-28 DIAGNOSIS — Z79.01: ICD-10-CM

## 2018-03-28 DIAGNOSIS — Z91.048: ICD-10-CM
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
## 2018-03-28 DIAGNOSIS — Z87.891: ICD-10-CM

## 2018-03-28 DIAGNOSIS — Z96.649: ICD-10-CM

## 2018-03-28 DIAGNOSIS — Z88.5: ICD-10-CM

## 2018-03-28 DIAGNOSIS — Z79.899: ICD-10-CM

## 2018-03-28 NOTE — EMERGENCY ROOM VISIT NOTE
History


Report prepared by Onofre:  Saud Hung


Under the Supervision of:  Dr. Ebony Godfrey D.O.


First contact with patient:  09:41


Chief Complaint:  KNEEPAIN


Stated Complaint:  FALL/KNEE PAIN





History of Present Illness


The patient is an 82 year old female with a history of hypertension and atrial 

fibrillation who presents to the Emergency Room with complaints of a sudden 

mechanical fall that occurred earlier this morning. She states that she was 

standing up, putting her slippers on, when she lost her balance and slowly fell 

to the ground. The patient says that she was able to catch herself with a piece 

of furniture on the fall, so she was able to slow her fall. She notes that she 

landed on her right buttock, but the only pain that she has had ever since the 

fall has been persistent right knee pain. The patient states that she thinks 

the knee may be a bit more swollen than normally as well. She adds that she was 

able to take a step after the fall, but the knee hurt "like bloody hell", and 

it reminds her of the feeling of when she had a torn meniscus. She says that 

the pain worsens with movement. The patient states that it did not feel like 

the knee was going to give out. She denies any other pain, including back pain 

or hip pain. She states that she did not hit her head on the fall. The patient 

adds that she felt completely fine before the fall. She adds that she had 2 

recent hip replacements, and has also had a left knee replacement in the past. 

The patient takes HCTZ and Warfarin daily.





   Source of History:  patient


   Onset:  Earlier this morning


   Position:  other (global)


   Symptom Intensity:  slipped while putting slippers on, felt completely fine 

beforehand


   Quality:  other (mechanical fall)


   Timing:  other (sudden)


   Associated Symptoms:  No LOC, No back pain


Note:


Associated symptoms: Right knee pain. Denies any other pain, including hip 

pain. Did not hit head.





Review of Systems


See HPI for pertinent positives & negatives. A total of 10 systems reviewed and 

were otherwise negative.





Past Medical & Surgical


Medical Problems:


(1) Benign hypertension


(2) breast cancer


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


(4) idiopathic angioedema 


(5) Replacement of total knee joint


(6) Sleep apnea








Family History


History limited secondary to patient's advanced age.





Social History


Smoking Status:  Former Smoker


Drug Use:  none


Marital Status:  


Housing Status:  lives alone


Occupation Status:  retired





Current/Historical Medications


Scheduled


Ascorbic Acid (Vitamin C), 1 TAB PO DAILY


Azelastine Hcl (Astelin Nasal Spray), 2 SPRAYS DIEGO DAILY


Carboxymethylcellulose Sodium (Sterile Lubricant Drops), 1 DROP OPB PRN


Cholecalciferol (Vitamin D3), 1 TAB PO QAM


Diphenhydramine Hcl (Benadryl Allergy), 1 CAP PO BID


Fluticasone Propionate (Nasal) (Flonase Allergy Relief), 1 SPRAY DIEGO DAILY


Hydrochlorothiazide (Hctz), 12.5 MG PO QAM


Loratadine (Claritin), 10 MG PO QAM


Nystatin/Triamcinolone (Mycogen || ), 1 DOSE TOP PRN


Polyethylene Glycol 3350 (Miralax), 17 GM PO QPM


Psyllium (Metamucil), 1 DOSE PO QPM


Timolol Maleate (Ophth) (Timoptic 0.25% Oph), 1 DROP OPB QAM


Verapamil Hcl (Calan Sr Ext Rel), 180 MG PO UD


Warfarin Sod (Jantoven), 5 MG PO AS DIRECTED





Scheduled PRN


Acetaminophen/Codeine (Tylenol W/Codeine #3), 1 TAB PO Q4 PRN for Pain


Epinephrine (Epipen), 0.3 MG IM UD PRN for ALLERGIC REACTION


Guaifenesin Ext Rel (Mucinex Ext Rel), 600 MG PO Q12 PRN for prn


Pseudoephedrine Hcl (Sudafed), 30 MG PO UD PRN for prn


Triamcinolone Acet (Triamcinolone Acetonide), 1 APPLN TOP BID PRN for prn





Allergies


Coded Allergies:  


     Aspirin (Verified  Allergy, Severe, TONGUE AND THROAT SWELLS, 3/30/18)


     Cephalexin (Verified  Allergy, Severe, swelling tongue and throat, 3/30/18)


     Omeprazole (Verified  Allergy, Severe, ANAPHYLAXIS, 3/30/18)


     Tramadol (Verified  Allergy, Severe, ANAPHYLAXIS, 3/30/18)


     Amoxicillin (Unverified  Allergy, Intermediate, VOMIT, 3/30/18)


     Clavulanic Acid (Unverified  Allergy, Intermediate, VOMIT, 3/30/18)


     Monosodium Glutamate (Verified  Allergy, Mild, throat swell, 3/30/18)


     Erythromycin (Verified  Allergy, Unknown, SWELLING AND RASH, 3/30/18)


     Tetracycline (Verified  Allergy, Unknown, SWELLING AND RASH, 3/30/18)


     Metoprolol (Unverified  Adverse Reaction, Intermediate, DISORIENTED, 3/30/

18)


     Alendronate (Verified  Adverse Reaction, Unknown, very upset stomach, 3/30/

18)


Uncoded Allergies:  


     perfumes (Allergy, Unknown, can't breathe, 10/1/12)





Physical Exam


Vital Signs











  Date Time  Temp Pulse Resp B/P (MAP) Pulse Ox O2 Delivery O2 Flow Rate FiO2


 


3/28/18 11:15  86 18 157/79 97 Room Air  


 


3/28/18 09:20 36.8 64 18 134/72 97 Room Air  


 


3/28/18 09:20 36.8 64 18 134/72 97 Room Air  











Physical Exam


GENERAL: alert, well appearing, well nourished, no distress, non-toxic 


EYE EXAM: normal conjunctiva, PERRL and EOM's grossly intact


OROPHARYNX: no exudate, no erythema, lips, buccal mucosa, and tongue normal and 

mucous membranes are moist


NECK: supple, no nuchal rigidity, no adenopathy, non-tender


LUNGS: Clear to auscultation. Normal chest wall mechanics


HEART: no murmurs, S1 normal and S2 normal 


ABDOMEN: abdomen soft, non-tender, normo-active bowel sounds, no masses, no 

rebound or guarding. 


BACK: Back is symmetrical on inspection and there is no deformity, no midline 

tenderness, no CVA tenderness. 


SKIN: no rashes and no bruising 


UPPER EXTREMITIES: upper extremities are grossly normal. 


LOWER EXTREMITIES: Pain with palpation of right lateral hip. Pain with 

palpation of right knee posteriorly and anteriorly, worse on the medial 

inferior aspect. No obvious deformity, no ecchymosis. Slight edema noted to 

opposite side. Slightly decreased range of motion of right leg.


NEURO EXAM: Normal sensorium, cranial nerves II-XII grossly intact, normal 

speech,  no gross weakness of arms, no gross weakness of legs.





Medical Decision & Procedures


ER Provider


Diagnostic Interpretation:


X-ray results have been interpreted by the radiologist and reviewed by me.








RIGHT KNEE 3 VIEWS





CLINICAL HISTORY: Fall with right knee pain.





FINDINGS: AP, crosstable lateral, and sunrise views of the right knee are


obtained. No prior studies are available for comparison at the time of


dictation. The skeletal structures are osteopenic. There is no radiographic


evidence of fracture. There is moderate tricompartmental joint joint space


narrowing, greatest in the medial and patellofemoral compartments. There are


large marginal osteophytes as well as patellar enthesophytes. A large joint


effusion is identified. Atherosclerotic calcification is noted in the popliteal


artery. Soft tissue edema is seen around the knee.





IMPRESSION: Soft tissue swelling and large joint effusion with no radiographic


evidence of right knee fracture.








Electronically signed by:  Walter Cleveland M.D.


3/28/2018 10:33 AM





Dictated Date/Time:  3/28/2018 10:31 AM











SINGLE VIEW PELVIS; 2 VIEWS RIGHT HIP





CLINICAL HISTORY: Fall. Right hip pain.





FINDINGS: An AP view of the pelvis with AP and frog-leg views of the right hip


are compared to study dated 1/8/2018. The skeletal structures are osteopenic.


There is no radiographic evidence of fracture involving the hips or bony pelvis.


Bilateral hip arthroplasties are in near-anatomic alignment. No periprosthetic


lucency is identified. The sacroiliac joints are normal. Sclerotic change is


noted in the pubic symphysis. Lumbosacral spondylosis is partially visualized.


The overlying soft tissues are within normal limits. Atherosclerotic


calcification is seen in the femoral arteries. Phleboliths are noted in the


pelvis. There is moderate colonic fecal retention.





IMPRESSION:  There is no radiographic evidence of fracture involving the hips or


bony pelvis.








Electronically signed by:  Walter Cleveland M.D.


3/28/2018 10:31 AM





Dictated Date/Time:  3/28/2018 10:29 AM





Laboratory Results











Test


  3/28/18


11:48


 


Bedside Prothrombin Time INR 2.9 (0.9-1.1) 











Medications Administered











 Medications


  (Trade)  Dose


 Ordered  Sig/Hanane


 Route  Start Time


 Stop Time Status Last Admin


Dose Admin


 


 Acetaminophen


  (Tylenol Tab)  650 mg  NOW  STAT


 PO  3/28/18 10:39


 3/28/18 10:40 DC 3/28/18 11:11


650 MG











ED Course


0943: The patient was evaluated in room A4B. A complete history and physical 

exam was performed. 





1039: Tylenol Tab 650 mg PO.





1125: Upon reevaluation, the patient's pain is better. Pt able to ambulate here 

with a walker without difficulty.  Pt has both cane and walker at home.  Pt 

with a home health aid who comes in once a week.   to help pt arrange 

additional weekly aids. I discussed the findings and the treatment plan with 

the patient.  She verbalizes agreement and understanding.  She was discharged 

home.





Medical Decision


Differential diagnosis:


Etiologies such as fracture, dislocation, neurovascular compromise, compartment 

syndrome, soft tissue injury, as well as others were entertained. 








Pt with no other complaints or evidence of trauma.  Pt here well appearing and 

xrays negative.  Did not feel pt required additional imaging.  Pt able to 

ambulate here and would like to return home.  Discussed with pt follow-up with 

family doctor as a precaution.  Discussed sx to watch/return for, she 

verbalized understanding and was agreeable with plan.  Doubt occult significant 

traumatic injury.  Pt INR improved from last check and no longer 

supratherapeutic.





Medication Reconcilliation


Current Medication List:  was personally reviewed by me





Blood Pressure Screening


Patient's blood pressure:  Elevated blood pressure


Blood pressure disposition:  Elevated BP felt to be situational





Impression





 Primary Impression:  


 Knee pain


 Additional Impression:  


 Fall





Scribe Attestation


The scribe's documentation has been prepared under my direction and personally 

reviewed by me in its entirety. I confirm that the note above accurately 

reflects all work, treatment, procedures, and medical decision making performed 

by me.





Departure Information


Dispostion


Home / Self-Care





Referrals


Keon Gross M.D. (PCP)





Patient Instructions


My [facility]





Additional Instructions





Please call and follow-up with your family doctor to recheck your injury.  

Please use the walker to help prevent any recurrent falls.  If you have 

persistent pain, your family doctor may send you for additional evaluation by 

orthopedics or for additional imaging.  Please continue your regular 

medications as prescribed.  If you develop worsening pain, increased swelling, 

bleeding from any source, are unable to walk, develop fevers, worsening leg 

swelling, numbness or tingling, or you have any other new concerns, please 

return the emergency room.





Problem Qualifiers








 Primary Impression:  


 Knee pain


 Chronicity:  acute  Laterality:  right  Qualified Codes:  M25.561 - Pain in 

right knee


 Additional Impression:  


 Fall


 Encounter type:  initial encounter  Qualified Codes:  W19.XXXA - Unspecified 

fall, initial encounter

## 2018-03-28 NOTE — DIAGNOSTIC IMAGING REPORT
SINGLE VIEW PELVIS; 2 VIEWS RIGHT HIP



CLINICAL HISTORY: Fall. Right hip pain.



FINDINGS: An AP view of the pelvis with AP and frog-leg views of the right hip

are compared to study dated 1/8/2018. The skeletal structures are osteopenic.

There is no radiographic evidence of fracture involving the hips or bony pelvis.

Bilateral hip arthroplasties are in near-anatomic alignment. No periprosthetic

lucency is identified. The sacroiliac joints are normal. Sclerotic change is

noted in the pubic symphysis. Lumbosacral spondylosis is partially visualized.

The overlying soft tissues are within normal limits. Atherosclerotic

calcification is seen in the femoral arteries. Phleboliths are noted in the

pelvis. There is moderate colonic fecal retention.



IMPRESSION:  There is no radiographic evidence of fracture involving the hips or

bony pelvis.







Electronically signed by:  Walter Cleveland M.D.

3/28/2018 10:31 AM



Dictated Date/Time:  3/28/2018 10:29 AM

## 2018-03-28 NOTE — DIAGNOSTIC IMAGING REPORT
RIGHT KNEE 3 VIEWS



CLINICAL HISTORY: Fall with right knee pain.



FINDINGS: AP, crosstable lateral, and sunrise views of the right knee are

obtained. No prior studies are available for comparison at the time of

dictation. The skeletal structures are osteopenic. There is no radiographic

evidence of fracture. There is moderate tricompartmental joint joint space

narrowing, greatest in the medial and patellofemoral compartments. There are

large marginal osteophytes as well as patellar enthesophytes. A large joint

effusion is identified. Atherosclerotic calcification is noted in the popliteal

artery. Soft tissue edema is seen around the knee.



IMPRESSION: Soft tissue swelling and large joint effusion with no radiographic

evidence of right knee fracture.







Electronically signed by:  Walter Cleveland M.D.

3/28/2018 10:33 AM



Dictated Date/Time:  3/28/2018 10:31 AM

## 2018-03-30 ENCOUNTER — HOSPITAL ENCOUNTER (EMERGENCY)
Dept: HOSPITAL 45 - C.EDB | Age: 83
Discharge: HOME | End: 2018-03-30
Payer: COMMERCIAL

## 2018-03-30 VITALS
BODY MASS INDEX: 33.66 KG/M2 | WEIGHT: 209.44 LBS | HEIGHT: 65.98 IN | WEIGHT: 209.44 LBS | HEIGHT: 65.98 IN | BODY MASS INDEX: 33.66 KG/M2

## 2018-03-30 VITALS
HEART RATE: 77 BPM | OXYGEN SATURATION: 95 % | DIASTOLIC BLOOD PRESSURE: 88 MMHG | SYSTOLIC BLOOD PRESSURE: 132 MMHG | TEMPERATURE: 97.7 F

## 2018-03-30 DIAGNOSIS — Z79.899: ICD-10-CM

## 2018-03-30 DIAGNOSIS — G47.30: ICD-10-CM

## 2018-03-30 DIAGNOSIS — Z96.659: ICD-10-CM

## 2018-03-30 DIAGNOSIS — M25.061: Primary | ICD-10-CM

## 2018-03-30 DIAGNOSIS — Z79.01: ICD-10-CM

## 2018-03-30 DIAGNOSIS — Z88.0: ICD-10-CM

## 2018-03-30 DIAGNOSIS — R10.11: ICD-10-CM

## 2018-03-30 DIAGNOSIS — Z79.82: ICD-10-CM

## 2018-03-30 DIAGNOSIS — I10: ICD-10-CM

## 2018-03-30 DIAGNOSIS — Z85.3: ICD-10-CM

## 2018-03-30 DIAGNOSIS — Z91.048: ICD-10-CM

## 2018-03-30 LAB
ALBUMIN SERPL-MCNC: 3.7 GM/DL (ref 3.4–5)
ALP SERPL-CCNC: 107 U/L (ref 45–117)
ALT SERPL-CCNC: 18 U/L (ref 12–78)
AST SERPL-CCNC: 17 U/L (ref 15–37)
BASOPHILS # BLD: 0.03 K/UL (ref 0–0.2)
BASOPHILS NFR BLD: 0.3 %
BUN SERPL-MCNC: 14 MG/DL (ref 7–18)
CALCIUM SERPL-MCNC: 8.9 MG/DL (ref 8.5–10.1)
CO2 SERPL-SCNC: 26 MMOL/L (ref 21–32)
CREAT SERPL-MCNC: 0.97 MG/DL (ref 0.6–1.2)
EOS ABS #: 0.11 K/UL (ref 0–0.5)
EOSINOPHIL NFR BLD AUTO: 187 K/UL (ref 130–400)
GLUCOSE SERPL-MCNC: 111 MG/DL (ref 70–99)
HCT VFR BLD CALC: 41.4 % (ref 37–47)
HGB BLD-MCNC: 14.3 G/DL (ref 12–16)
IG#: 0.02 K/UL (ref 0–0.02)
IMM GRANULOCYTES NFR BLD AUTO: 17.4 %
INR PPP: 2.2 (ref 0.9–1.1)
LIPASE: 120 U/L (ref 73–393)
LYMPHOCYTES # BLD: 1.66 K/UL (ref 1.2–3.4)
MCH RBC QN AUTO: 30.5 PG (ref 25–34)
MCHC RBC AUTO-ENTMCNC: 34.5 G/DL (ref 32–36)
MCV RBC AUTO: 88.3 FL (ref 80–100)
MONO ABS #: 0.7 K/UL (ref 0.11–0.59)
MONOCYTES NFR BLD: 7.4 %
NEUT ABS #: 7 K/UL (ref 1.4–6.5)
NEUTROPHILS # BLD AUTO: 1.2 %
NEUTROPHILS NFR BLD AUTO: 73.5 %
PMV BLD AUTO: 9.3 FL (ref 7.4–10.4)
POTASSIUM SERPL-SCNC: 3.6 MMOL/L (ref 3.5–5.1)
PROT SERPL-MCNC: 7.6 GM/DL (ref 6.4–8.2)
PTT PATIENT: 38.5 SECONDS (ref 21–31)
RED CELL DISTRIBUTION WIDTH CV: 15.4 % (ref 11.5–14.5)
RED CELL DISTRIBUTION WIDTH SD: 49.6 FL (ref 36.4–46.3)
SODIUM SERPL-SCNC: 133 MMOL/L (ref 136–145)
WBC # BLD AUTO: 9.52 K/UL (ref 4.8–10.8)

## 2018-03-30 RX ADMIN — ONDANSETRON STA MG: 2 INJECTION INTRAMUSCULAR; INTRAVENOUS at 16:49

## 2018-03-30 RX ADMIN — ONDANSETRON STA MG: 2 INJECTION INTRAMUSCULAR; INTRAVENOUS at 17:18

## 2018-03-30 NOTE — DIAGNOSTIC IMAGING REPORT
ULTRASOUND RIGHT UPPER QUADRANT ABDOMEN



CLINICAL HISTORY: Right upper quadrant abdominal pain.



COMPARISON STUDY: Abdominal CT dated 2/21/2018.



TECHNIQUE: Real-time, grayscale, and color flow sonography of the right upper

quadrant of the abdomen was performed. Images are reviewed in the transverse and

longitudinal planes.



FINDINGS:



Liver: The liver is mildly enlarged, measuring 19.2 cm in length. The liver

demonstrates heterogeneously increased echotexture suggesting steatosis. There

is no intrahepatic biliary ductal dilatation. The main portal vein is patent.



Gallbladder: The gallbladder is normal in appearance. No gallstones are

identified. There is no gallbladder wall thickening or pericholecystic fluid. A

sonographic Pelaez's sign is reportedly absent. The common bile duct measures up

to 0.7 cm in diameter.



Pancreas: Visualized portions of the pancreatic head and body are normal in

appearance. The splenic vein is patent.



Right kidney: Survey images of the right kidney demonstrate mild cortical

atrophy. There is no hydronephrosis.



Ascites: None.



IMPRESSION: 



1. No acute sonographic abnormality is identified in the right upper quadrant.

No gallstones are seen.



2. Hepatomegaly and mild hepatic steatosis.







Electronically signed by:  Walter Cleveland M.D.

3/30/2018 6:25 PM



Dictated Date/Time:  3/30/2018 6:23 PM

## 2018-03-30 NOTE — DIAGNOSTIC IMAGING REPORT
TWO VIEW CHEST



CLINICAL HISTORY: Upper abdominal pain.



FINDINGS: AP and lateral chest radiographs are compared to study dated

8/11/2017. The AP view is degraded by patient rotation. The heart is enlarged

and there is atherosclerotic calcification of the thoracic aorta. The pulmonary

vasculature is noncongested. Chronic interstitial thickening is similar to

previous. There is mild bibasilar atelectasis. No airspace consolidation or

large pleural effusion is seen. There is no pneumothorax. The skeletal

structures are osteopenic. Advanced arthritic change is seen in the shoulders.

Surgical clips are noted in the left breast.



IMPRESSION: Cardiomegaly with no acute cardiopulmonary abnormality.







Electronically signed by:  Walter Cleveland M.D.

3/30/2018 5:56 PM



Dictated Date/Time:  3/30/2018 5:54 PM

## 2018-03-30 NOTE — EMERGENCY ROOM VISIT NOTE
History


First contact with patient:  16:06


Chief Complaint:  KNEEPAIN


Stated Complaint:  HURT KNEE, SWOLLEN, INTERNAL BLEEDING





History of Present Illness


The patient is a 82 year old female who presents to the Emergency Room at the 

bequest of her PCP who practices at Dr. Gross's office. Pt was 25 min late 

to her appointment today which was meant to be a non-acute visit for her RUQ 

abdominal discomfort which she has had for several weeks now.  Pt presented to 

the office, and nursing noticed her right knee was swollen and red. This is the 

knee for which she was seen on Wednesday here in the ED after falling on it in 

her home, XR of which were negative for fracture.  She notes it feels better 

today than yesterday and the day before, however it is swollen and darker in 

pink color when compared to the other.  She has been icing it and taking 

percocet sparingly bc she says it makes her constipated. Anyway, her PCP told 

nursing to tell her to come the ED for ultrasound of her knee in case it was 

bleeding inside the joint, pt is on coumadin, but stopped it this morning at 

the request of her other doctor who prescribes her this. 





Pt's abdominal pain is stable today, although it is a persistent pain in her 

RUQ that is worse when she moves.  Has been present for 4 weeks.  A/w diarrhea 

and constipation. Denies fevers or chills or vomiting.





Review of Systems


ROS


See HPI for pertinent positives and negatives.





Past Medical/Surgical History


Medical Problems:


(1) Benign hypertension


(2) breast cancer


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


(4) idiopathic angioedema 


(5) Replacement of total knee joint


(6) Sleep apnea








Social History


Smoking Status:  Never Smoker


Drug Use:  none


Marital Status:  


Housing Status:  lives alone


Occupation Status:  retired





Current/Historical Medications


Scheduled


Ascorbic Acid (Vitamin C), 1 TAB PO DAILY


Azelastine Hcl (Astelin Nasal Spray), 2 SPRAYS DIEGO DAILY


Carboxymethylcellulose Sodium (Sterile Lubricant Drops), 1 DROP OPB PRN


Cholecalciferol (Vitamin D3), 1 TAB PO QAM


Diphenhydramine Hcl (Benadryl Allergy), 1 CAP PO BID


Fluticasone Propionate (Nasal) (Flonase Allergy Relief), 1 SPRAY DIEGO DAILY


Hydrochlorothiazide (Hctz), 12.5 MG PO QAM


Loratadine (Claritin), 10 MG PO QAM


Nystatin/Triamcinolone (Mycogen || ), 1 DOSE TOP PRN


Polyethylene Glycol 3350 (Miralax), 17 GM PO QPM


Psyllium (Metamucil), 1 DOSE PO QPM


Timolol Maleate (Ophth) (Timoptic 0.25% Oph), 1 DROP OPB QAM


Verapamil Hcl (Calan Sr Ext Rel), 180 MG PO UD


Warfarin Sod (Jantoven), 5 MG PO AS DIRECTED





Scheduled PRN


Acetaminophen/Codeine (Tylenol W/Codeine #3), 1 TAB PO Q4 PRN for Pain


Epinephrine (Epipen), 0.3 MG IM UD PRN for ALLERGIC REACTION


Guaifenesin Ext Rel (Mucinex Ext Rel), 600 MG PO Q12 PRN for prn


Pseudoephedrine Hcl (Sudafed), 30 MG PO UD PRN for prn


Triamcinolone Acet (Triamcinolone Acetonide), 1 APPLN TOP BID PRN for prn





Physical Exam


Vital Signs











  Date Time  Temp Pulse Resp B/P (MAP) Pulse Ox O2 Delivery O2 Flow Rate FiO2


 


3/30/18 18:20  80 18 123/88 95 Room Air  


 


3/30/18 17:16  80 18 120/89 95 Room Air  


 


3/30/18 15:17 36.5 119 18 145/98 95   











Physical Exam


GENERAL: Awake, alert, well-appearing, in no distress. In a wheelchair.


HENT: Normocephalic, atraumatic. 


EYES: Normal conjunctiva. Sclera non-icteric.


NECK: Supple. FROM. No JVD.


RESPIRATORY: Clear to auscultation.


CARDIAC: Regular rate, normal rhythm. Extremities warm and well perfused. 

Pulses equal.


ABDOMEN: Soft, non-distended. Moderate tenderness to palpation in RUQ. No 

rebound or guarding. No masses.


LOWER EXTREMITIES: Right knee shows moderate effusion, erythema.  Neg for 

abrasion.  Hurts to move it.  Negative magdalena's sign bilaterally. 


NEURO: No motor deficits noted. 


SKIN: No rash or jaundice noted.





Medical Decision & Procedures


Laboratory Results


3/30/18 17:15








Red Blood Count 4.69, Mean Corpuscular Volume 88.3, Mean Corpuscular Hemoglobin 

30.5, Mean Corpuscular Hemoglobin Concent 34.5, Mean Platelet Volume 9.3, 

Neutrophils (%) (Auto) 73.5, Lymphocytes (%) (Auto) 17.4, Monocytes (%) (Auto) 

7.4, Eosinophils (%) (Auto) 1.2, Basophils (%) (Auto) 0.3, Neutrophils # (Auto) 

7.00, Lymphocytes # (Auto) 1.66, Monocytes # (Auto) 0.70, Eosinophils # (Auto) 

0.11, Basophils # (Auto) 0.03





3/30/18 17:15

















Test


  3/30/18


17:15


 


White Blood Count


  9.52 K/uL


(4.8-10.8)


 


Red Blood Count


  4.69 M/uL


(4.2-5.4)


 


Hemoglobin


  14.3 g/dL


(12.0-16.0)


 


Hematocrit 41.4 % (37-47) 


 


Mean Corpuscular Volume


  88.3 fL


()


 


Mean Corpuscular Hemoglobin


  30.5 pg


(25-34)


 


Mean Corpuscular Hemoglobin


Concent 34.5 g/dl


(32-36)


 


Platelet Count


  187 K/uL


(130-400)


 


Mean Platelet Volume


  9.3 fL


(7.4-10.4)


 


Neutrophils (%) (Auto) 73.5 % 


 


Lymphocytes (%) (Auto) 17.4 % 


 


Monocytes (%) (Auto) 7.4 % 


 


Eosinophils (%) (Auto) 1.2 % 


 


Basophils (%) (Auto) 0.3 % 


 


Neutrophils # (Auto)


  7.00 K/uL


(1.4-6.5)


 


Lymphocytes # (Auto)


  1.66 K/uL


(1.2-3.4)


 


Monocytes # (Auto)


  0.70 K/uL


(0.11-0.59)


 


Eosinophils # (Auto)


  0.11 K/uL


(0-0.5)


 


Basophils # (Auto)


  0.03 K/uL


(0-0.2)


 


RDW Standard Deviation


  49.6 fL


(36.4-46.3)


 


RDW Coefficient of Variation


  15.4 %


(11.5-14.5)


 


Immature Granulocyte % (Auto) 0.2 % 


 


Immature Granulocyte # (Auto)


  0.02 K/uL


(0.00-0.02)


 


Prothrombin Time


  23.0 SECONDS


(9.0-12.0)


 


Prothromb Time International


Ratio 2.2 (0.9-1.1) 


 


 


Activated Partial


Thromboplast Time 38.5 SECONDS


(21.0-31.0)


 


Partial Thromboplastin Ratio 1.5 


 


Urine Color YELLOW 


 


Urine Appearance CLEAR (CLEAR) 


 


Urine pH 5.0 (4.5-7.5) 


 


Urine Specific Gravity


  1.014


(1.000-1.030)


 


Urine Protein NEG (NEG) 


 


Urine Glucose (UA) NEG (NEG) 


 


Urine Ketones NEG (NEG) 


 


Urine Occult Blood NEG (NEG) 


 


Urine Nitrite NEG (NEG) 


 


Urine Bilirubin NEG (NEG) 


 


Urine Urobilinogen NEG (NEG) 


 


Urine Leukocyte Esterase SMALL (NEG) 


 


Urine WBC (Auto)


  5-10 /hpf


(0-5)


 


Urine RBC (Auto) 0-4 /hpf (0-4) 


 


Urine Hyaline Casts (Auto) 0 /lpf (0-5) 


 


Urine Epithelial Cells (Auto)


  10-20 /lpf


(0-5)


 


Urine Bacteria (Auto) NEG (NEG) 


 


Anion Gap


  9.0 mmol/L


(3-11)


 


Est Creatinine Clear Calc


Drug Dose 52.8 ml/min 


 


 


Estimated GFR (


American) 63.5 


 


 


Estimated GFR (Non-


American 54.8 


 


 


BUN/Creatinine Ratio 14.1 (10-20) 


 


Calcium Level


  8.9 mg/dl


(8.5-10.1)


 


Total Bilirubin


  1.1 mg/dl


(0.2-1)


 


Direct Bilirubin


  0.2 mg/dl


(0-0.2)


 


Aspartate Amino Transf


(AST/SGOT) 17 U/L (15-37) 


 


 


Alanine Aminotransferase


(ALT/SGPT) 18 U/L (12-78) 


 


 


Alkaline Phosphatase


  107 U/L


()


 


Troponin I


  < 0.015 ng/ml


(0-0.045)


 


Total Protein


  7.6 gm/dl


(6.4-8.2)


 


Albumin


  3.7 gm/dl


(3.4-5.0)


 


Lipase


  120 U/L


()











Medications Administered











 Medications


  (Trade)  Dose


 Ordered  Sig/Hanane


 Route  Start Time


 Stop Time Status Last Admin


Dose Admin


 


 Acetaminophen/


 Codeine Phosphate


  (Tylenol w/


 Codeine #3 Tab)  1 tab  NOW  ONCE


 PO  3/30/18 17:00


 3/30/18 17:01 DC 3/30/18 17:06


1 TAB











Procedure


TWO VIEW CHEST





CLINICAL HISTORY: Upper abdominal pain.





FINDINGS: AP and lateral chest radiographs are compared to study dated


8/11/2017. The AP view is degraded by patient rotation. The heart is enlarged


and there is atherosclerotic calcification of the thoracic aorta. The pulmonary


vasculature is noncongested. Chronic interstitial thickening is similar to


previous. There is mild bibasilar atelectasis. No airspace consolidation or


large pleural effusion is seen. There is no pneumothorax. The skeletal


structures are osteopenic. Advanced arthritic change is seen in the shoulders.


Surgical clips are noted in the left breast.





IMPRESSION: Cardiomegaly with no acute cardiopulmonary abnormality.











Electronically signed by:  Walter Cleveland M.D.


3/30/2018 5:56 PM





Dictated Date/Time:  3/30/2018 5:54 PM














ULTRASOUND RIGHT UPPER QUADRANT ABDOMEN





CLINICAL HISTORY: Right upper quadrant abdominal pain.





COMPARISON STUDY: Abdominal CT dated 2/21/2018.





TECHNIQUE: Real-time, grayscale, and color flow sonography of the right upper


quadrant of the abdomen was performed. Images are reviewed in the transverse and


longitudinal planes.





FINDINGS:





Liver: The liver is mildly enlarged, measuring 19.2 cm in length. The liver


demonstrates heterogeneously increased echotexture suggesting steatosis. There


is no intrahepatic biliary ductal dilatation. The main portal vein is patent.





Gallbladder: The gallbladder is normal in appearance. No gallstones are


identified. There is no gallbladder wall thickening or pericholecystic fluid. A


sonographic Pelaez's sign is reportedly absent. The common bile duct measures up


to 0.7 cm in diameter.





Pancreas: Visualized portions of the pancreatic head and body are normal in


appearance. The splenic vein is patent.





Right kidney: Survey images of the right kidney demonstrate mild cortical


atrophy. There is no hydronephrosis.





Ascites: None.





IMPRESSION: 





1. No acute sonographic abnormality is identified in the right upper quadrant.


No gallstones are seen.





2. Hepatomegaly and mild hepatic steatosis.











Electronically signed by:  Walter Cleveland M.D.


3/30/2018 6:25 PM





Dictated Date/Time:  3/30/2018 6:23 PM





Medical Decision


The patient is a 82 year old female who presents to the Emergency Room at the 

quest of her PCP who practices at Dr. Gross's office. Pt was 25 min late 

to her appointment today which was meant to be a non-acute visit for her RUQ 

abdominal discomfort which she has had for several weeks now.  Pt presented to 

the office, and nursing noticed her right knee was swollen and red. This is the 

knee for which she was seen on Wednesday here in the ED after falling on it in 

her home, XR of which were negative for fracture.  She notes it feels better 

today than yesterday and the day before, however it is swollen and darker in 

pink color when compared to the other.  She has been icing it and taking 

percocet sparingly bc she says it makes her constipated. Anyway, her PCP told 

nursing to tell her to come the ED for ultrasound of her knee in case it was 

bleeding inside the joint, pt is on coumadin, but stopped it this morning at 

the request of her other doctor who prescribes her this. 





Pt's abdominal pain is stable today, although it is a persistent pain in her 

RUQ that is worse when she moves.  Has been present for 4 weeks.  A/w diarrhea 

and constipation. Denies fevers or chills or vomiting.





Diff dx: hemarthrosis, cholecystitis, appendicitis, pancreatitis





Pt's labs and ultrasound and CXR are all benign.  INR is 2.2, draining what is 

most likely a hemarthrosis would be not indicated at this time given that it 

will continue to bleed, and a tap would make her pain worse.  Pt has a 

prescription for tylenol with codeine per PDMP, so will not be writing a script 

for this at this time.  Knee will likely resorb on its own, continue ice and 

elevation.  Pt agreeable to plan, verbalized agreement. Follow up with PCP for 

further clinical management of her abdominal pain.





Impression





 Primary Impression:  


 Hemarthrosis, right knee





Departure Information


Dispostion


Home / Self-Care





Condition


GOOD





Referrals


Keon Gross M.D. (PCP)





Patient Instructions


My [facility]





Resident Tracking


Resident Involvement:  Resident Care Provided


Care Provided:  Adult ED

## 2018-03-30 NOTE — EMERGENCY ROOM VISIT NOTE
History


Report prepared by Onofre:  Veena Solorio


Under the Supervision of:  Dr. Marcos Choe M.D.


First contact with patient:  16:05


Chief Complaint:  KNEEPAIN


Stated Complaint:  HURT KNEE, SWOLLEN, INTERNAL BLEEDING





History of Present Illness


The patient is an 82 year old female who presents to the Emergency Room with 

complaints of persistent right knee pain with swelling for two days. She notes 

the pain is worsened with movement. She notes that she was seen at her PCP 

today for RUQ abdominal pain that she has had for four weeks. The patient was 

seen in the ED two days ago for a fall and right knee pain. While she was at 

her PCP's office, she notes her right knee was swollen and red. She states that 

she has been icing her knee and taking Percocet, with improved relief. She 

notes the Percocet makes her constipated, so she is only taking the medication 

sparingly. She was informed by the nursing staff at her PCP's office to come to 

the ED for evaluation of her right knee. The patient is currently on Coumadin, 

though she was informed to stop taking the blood thinner this morning by her 

cardiologist.  She states when she fell she fell onto her buttock but her hip 

and buttock are not really hurting her.  She does have some mild soreness 

there.  She denies any fever or vomiting.  She denies any chest pain or 

shortness of breath.  She has no urinary symptoms.





   Source of History:  patient


   Position:  knee (right)


   Quality:  other (swelling and red)


   Modifying Factors (Worsening):  movement


   Associated Symptoms:  + abdominal pain, No fevers, No chest pain, No SOB, No 

vomiting, No diarrhea, No urinary symptoms


Note:


She notes constipation.





Review of Systems


See HPI for pertinent positives & negatives. A total of 10 systems reviewed and 

were otherwise negative.





Past Medical & Surgical


Medical Problems:


(1) Benign hypertension


(2) breast cancer


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


(4) idiopathic angioedema 


(5) Replacement of total knee joint


(6) Sleep apnea








Family History





No pertinent family history





Social History


Smoking Status:  Never Smoker


Drug Use:  none


Marital Status:  


Housing Status:  lives alone


Occupation Status:  retired





Current/Historical Medications


Scheduled


Ascorbic Acid (Vitamin C), 1 TAB PO DAILY


Azelastine Hcl (Astelin Nasal Spray), 2 SPRAYS DIEGO DAILY


Carboxymethylcellulose Sodium (Sterile Lubricant Drops), 1 DROP OPB PRN


Cholecalciferol (Vitamin D3), 1 TAB PO QAM


Diphenhydramine Hcl (Benadryl Allergy), 1 CAP PO BID


Fluticasone Propionate (Nasal) (Flonase Allergy Relief), 1 SPRAY DIEGO DAILY


Hydrochlorothiazide (Hctz), 12.5 MG PO QAM


Loratadine (Claritin), 10 MG PO QAM


Nystatin/Triamcinolone (Mycogen || ), 1 DOSE TOP PRN


Polyethylene Glycol 3350 (Miralax), 17 GM PO QPM


Psyllium (Metamucil), 1 DOSE PO QPM


Timolol Maleate (Ophth) (Timoptic 0.25% Oph), 1 DROP OPB QAM


Verapamil Hcl (Calan Sr Ext Rel), 180 MG PO UD


Warfarin Sod (Jantoven), 5 MG PO AS DIRECTED





Scheduled PRN


Acetaminophen/Codeine (Tylenol W/Codeine #3), 1 TAB PO Q4 PRN for Pain


Epinephrine (Epipen), 0.3 MG IM UD PRN for ALLERGIC REACTION


Guaifenesin Ext Rel (Mucinex Ext Rel), 600 MG PO Q12 PRN for prn


Pseudoephedrine Hcl (Sudafed), 30 MG PO UD PRN for prn


Triamcinolone Acet (Triamcinolone Acetonide), 1 APPLN TOP BID PRN for prn





Allergies


Coded Allergies:  


     Aspirin (Verified  Allergy, Severe, TONGUE AND THROAT SWELLS, 3/30/18)


     Cephalexin (Verified  Allergy, Severe, swelling tongue and throat, 3/30/18)


     Omeprazole (Verified  Allergy, Severe, ANAPHYLAXIS, 3/30/18)


     Tramadol (Verified  Allergy, Severe, ANAPHYLAXIS, 3/30/18)


     Amoxicillin (Unverified  Allergy, Intermediate, VOMIT, 3/30/18)


     Clavulanic Acid (Unverified  Allergy, Intermediate, VOMIT, 3/30/18)


     Monosodium Glutamate (Verified  Allergy, Mild, throat swell, 3/30/18)


     Erythromycin (Verified  Allergy, Unknown, SWELLING AND RASH, 3/30/18)


     Tetracycline (Verified  Allergy, Unknown, SWELLING AND RASH, 3/30/18)


     Metoprolol (Unverified  Adverse Reaction, Intermediate, DISORIENTED, 3/30/

18)


     Alendronate (Verified  Adverse Reaction, Unknown, very upset stomach, 3/30/

18)


Uncoded Allergies:  


     perfumes (Allergy, Unknown, can't breathe, 10/1/12)





Physical Exam


Vital Signs











  Date Time  Temp Pulse Resp B/P (MAP) Pulse Ox O2 Delivery O2 Flow Rate FiO2


 


3/30/18 17:16  80 18 120/89 95 Room Air  


 


3/30/18 15:17 36.5 119 18 145/98 95   











Physical Exam





Constitutional: Vital signs reviewed.


Eyes: Pupils are equal round reactive to light.  Conjunctiva are noninjected.  


ENT: Pharynx is clear without erythema or exudate.  Mucous membranes are moist.

  Neck supple without meningeal signs.


Respiratory: Clear to auscultation bilaterally.  Breath sounds are equal 

bilaterally. 


Cardiovascular: Regular rate and rhythm.  No rubs or gallops.


GI: Soft, nondistended with mild tenderness to the right upper quadrant.  

Negative Pelaez sign.  Bowel sounds are present.


Musculoskeletal: Soft tissue swelling with diffuse tenderness to the anterior 

right knee.  There is no significant erythema except for below the knee under 

the tibial tuberosity.  No significant increased warmth.  No ecchymosis.  Full 

range of motion of the knee.  No tenderness to the right hip.


Integumentary: No cyanosis.


Neurological: The patient is awake and alert.  No focal deficits.


Psychiatric: Normal affect.





Medical Decision & Procedures


Laboratory Results


3/30/18 17:15








Red Blood Count 4.69, Mean Corpuscular Volume 88.3, Mean Corpuscular Hemoglobin 

30.5, Mean Corpuscular Hemoglobin Concent 34.5, Mean Platelet Volume 9.3, 

Neutrophils (%) (Auto) 73.5, Lymphocytes (%) (Auto) 17.4, Monocytes (%) (Auto) 

7.4, Eosinophils (%) (Auto) 1.2, Basophils (%) (Auto) 0.3, Neutrophils # (Auto) 

7.00, Lymphocytes # (Auto) 1.66, Monocytes # (Auto) 0.70, Eosinophils # (Auto) 

0.11, Basophils # (Auto) 0.03





3/30/18 17:15

















Test


  3/30/18


17:15


 


White Blood Count


  9.52 K/uL


(4.8-10.8)


 


Red Blood Count


  4.69 M/uL


(4.2-5.4)


 


Hemoglobin


  14.3 g/dL


(12.0-16.0)


 


Hematocrit 41.4 % (37-47) 


 


Mean Corpuscular Volume


  88.3 fL


()


 


Mean Corpuscular Hemoglobin


  30.5 pg


(25-34)


 


Mean Corpuscular Hemoglobin


Concent 34.5 g/dl


(32-36)


 


Platelet Count


  187 K/uL


(130-400)


 


Mean Platelet Volume


  9.3 fL


(7.4-10.4)


 


Neutrophils (%) (Auto) 73.5 % 


 


Lymphocytes (%) (Auto) 17.4 % 


 


Monocytes (%) (Auto) 7.4 % 


 


Eosinophils (%) (Auto) 1.2 % 


 


Basophils (%) (Auto) 0.3 % 


 


Neutrophils # (Auto)


  7.00 K/uL


(1.4-6.5)


 


Lymphocytes # (Auto)


  1.66 K/uL


(1.2-3.4)


 


Monocytes # (Auto)


  0.70 K/uL


(0.11-0.59)


 


Eosinophils # (Auto)


  0.11 K/uL


(0-0.5)


 


Basophils # (Auto)


  0.03 K/uL


(0-0.2)


 


RDW Standard Deviation


  49.6 fL


(36.4-46.3)


 


RDW Coefficient of Variation


  15.4 %


(11.5-14.5)


 


Immature Granulocyte % (Auto) 0.2 % 


 


Immature Granulocyte # (Auto)


  0.02 K/uL


(0.00-0.02)


 


Prothrombin Time


  23.0 SECONDS


(9.0-12.0)


 


Prothromb Time International


Ratio 2.2 (0.9-1.1) 


 


 


Activated Partial


Thromboplast Time 38.5 SECONDS


(21.0-31.0)


 


Partial Thromboplastin Ratio 1.5 


 


Urine Color YELLOW 


 


Urine Appearance CLEAR (CLEAR) 


 


Urine pH 5.0 (4.5-7.5) 


 


Urine Specific Gravity


  1.014


(1.000-1.030)


 


Urine Protein NEG (NEG) 


 


Urine Glucose (UA) NEG (NEG) 


 


Urine Ketones NEG (NEG) 


 


Urine Occult Blood NEG (NEG) 


 


Urine Nitrite NEG (NEG) 


 


Urine Bilirubin NEG (NEG) 


 


Urine Urobilinogen NEG (NEG) 


 


Urine Leukocyte Esterase SMALL (NEG) 


 


Urine WBC (Auto)


  5-10 /hpf


(0-5)


 


Urine RBC (Auto) 0-4 /hpf (0-4) 


 


Urine Hyaline Casts (Auto) 0 /lpf (0-5) 


 


Urine Epithelial Cells (Auto)


  10-20 /lpf


(0-5)


 


Urine Bacteria (Auto) NEG (NEG) 


 


Anion Gap


  9.0 mmol/L


(3-11)


 


Est Creatinine Clear Calc


Drug Dose 52.8 ml/min 


 


 


Estimated GFR (


American) 63.5 


 


 


Estimated GFR (Non-


American 54.8 


 


 


BUN/Creatinine Ratio 14.1 (10-20) 


 


Calcium Level


  8.9 mg/dl


(8.5-10.1)


 


Total Bilirubin


  1.1 mg/dl


(0.2-1)


 


Direct Bilirubin


  0.2 mg/dl


(0-0.2)


 


Aspartate Amino Transf


(AST/SGOT) 17 U/L (15-37) 


 


 


Alanine Aminotransferase


(ALT/SGPT) 18 U/L (12-78) 


 


 


Alkaline Phosphatase


  107 U/L


()


 


Troponin I


  < 0.015 ng/ml


(0-0.045)


 


Total Protein


  7.6 gm/dl


(6.4-8.2)


 


Albumin


  3.7 gm/dl


(3.4-5.0)


 


Lipase


  120 U/L


()





Laboratory results as reviewed by me.





Medications Administered











 Medications


  (Trade)  Dose


 Ordered  Sig/Hanane


 Route  Start Time


 Stop Time Status Last Admin


Dose Admin


 


 Acetaminophen/


 Codeine Phosphate


  (Tylenol w/


 Codeine #3 Tab)  1 tab  NOW  ONCE


 PO  3/30/18 17:00


 3/30/18 17:01 DC 3/30/18 17:06


1 TAB











ECG Per My Interpretation


Indication:  abdominal pain


Rate (beats per minute):  98


Rhythm:  atrial fibrillation


Findings:  PVC, other (No ST elevation)





ED Course


1640: The patient was evaluated in room C8. A complete history and physical 

exam was performed.





1649: Ordered Zofran 4 mg IV 





1700: Ordered Acetaminophen/Codeine Phosphate 1 tab PO





Medical Decision


This is an 82-year-old female who presents with right knee pain and right upper 

quadrant abdominal pain.  Differential diagnosis includes hemarthrosis, effusion

, arthritis, cholelithiasis, cholecystitis, pink otitis, duodenitis.  I did 

perform a limited focused review of portions of the patient's old chart on the 

electronic medical record. The patient was seen March 28, 2018 for right knee 

pain after a fall. She had an XR of her hip, knee and pelvis, which showed no 

fractures.





I did evaluate the patient as noted above.  The patient is presenting right 

knee pain.  She does appear to have an effusion to her knee.  I am concerned 

this may be hemarthrosis given her being on Coumadin.  She does not appear to 

have a septic knee.  She does have full range of motion of the knee.  There is 

no significant increase in temperature or erythema to the joint.  In any case 

she is on Coumadin and we could not perform arthrocentesis.  She also 

complained of right upper quadrant abdominal pain which she has had for 4 

weeks.  IV access was established.  The patient was placed on a continuous 

cardiac monitor.  I did order and personally review the patient's 12-lead EKG 

and chest x-ray as described above.  I did order and review the patient's blood 

work as noted in the electronic medical record.  Her INR is 2.2.  LFTs and 

lipase are unremarkable.  I did order an ultrasound of the right upper 

quadrant.  I did review the images myself as well as the radiology report as 

described above.  There is no evidence of gallstones or acute abnormality.  The 

patient was informed of her test results.  She was advised to follow-up with 

her doctor on Monday.  She did initially ask for a prescription for Tylenol 

with codeine.  I did look her up in the state database and appears that she 

filled a prescription for Tylenol with codeine No. 3 on March 29 quantity 90 

tablets.  I did ask her about this and she stated that her caregiver probably 

picked it up and that she would not need any pain meds.  She was discharged in 

good condition.





Medication Reconcilliation


Current Medication List:  was personally reviewed by me





Blood Pressure Screening


Patient's blood pressure:  Elevated blood pressure


Blood pressure disposition:  Referred to PCP





Impression





 Primary Impression:  


 Hemarthrosis, right knee


 Additional Impressions:  


 Right upper quadrant abdominal pain


 Anticoagulated on Coumadin





Scribe Attestation


The scribe's documentation has been prepared under my direct and personally 

reviewed by me in its entirety. I confirm that the note above accurately 

reflects all work, treatment, procedures, and medical decision making performed 

by me.





Departure Information


Dispostion


Home / Self-Care





Referrals


Keon Gross M.D. (PCP)





Patient Instructions


My [facility]





Problem Qualifiers

## 2018-04-02 ENCOUNTER — HOSPITAL ENCOUNTER (OUTPATIENT)
Dept: HOSPITAL 45 - C.LAB | Age: 83
Discharge: HOME | End: 2018-04-02
Attending: SURGERY
Payer: COMMERCIAL

## 2018-04-02 VITALS
HEIGHT: 65.98 IN | BODY MASS INDEX: 32.21 KG/M2 | WEIGHT: 200.42 LBS | BODY MASS INDEX: 32.21 KG/M2 | HEIGHT: 65.98 IN | WEIGHT: 200.42 LBS

## 2018-04-02 DIAGNOSIS — I71.4: Primary | ICD-10-CM

## 2018-04-02 LAB
BASOPHILS # BLD: 0.01 K/UL (ref 0–0.2)
BASOPHILS NFR BLD: 0.2 %
BUN SERPL-MCNC: 14 MG/DL (ref 7–18)
CALCIUM SERPL-MCNC: 9 MG/DL (ref 8.5–10.1)
CO2 SERPL-SCNC: 29 MMOL/L (ref 21–32)
CREAT SERPL-MCNC: 0.81 MG/DL (ref 0.6–1.2)
EOS ABS #: 0.14 K/UL (ref 0–0.5)
EOSINOPHIL NFR BLD AUTO: 211 K/UL (ref 130–400)
GLUCOSE SERPL-MCNC: 102 MG/DL (ref 70–99)
HCT VFR BLD CALC: 39.3 % (ref 37–47)
HGB BLD-MCNC: 13.2 G/DL (ref 12–16)
IG#: 0.01 K/UL (ref 0–0.02)
IMM GRANULOCYTES NFR BLD AUTO: 26.7 %
INR PPP: 1.1 (ref 0.9–1.1)
LYMPHOCYTES # BLD: 1.49 K/UL (ref 1.2–3.4)
MCH RBC QN AUTO: 29.7 PG (ref 25–34)
MCHC RBC AUTO-ENTMCNC: 33.6 G/DL (ref 32–36)
MCV RBC AUTO: 88.5 FL (ref 80–100)
MONO ABS #: 0.46 K/UL (ref 0.11–0.59)
MONOCYTES NFR BLD: 8.2 %
NEUT ABS #: 3.47 K/UL (ref 1.4–6.5)
NEUTROPHILS # BLD AUTO: 2.5 %
NEUTROPHILS NFR BLD AUTO: 62.2 %
PMV BLD AUTO: 9.6 FL (ref 7.4–10.4)
POTASSIUM SERPL-SCNC: 4.1 MMOL/L (ref 3.5–5.1)
PTT PATIENT: 28.5 SECONDS (ref 21–31)
RED CELL DISTRIBUTION WIDTH CV: 14.9 % (ref 11.5–14.5)
RED CELL DISTRIBUTION WIDTH SD: 48.6 FL (ref 36.4–46.3)
SODIUM SERPL-SCNC: 135 MMOL/L (ref 136–145)
WBC # BLD AUTO: 5.58 K/UL (ref 4.8–10.8)

## 2018-04-02 NOTE — PAT MEDICATION INSTRUCTIONS
Service Date


Apr 2, 2018.





Current Home Medication List


Acetaminophen/Codeine (Tylenol W/Codeine #3), 1 TAB PO Q6H PRN for N


Ascorbic Acid (Vitamin C), 1 TAB PO QAM


Azelastine Hcl (Astelin Nasal Spray), 2 SPRAYS DIEGO DAILY


Carboxymethylcellulose Sodium (Sterile Lubricant Drops), 1 DROP OPB PRN


Cholecalciferol (Vitamin D3), 1 TAB PO QAM


Diphenhydramine Hcl (Benadryl Allergy), 1 CAP PO HS


Epinephrine (Epipen), 0.3 MG IM UD PRN for ALLERGIC REACTION


Fluticasone Propionate (Nasal) (Flonase Allergy Relief), 1 SPRAY DIEGO DAILY


Guaifenesin Ext Rel (Mucinex Ext Rel), 600 MG PO Q12 PRN for prn


Hydrochlorothiazide (Hctz), 12.5 MG PO QAM


Loratadine (Claritin), 10 MG PO QAM


Polyethylene Glycol 3350 (Miralax), 17 GM PO QPM PRN for PRN


Psyllium (Metamucil), 1 DOSE PO QPM PRN for PRN


Timolol Maleate (Ophth) (Timoptic 0.25% Oph), 1 DROP OPB QAM


Triamcinolone Acet (Triamcinolone Acetonide), 1 APPLN TOP BID PRN for prn


Verapamil Hcl (Calan Sr Ext Rel), 180 MG PO UD


Warfarin Sod (Jantoven), 5 MG PO AS DIRECTED


[Calcium D], 1 TAB PO QAM


[Clotrimazole 1%], 1 DOSE TOP BID





Medication Instructions


For Your Scheduled Surgery 





-Continue as directed:


Epinephrine (Epipen), 0.3 MG IM UD PRN for ALLERGIC REACTION


Verapamil Hcl (Calan Sr Ext Rel), 180 MG PO UD








-Contact your surgeon and cardiologist for instructions for:


Warfarin Sod (Jantoven), 5 MG PO AS DIRECTED








- Hold the following medications 24 hours prior to surgery:


Triamcinolone Acet (Triamcinolone Acetonide), 1 APPLN TOP BID PRN for prn


[Clotrimazole 1%], 1 DOSE TOP BID








- Hold the following medications the morning of surgery:


Ascorbic Acid (Vitamin C), 1 TAB PO QAM


Cholecalciferol (Vitamin D3), 1 TAB PO QAM


Guaifenesin Ext Rel (Mucinex Ext Rel), 600 MG PO Q12 PRN for prn


Hydrochlorothiazide (Hctz), 12.5 MG PO QAM


Loratadine (Claritin), 10 MG PO QAM


Polyethylene Glycol 3350 (Miralax), 17 GM PO QPM PRN for PRN


Psyllium (Metamucil), 1 DOSE PO QPM PRN for PRN


[Calcium D], 1 TAB PO QAM








- Take the following medications the morning of surgery with a sip of water:


Acetaminophen/Codeine (Tylenol W/Codeine #3), 1 TAB PO Q6H PRN (if needed, can 

be taken up to four hours before surgery)


Azelastine Hcl (Astelin Nasal Spray), 2 SPRAYS DIEGO DAILY


Carboxymethylcellulose Sodium (Sterile Lubricant Drops), 1 DROP OPB PRN (if 

needed, and bring with you to the hospital)


Fluticasone Propionate (Nasal) (Flonase Allergy Relief), 1 SPRAY DIEGO DAILY


Timolol Maleate (Ophth) (Timoptic 0.25% Oph), 1 DROP OPB QAM (bring these with 

you to the hospital)








- Take the following medications as scheduled the night before surgery:


Acetaminophen/Codeine (Tylenol W/Codeine #3), 1 TAB PO Q6H PRN (if needed)


Azelastine Hcl (Astelin Nasal Spray), 2 SPRAYS DIEGO DAILY


Carboxymethylcellulose Sodium (Sterile Lubricant Drops), 1 DROP OPB PRN (if 

needed)


Diphenhydramine Hcl (Benadryl Allergy), 1 CAP PO HS


Fluticasone Propionate (Nasal) (Flonase Allergy Relief), 1 SPRAY DIEGO DAILY 


Guaifenesin Ext Rel (Mucinex Ext Rel), 600 MG PO Q12 PRN for prn (if needed)


Polyethylene Glycol 3350 (Miralax), 17 GM PO QPM PRN for PRN (if needed)


Psyllium (Metamucil), 1 DOSE PO QPM PRN for PRN (if needed)








If you have any questions please call us at [phone] or [phone] or 

[phone]

## 2018-04-06 ENCOUNTER — HOSPITAL ENCOUNTER (OUTPATIENT)
Dept: HOSPITAL 45 - C.ULTRBC | Age: 83
Discharge: HOME | End: 2018-04-06
Attending: NURSE PRACTITIONER
Payer: COMMERCIAL

## 2018-04-06 DIAGNOSIS — M25.561: Primary | ICD-10-CM

## 2018-04-06 DIAGNOSIS — M25.461: ICD-10-CM
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
## 2018-04-06 DIAGNOSIS — S82.291A: ICD-10-CM

## 2018-04-06 NOTE — DIAGNOSTIC IMAGING REPORT
MRI OF THE RIGHT KNEE WITHOUT CONTRAST



CLINICAL HISTORY: Persistent right knee pain and swelling following fall in

March 28, 2018.    



COMPARISON STUDY:  Right knee radiograph March 28, 2018.



TECHNIQUE: Utilizing a 1.5 Goldie magnet and dedicated coil, multiplanar,

multiecho imaging of the right knee was performed without intravenous or

intraarticular contrast.



FINDINGS: Note is made of an acute nondisplaced fracture within the medial

tibial plateau without significant depression or displacement. This fracture

extends to the lateral aspect the medial tibial plateau. No additional acute

fractures are present. This fracture was occult by radiography. There is a large

right knee joint effusion which appears complex. This may contain a small amount

of hemorrhage. The cruciate and collateral ligaments are intact. There is severe

chondrosis within the medial compartment as well as moderate chondrosis within

the patellofemoral compartment and mild chondrosis within the lateral

compartment. The medial meniscus is diminutive. This suggests an old complex

tear. There is an oblique tear of the body and posterior horn of the medial

meniscus. No suspicious marrow replacement is present. Extensor mechanism is

intact. There is extensive muscular edema involving the popliteus and

gastrocnemius muscles. No associated hematoma is identified. 



IMPRESSION:  



1. Acute nondisplaced medial tibial plateau fracture without depression. 



2. Large right knee joint effusion which may contain a small amount of

hemorrhage related to the fracture.



3. Extensive muscular edema predominantly involving the gastrocnemius and

popliteus muscles which may reflect a muscle contusion.



4. Diminutive medial meniscus which favors an old complex tear. Previous partial

meniscectomy could appear similar. Oblique tear of the body and posterior horn

of the lateral meniscus.



5. 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

compartment chondrosis.







Electronically signed by:  Raz Steiner M.D.

4/6/2018 1:09 PM



Dictated Date/Time:  4/6/2018 12:38 PM

## 2018-04-06 NOTE — DIAGNOSTIC IMAGING REPORT
RIGHT LOWER EXTREMITY VENOUS DOPPLER



CLINICAL HISTORY: Right lower extremity pain.    



COMPARISON STUDY:  No previous studies for comparison. 



TECHNIQUE:  Sonography of the deep venous system of the right lower extremity

was performed. Compression and augmentation were evaluated.



FINDINGS:  The common femoral, superficial femoral and popliteal veins were

compressible. Augmentation was normal. Flow was shown within the deep calf

vessels.



IMPRESSION: No evidence of deep venous thrombus within the right lower

extremity.







Electronically signed by:  aRz Steiner M.D.

4/6/2018 11:30 AM



Dictated Date/Time:  4/6/2018 11:28 AM

## 2018-04-27 ENCOUNTER — HOSPITAL ENCOUNTER (OUTPATIENT)
Dept: HOSPITAL 45 - C.MAMM | Age: 83
Discharge: HOME | End: 2018-04-27
Attending: STUDENT IN AN ORGANIZED HEALTH CARE EDUCATION/TRAINING PROGRAM
Payer: COMMERCIAL

## 2018-04-27 DIAGNOSIS — Z12.31: Primary | ICD-10-CM

## 2018-04-27 DIAGNOSIS — Z85.3: ICD-10-CM

## 2018-04-30 NOTE — MAMMOGRAPHY REPORT
BILATERAL DIGITAL SCREENING MAMMOGRAM TOMOSYNTHESIS WITH CAD: 4/27/2018

CLINICAL HISTORY: Asymptomatic. Personal history of breast cancer.  





TECHNIQUE:  Breast tomosynthesis in addition to standard 2D mammography was performed. Current study 
was also evaluated with a Computer Aided Detection (CAD) system.  



COMPARISON: Comparison is made to exams dated:  5/10/2017 mammogram - [facility], 3
/2/2016 mammogram, 5/1/2015 mammogram, 3/10/2015 mammogram, 3/12/2014 mammogram, and 6/26/2013 mammog
lizzeth - [facility].   



BREAST COMPOSITION:  There are scattered areas of fibroglandular density in both breasts.  



FINDINGS:  No suspicious masses, calcifications, or areas of architectural distortion are noted in ei
ther breast. There has been no significant interval change compared to prior exams.  There are stable
 postsurgical changes in the left upper outer quadrant from prior lumpectomy, including stable densit
y, architectural distortion, and surgical clips at the lumpectomy bed.  A linear scar marker denotes 
a scar on the left upper outer breast.  Scattered bilateral benign-appearing calcifications are not s
ignificantly changed.





IMPRESSION:  ACR BI-RADS CATEGORY 2: BENIGN

There is no mammographic evidence of malignancy. A 1 year screening mammogram is recommended.  The pa
tient will receive written notification of the results.  





Approximately 10% of breast cancers are not detected with mammography. A negative mammographic report
 should not delay biopsy if a clinically suggestive mass is present.



Ginger Ritter M.D.          

/:4/27/2018 16:09:58  



Attending Technologist: Jadyn Wilder RT(R)(M), [facility]

Imaging Technologist: Luis Triplett RT(R)(M), [facility]

letter sent: Normal 1/2  

BI-RADS Code: ACR BI-RADS Category 2: Benign

## 2018-05-07 ENCOUNTER — HOSPITAL ENCOUNTER (OUTPATIENT)
Dept: HOSPITAL 45 - C.RDSM | Age: 83
Discharge: HOME | End: 2018-05-07
Attending: PHYSICIAN ASSISTANT
Payer: COMMERCIAL

## 2018-05-07 DIAGNOSIS — Z09: Primary | ICD-10-CM

## 2018-05-07 NOTE — DIAGNOSTIC IMAGING REPORT
R KNEE 4 OR MORE



CLINICAL HISTORY: 82 years-old Female presenting with RIGHT KNEE FX FOLLOW UP. 



TECHNIQUE: Frontal, sunrise, and lateral views of the right knee were obtained. 



COMPARISON: 4/16/2018.



FINDINGS:

Osteopenia. Severe joint space loss in the medial compartment with a

bone-on-bone or near bone-on-bone appearance. Subchondral sclerosis.

Osteophytosis evident in all 3 compartments. Allowing for osteopenia, no

displaced fracture is evident. Previously noted radiolucency along the medial

tibial plateau is not apparent. No large knee joint effusion. Atherosclerosis

noted.



IMPRESSION:

1.  Previously noted medial tibial plateau fracture line is not apparent on the

current radiograph.



2.  Tricompartmental degenerative changes most severe in the medial compartment

with a bone-on-bone appearance.







Electronically signed by:  Keon Tong M.D.

5/7/2018 11:45 AM



Dictated Date/Time:  5/7/2018 11:43 AM

## 2018-05-13 ENCOUNTER — HOSPITAL ENCOUNTER (INPATIENT)
Dept: HOSPITAL 45 - C.EDA | Age: 83
LOS: 4 days | Discharge: NURSING, REHAB OR LONG-TERM CARE | DRG: 543 | End: 2018-05-17
Attending: HOSPITALIST | Admitting: HOSPITALIST
Payer: COMMERCIAL

## 2018-05-13 VITALS — OXYGEN SATURATION: 92 % | SYSTOLIC BLOOD PRESSURE: 166 MMHG | TEMPERATURE: 97.52 F | DIASTOLIC BLOOD PRESSURE: 95 MMHG

## 2018-05-13 VITALS
WEIGHT: 216.93 LBS | HEIGHT: 66 IN | BODY MASS INDEX: 34.86 KG/M2 | HEIGHT: 66 IN | WEIGHT: 216.93 LBS | BODY MASS INDEX: 34.86 KG/M2

## 2018-05-13 DIAGNOSIS — Z96.643: ICD-10-CM

## 2018-05-13 DIAGNOSIS — Z96.653: ICD-10-CM

## 2018-05-13 DIAGNOSIS — I48.1: ICD-10-CM

## 2018-05-13 DIAGNOSIS — Z88.1: ICD-10-CM

## 2018-05-13 DIAGNOSIS — Z79.01: ICD-10-CM

## 2018-05-13 DIAGNOSIS — Z96.652: ICD-10-CM

## 2018-05-13 DIAGNOSIS — Z85.3: ICD-10-CM

## 2018-05-13 DIAGNOSIS — Z87.891: ICD-10-CM

## 2018-05-13 DIAGNOSIS — I10: ICD-10-CM

## 2018-05-13 DIAGNOSIS — Z88.6: ICD-10-CM

## 2018-05-13 DIAGNOSIS — M84.421A: Primary | ICD-10-CM

## 2018-05-13 DIAGNOSIS — G47.33: ICD-10-CM

## 2018-05-13 DIAGNOSIS — E78.5: ICD-10-CM

## 2018-05-13 LAB
BASOPHILS # BLD: 0.02 K/UL (ref 0–0.2)
BASOPHILS NFR BLD: 0.2 %
BUN SERPL-MCNC: 15 MG/DL (ref 7–18)
CALCIUM SERPL-MCNC: 8.6 MG/DL (ref 8.5–10.1)
CO2 SERPL-SCNC: 26 MMOL/L (ref 21–32)
CREAT SERPL-MCNC: 0.91 MG/DL (ref 0.6–1.2)
EOS ABS #: 0.15 K/UL (ref 0–0.5)
EOSINOPHIL NFR BLD AUTO: 211 K/UL (ref 130–400)
GLUCOSE SERPL-MCNC: 130 MG/DL (ref 70–99)
HCT VFR BLD CALC: 42.2 % (ref 37–47)
HGB BLD-MCNC: 14.7 G/DL (ref 12–16)
IG#: 0.02 K/UL (ref 0–0.02)
IMM GRANULOCYTES NFR BLD AUTO: 16 %
INR PPP: 2.4 (ref 0.9–1.1)
LYMPHOCYTES # BLD: 1.39 K/UL (ref 1.2–3.4)
MCH RBC QN AUTO: 30.6 PG (ref 25–34)
MCHC RBC AUTO-ENTMCNC: 34.8 G/DL (ref 32–36)
MCV RBC AUTO: 87.7 FL (ref 80–100)
MONO ABS #: 0.49 K/UL (ref 0.11–0.59)
MONOCYTES NFR BLD: 5.6 %
NEUT ABS #: 6.63 K/UL (ref 1.4–6.5)
NEUTROPHILS # BLD AUTO: 1.7 %
NEUTROPHILS NFR BLD AUTO: 76.3 %
PMV BLD AUTO: 9.1 FL (ref 7.4–10.4)
POTASSIUM SERPL-SCNC: 3.9 MMOL/L (ref 3.5–5.1)
PTT PATIENT: 35.6 SECONDS (ref 21–31)
RED CELL DISTRIBUTION WIDTH CV: 14 % (ref 11.5–14.5)
RED CELL DISTRIBUTION WIDTH SD: 45.1 FL (ref 36.4–46.3)
SODIUM SERPL-SCNC: 132 MMOL/L (ref 136–145)
WBC # BLD AUTO: 8.7 K/UL (ref 4.8–10.8)

## 2018-05-13 NOTE — DIAGNOSTIC IMAGING REPORT
R HUMERUS MIN 2 VIEWS ROUTINE, R FOREARM 2 VIEWS ROUTINE



CLINICAL HISTORY: trip fall right shoulder pain   



COMPARISON STUDY:  None.



FINDINGS: There is an impacted right humeral neck fracture. There is mild medial

displacement of the humeral head in relation to the humeral shaft. No

dislocation within the right shoulder. The distal humerus, radius, and ulna are

intact. Mild soft tissue swelling within the forearm. The bones are osteopenic.



IMPRESSION:  1. An impacted right humeral neck fracture.

2. No fractures within the right radius or ulna. 









Electronically signed by:  Delroy Mehta M.D.

5/13/2018 9:37 PM



Dictated Date/Time:  5/13/2018 9:34 PM

## 2018-05-13 NOTE — EMERGENCY ROOM VISIT NOTE
History


Report prepared by Onofre:  Marcos Jay


Under the Supervision of:  Dr. Lior Gutiérrez M.D.


First contact with patient:  20:30


Chief Complaint:  ARM PAIN


Stated Complaint:  FALL/RT. ARM PAIN





History of Present Illness


The patient is a 82 year old female who presents to the Emergency Room via EMS 

due to a fall that occurred an hour and a half ago. Patient states that she 

tripped. She states that the pain is mostly in her right shoulder but states 

that it radiates down her whole arm. She describes the pain as severe. Patient 

states that she was unable to get up after the fall and was lying on the ground 

awaiting EMS to arrive. Patient denies hitting her head or getting knocked out 

from the fall. Patient was given pain medications in ambulance which did not 

relieve the symptoms. Patient states that she takes Tylenol for pain. She 

states that she does not take regular pain medications. Pertinent past surgical 

history includes a knee replacement and 2 hip replacements. Patient denies neck 

pain.





   Source of History:  patient


   Onset:  Hour and a half ago


   Position:  shoulder (right), arm (right)


   Symptom Intensity:  severe


   Modifying Factors (Relieving):  other (None)


   Associated Symptoms:  No neck pain





Review of Systems


See HPI for pertinent positives & negatives. A total of 10 systems reviewed and 

were otherwise negative.





Past Medical & Surgical


Medical Problems:


(1) Benign hypertension


(2) breast cancer


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


(4) Humerus fracture


(5) idiopathic angioedema 


(6) Replacement of total knee joint


(7) Sleep apnea








Family History





No pertinent family history





Social History


Smoking Status:  Former Smoker


Drug Use:  none


Marital Status:  


Housing Status:  lives alone


Occupation Status:  retired





Current/Historical Medications


Scheduled


Azelastine Hcl (Astelin Nasal Spray), 2 SPRAYS DIEGO DAILY


Calcium Carbonate-Vitamin D W/ (Caltrate 600 Plus), 1 TAB PO DAILY


Carboxymethylcellulose Sodium (Sterile Lubricant Drops), 1 DROP OPB PRN


Cholecalciferol (Vitamin D3), 2 TAB PO QAM


Diphenhydramine Hcl (Benadryl Allergy), 1 CAP PO HS


Fluticasone Propionate (Nasal) (Flonase Allergy Relief), 1 SPRAY DIEGO DAILY


Hydrochlorothiazide (Hctz), 12.5 MG PO QAM


Loratadine (Claritin), 10 MG PO QAM


Timolol Maleate (Ophth) (Timoptic 0.25% Oph), 1 DROP OPB QAM


Verapamil Hcl (Calan Sr Ext Rel), 180 MG PO UD


Warfarin Sodium (Coumadin), 5 MG PO 4XWK


Warfarin Sodium (Coumadin), 7.5 MG PO MWF


[Clotrimazole 1%], 1 DOSE TOP BID





Scheduled PRN


Acetaminophen/Codeine (Tylenol W/Codeine #3), 1 TAB PO Q6H PRN for N


Ascorbic Acid (Vitamin C), 1 TAB PO QAM PRN for


Epinephrine (Epipen), 0.3 MG IM UD PRN for ALLERGIC REACTION


Guaifenesin Ext Rel (Mucinex Ext Rel), 600 MG PO Q12 PRN for prn


Polyethylene Glycol 3350 (Miralax), 17 GM PO QPM PRN for PRN


Psyllium (Metamucil), 1 DOSE PO QPM PRN for PRN


Triamcinolone Acet (Triamcinolone Acetonide), 1 APPLN TOP BID PRN for prn





Allergies


Coded Allergies:  


     Aspirin (Verified  Allergy, Severe, TONGUE AND THROAT SWELLS, 4/2/18)


     Tramadol (Verified  Allergy, Severe, ANAPHYLAXIS, 4/2/18)


     Monosodium Glutamate (Verified  Allergy, Mild, throat swell, 4/2/18)


     Aromatic Oils (Verified  Allergy, Unknown, PERFUMES CAN'T BREATHE, 4/2/18)


     Erythromycin (Verified  Allergy, Unknown, SWELLING THROAT AND RASH, 4/2/18)


     Tetracycline (Verified  Allergy, Unknown, SWELLING AND RASH, 4/2/18)


     Cephalexin (Verified  Adverse Reaction, Severe, VOMITING, 4/5/18)


     Omeprazole (Verified  Adverse Reaction, Severe, G I UPSET, 4/5/18)


     Amoxicillin (Verified  Adverse Reaction, Intermediate, VOMIT, 4/5/18)


     Clavulanic Acid (Verified  Adverse Reaction, Intermediate, VOMIT, 4/5/18)


     Metoprolol (Verified  Adverse Reaction, Intermediate, DISORIENTED, 4/5/18)


     Alendronate (Verified  Adverse Reaction, Unknown, very upset stomach, 4/2/ 18)


     Esomeprazole (Verified  Adverse Reaction, Unknown, G I UPSET, 4/5/18)


     Nystatin (Verified  Adverse Reaction, Unknown, GI UPSET, 4/5/18)





Physical Exam


Vital Signs











  Date Time  Temp Pulse Resp B/P (MAP) Pulse Ox O2 Delivery O2 Flow Rate FiO2


 


5/13/18 21:55  74 22 180/89 96 Nasal Cannula 2.0 


 


5/13/18 20:33 36.5 78 24 171/113 94 Room Air  











Physical Exam


GENERAL: Patient is very uncomfortable appearing and in moderate/severe 

distress.


EYES: No scleral icterus, unremarkable pupils.


ENT: Mucous membranes moist, no nasal congestion.


NECK: No masses appreciated, no meningismus, trachea is midline.


RESPIRATORY: No dyspnea. Clear to auscultation and equal bilaterally. No wheeze

, no rhonchi.


CARDIOVASCULAR: Regular rate and rhythm.  No murmurs, rubs, gallops appreciated.


GASTROINTESTINAL: Abdomen soft, nontender, no peritonitis.  Bowel sounds 

positive.  No masses appreciated.


BACK: No midline tenderness, no CVA tenderness


EXTREMITIES: Patient is splinting right arm, distally NV intact; tenderness to 

palpitation of entire right shoulder, mid humerus, mid forearm; lack of range 

of motion of arm or shoulder due to amount of pain otherwise normal motion all 

extremities, no cyanosis, no edema.


NEUROLOGIC: Alert and oriented, no acute motor or sensory deficits, no focal 

weakness, cranial nerves grossly intact.


SKIN: No rash, no jaundice, no diaphoresis.





Medical Decision & Procedures


ER Provider


Diagnostic Interpretation:


Radiology results and stated below per my review and radiologist interpretation:





R HUMERUS MIN 2 VIEWS ROUTINE, R FOREARM 2 VIEWS ROUTINE





CLINICAL HISTORY: trip fall right shoulder pain   





COMPARISON STUDY:  None.





FINDINGS: There is an impacted right humeral neck fracture. There is mild medial


displacement of the humeral head in relation to the humeral shaft. No


dislocation within the right shoulder. The distal humerus, radius, and ulna are


intact. Mild soft tissue swelling within the forearm. The bones are osteopenic.





IMPRESSION:  1. An impacted right humeral neck fracture.


2. No fractures within the right radius or ulna. 





Electronically signed by:  Delroy Mehta M.D.


5/13/2018 9:37 PM





CHEST ONE VIEW PORTABLE





HISTORY:      trip and fall





COMPARISON: Chest 3/30/2018.





FINDINGS: No pneumothorax. No pleural effusions. The heart remains enlarged.


There is mild central pulmonary vascular congestion. No focal lung


consolidations to suggest pneumonia. A few linear density at the left lung base


favor subsegmental atelectasis. The patient is rotated on this study. There is


an impacted right humeral neck fracture.





IMPRESSION:





1. Impacted right humeral neck fracture.


2. Cardiomegaly and mild pulmonary vascular congestion.





Electronically signed by:  Delroy Mehta M.D.


5/13/2018 9:34 PM





Laboratory Results


5/13/18 21:00








Red Blood Count 4.81, Mean Corpuscular Volume 87.7, Mean Corpuscular Hemoglobin 

30.6, Mean Corpuscular Hemoglobin Concent 34.8, Mean Platelet Volume 9.1, 

Neutrophils (%) (Auto) 76.3, Lymphocytes (%) (Auto) 16.0, Monocytes (%) (Auto) 

5.6, Eosinophils (%) (Auto) 1.7, Basophils (%) (Auto) 0.2, Neutrophils # (Auto) 

6.63, Lymphocytes # (Auto) 1.39, Monocytes # (Auto) 0.49, Eosinophils # (Auto) 

0.15, Basophils # (Auto) 0.02





5/13/18 21:00

















Test


  5/13/18


21:00 5/13/18


21:35


 


White Blood Count


  8.70 K/uL


(4.8-10.8) 


 


 


Red Blood Count


  4.81 M/uL


(4.2-5.4) 


 


 


Hemoglobin


  14.7 g/dL


(12.0-16.0) 


 


 


Hematocrit 42.2 % (37-47)  


 


Mean Corpuscular Volume


  87.7 fL


() 


 


 


Mean Corpuscular Hemoglobin


  30.6 pg


(25-34) 


 


 


Mean Corpuscular Hemoglobin


Concent 34.8 g/dl


(32-36) 


 


 


Platelet Count


  211 K/uL


(130-400) 


 


 


Mean Platelet Volume


  9.1 fL


(7.4-10.4) 


 


 


Neutrophils (%) (Auto) 76.3 %  


 


Lymphocytes (%) (Auto) 16.0 %  


 


Monocytes (%) (Auto) 5.6 %  


 


Eosinophils (%) (Auto) 1.7 %  


 


Basophils (%) (Auto) 0.2 %  


 


Neutrophils # (Auto)


  6.63 K/uL


(1.4-6.5) 


 


 


Lymphocytes # (Auto)


  1.39 K/uL


(1.2-3.4) 


 


 


Monocytes # (Auto)


  0.49 K/uL


(0.11-0.59) 


 


 


Eosinophils # (Auto)


  0.15 K/uL


(0-0.5) 


 


 


Basophils # (Auto)


  0.02 K/uL


(0-0.2) 


 


 


RDW Standard Deviation


  45.1 fL


(36.4-46.3) 


 


 


RDW Coefficient of Variation


  14.0 %


(11.5-14.5) 


 


 


Immature Granulocyte % (Auto) 0.2 %  


 


Immature Granulocyte # (Auto)


  0.02 K/uL


(0.00-0.02) 


 


 


Anion Gap


  8.0 mmol/L


(3-11) 


 


 


Est Creatinine Clear Calc


Drug Dose 57.3 ml/min 


  


 


 


Estimated GFR (


American) 68.6 


  


 


 


Estimated GFR (Non-


American 59.2 


  


 


 


BUN/Creatinine Ratio 16.8 (10-20)  


 


Calcium Level


  8.6 mg/dl


(8.5-10.1) 


 


 


Magnesium Level


  2.1 mg/dl


(1.8-2.4) 


 


 


Activated Partial


Thromboplast Time 


  35.6 SECONDS


(21.0-31.0)


 


Partial Thromboplastin Ratio  1.4 





Laboratory results as reviewed by me.





Medications Administered











 Medications


  (Trade)  Dose


 Ordered  Sig/Hanane


 Route  Start Time


 Stop Time Status Last Admin


Dose Admin


 


 Fentanyl Citrate


  (Fentanyl Inj)  100 mcg  NOW  STAT


 IV  5/13/18 20:41


 5/13/18 20:42 DC 5/13/18 20:50


100 MCG


 


 Sodium Chloride  1,000 ml @ 


 75 mls/hr  [id number] STAT


 IV  5/13/18 20:42


 5/13/18 23:07 DC 5/13/18 20:50


75 MLS/HR


 


 Hydromorphone HCl


  (Dilaudid Inj)  2 mg  NOW  STAT


 IV  5/13/18 21:28


 5/13/18 21:29 DC 5/13/18 21:32


2 MG


 


 Morphine Sulfate


  (MoRPHine


 SULFATE INJ)  1 mg  Q2HWA  PRN


 IV  5/13/18 22:15


 5/27/18 22:14  5/14/18 16:37


1 MG











ED Course


2033: The patient was evaluated in room A10. A complete history and physical 

exam was performed.





2128: Patient has contractile pain. She is requesting further narcotics.





2155: Patient notes her pain is better. She is still unable to move or sit up 

in bed without severe pain. I paged Jeromy Wong.





2206: I spoke with Dr. Mehta of the [facility] about the patient's radiology 

findings.





2315: Upon reevaluation, the patient will be further evaluated. Discussed 

results and treatment plan with the patient. She verbalized understanding and 

agreement with the treatment plan. The patient will be evaluated for further 

management.





Medical Decision


Differential: Fracture, Dislocation, Cellulitis, Septic Joint, Ligamentous 

Injury, Effusion, DVT, amongst other pathologies entertained.





82 yr old female with mechanical trip and fall landing on right arm resulting 

in xray confirmed proximal humerus fracture with impaction.  No TTP of cervical 

spine, no headache and denies hitting head/neck.  She is on Coumadin but given 

she has normal neuro exam and without ICH symptoms at this time will hold on CT 

given she is being brought in for further management.  Labs unremarkable.  CXR 

looks OK other than fracture.  She has required massive doses of narcotics to 

control pain, though I see no evidence compartment syndrome at this time.  

Given amount of pain she is having and likelihood I suspect she will need rehab 

placement, I have consulted hospitalist for further management of patient.





Head Trauma


GCS Score:  15





Medication Reconcilliation


Current Medication List:  was personally reviewed by me





Blood Pressure Screening


Patient's blood pressure:  Elevated blood pressure


Blood pressure disposition:  Elevated BP felt to be situational





Consults


Time Called:  2251


Consulting Physician:  Dr. Kenney - [facility]


Returned Call:  [phone]


Discussed the patient's case. The patient will be evaluated for further 

treatment and disposition.





Impression





 Primary Impression:  


 Fracture of right humerus





Scribe Attestation


The scribe's documentation has been prepared under my direction and personally 

reviewed by me in its entirety. I confirm that the note above accurately 

reflects all work, treatment, procedures, and medical decision making performed 

by me.





Departure Information


Dispostion


Being Evaluated By Hospitalist





Referrals


Keon Gross M.D. (PCP)





Forms


HOME CARE DOCUMENTATION FORM,                                                 

               IMPORTANT VISIT INFORMATION





Patient Instructions


My [facility]

## 2018-05-13 NOTE — DIAGNOSTIC IMAGING REPORT
CHEST ONE VIEW PORTABLE



HISTORY:      trip and fall



COMPARISON: Chest 3/30/2018.



FINDINGS: No pneumothorax. No pleural effusions. The heart remains enlarged.

There is mild central pulmonary vascular congestion. No focal lung

consolidations to suggest pneumonia. A few linear density at the left lung base

favor subsegmental atelectasis. The patient is rotated on this study. There is

an impacted right humeral neck fracture.



IMPRESSION:



1. Impacted right humeral neck fracture.

2. Cardiomegaly and mild pulmonary vascular congestion.







Electronically signed by:  Delroy Mehta M.D.

5/13/2018 9:34 PM



Dictated Date/Time:  5/13/2018 9:31 PM

## 2018-05-13 NOTE — HISTORY AND PHYSICAL
History & Physical


Date & Time of Service:


May 13, 2018 at 22:52


Chief Complaint:


Fall/Rt. Arm Pain


Primary Care Physician:


Julio Cesar Golden M.D.


History of Present Illness


Source:  patient


83yo female presenting after mechanical fall.  Patient states that around 19:00 

today she tripped on a rug at home leading to a fall on her right shoulder.  

She denies head trauma/LOC, denies CP/palpitations/SOB/dizziness, denies urinary

/fecal incontinence or evidence of seizure activity.  Patient was brought to 

the ER.  X-ray revealed an impacted right humeral neck fracture with no 

fractures within the right radius or ulna. Patient requiring large doses of 

pain medications, Fentanyl 100mcg and Dilaudid 2mg.  She is still experiencing 

severe arm pain 4-5/10 when sitting still and 10/10 with any movement or touch.

  





ER Course:  Dilaudid 2mg IV, NSS x 1 L, Fentanyl 100mcg, Zofran





Past Medical/Surgical History


Medical Problems:


1.  Idiopathic angioedema


2.  HTN


3.  JANEY


4. Breast CA


5. Osteoarthritis


6. Hyperlipidemia


7. Aortic aneurysm





Past Surgical:


1. Left breast lumpectomy


2. Left hip


3. Left knee








Family History





No pertinent family history





Social History


Smoking Status:  Unknown if Ever Smoked


Alcohol Use:  occasionally


Drug Use:  none


Marital Status:  


Housing status:  lives alone


Occupational Status:  retired





Immunizations


History of Influenza Vaccine:  Yes


History of Tetanus Vaccine?:  Unknown


History of Pneumococcal:  Yes


History of Hepatitis B Vaccine:  Unknown





Allergies


Coded Allergies:  


     Aspirin (Verified  Allergy, Severe, TONGUE AND THROAT SWELLS, 4/2/18)


     Tramadol (Verified  Allergy, Severe, ANAPHYLAXIS, 4/2/18)


     Monosodium Glutamate (Verified  Allergy, Mild, throat swell, 4/2/18)


     Aromatic Oils (Verified  Allergy, Unknown, PERFUMES CAN'T BREATHE, 4/2/18)


     Erythromycin (Verified  Allergy, Unknown, SWELLING THROAT AND RASH, 4/2/18)


     Tetracycline (Verified  Allergy, Unknown, SWELLING AND RASH, 4/2/18)


     Cephalexin (Verified  Adverse Reaction, Severe, VOMITING, 4/5/18)


     Omeprazole (Verified  Adverse Reaction, Severe, G I UPSET, 4/5/18)


     Amoxicillin (Verified  Adverse Reaction, Intermediate, VOMIT, 4/5/18)


     Clavulanic Acid (Verified  Adverse Reaction, Intermediate, VOMIT, 4/5/18)


     Metoprolol (Verified  Adverse Reaction, Intermediate, DISORIENTED, 4/5/18)


     Alendronate (Verified  Adverse Reaction, Unknown, very upset stomach, 4/2/ 18)


     Esomeprazole (Verified  Adverse Reaction, Unknown, G I UPSET, 4/5/18)


     Nystatin (Verified  Adverse Reaction, Unknown, GI UPSET, 4/5/18)





Home Medications


Scheduled


Azelastine Hcl (Astelin Nasal Spray), 2 SPRAYS DIEGO DAILY


Calcium Carbonate-Vitamin D W/ (Caltrate 600 Plus), 1 TAB PO DAILY


Carboxymethylcellulose Sodium (Sterile Lubricant Drops), 1 DROP OPB PRN


Cholecalciferol (Vitamin D3), 2 TAB PO QAM


Diphenhydramine Hcl (Benadryl Allergy), 1 CAP PO HS


Fluticasone Propionate (Nasal) (Flonase Allergy Relief), 1 SPRAY DIEGO DAILY


Hydrochlorothiazide (Hctz), 12.5 MG PO QAM


Loratadine (Claritin), 10 MG PO QAM


Timolol Maleate (Ophth) (Timoptic 0.25% Oph), 1 DROP OPB QAM


Verapamil Hcl (Calan Sr Ext Rel), 180 MG PO UD


Warfarin Sodium (Coumadin), 5 MG PO 4XWK


Warfarin Sodium (Coumadin), 7.5 MG PO MWF


[Clotrimazole 1%], 1 DOSE TOP BID





Scheduled PRN


Acetaminophen/Codeine (Tylenol W/Codeine #3), 1 TAB PO Q6H PRN for N


Ascorbic Acid (Vitamin C), 1 TAB PO QAM PRN for


Epinephrine (Epipen), 0.3 MG IM UD PRN for ALLERGIC REACTION


Guaifenesin Ext Rel (Mucinex Ext Rel), 600 MG PO Q12 PRN for prn


Polyethylene Glycol 3350 (Miralax), 17 GM PO QPM PRN for PRN


Psyllium (Metamucil), 1 DOSE PO QPM PRN for PRN


Triamcinolone Acet (Triamcinolone Acetonide), 1 APPLN TOP BID PRN for prn





Review of Systems


Constitutional:  No fever, No chills, No fatigue


Eyes:  No worsening of vision, No diplopia


ENT:  No hearing loss, No sore throat, No trouble swallowing


Respiratory:  No cough, No sputum, No wheezing, No shortness of breath, No 

dyspnea on exertion


Cardiovascular:  No chest pain, No orthopnea, No edema, No palpitations


Abdomen:  No pain, No nausea, No vomiting, No diarrhea, No constipation


Musculoskeletal:  + joint pain, No muscle pain


Genitourinary - Female:  No dysuria, No urinary frequency, No urinary urgency


Neurologic:  No weakness


Hematologic / Lymphatic:  No abnormal bleeding/bruising, No clotting problems


Integumentary:  No rash, No itch





Physical Exam


Vital Signs











  Date Time  Temp Pulse Resp B/P (MAP) Pulse Ox O2 Delivery O2 Flow Rate FiO2


 


5/13/18 21:55  74 22 180/89 96 Nasal Cannula 2.0 


 


5/13/18 20:33 36.5 78 24 171/113 94 Room Air  








General Appearance:  WD/WN, + pertinent finding (mild distress secondary to pain

)


Head:  normocephalic, atraumatic


Eyes:  normal inspection, PERRL, EOMI, sclerae normal


ENT:  normal ENT inspection, pharynx normal


Neck:  supple, no adenopathy, thyroid normal, no JVD, no carotid bruits, 

trachea midline


Respiratory/Chest:  chest non-tender, lungs clear, normal breath sounds, no 

respiratory distress, no accessory muscle use


Cardiovascular:  no edema, no gallop, no JVD, no murmur, normal peripheral 

pulses, + irregularly irregular


Abdomen/GI:  normal bowel sounds, non tender, soft


Extremities/Musculoskelatal:  + pertinent finding (right arm in sling, 

extremity is warm, palpable pulses, sensation intact)


Neurologic/Psych:  alert, oriented x 3


Skin:  normal color, warm/dry, no rash





Diagnostics


Laboratory Results





Results Past 24 Hours








Test


  5/13/18


21:00 5/13/18


21:35 Range/Units


 


 


White Blood Count 8.70  4.8-10.8  K/uL


 


Red Blood Count 4.81  4.2-5.4  M/uL


 


Hemoglobin 14.7  12.0-16.0  g/dL


 


Hematocrit 42.2  37-47  %


 


Mean Corpuscular Volume 87.7    fL


 


Mean Corpuscular Hemoglobin 30.6  25-34  pg


 


Mean Corpuscular Hemoglobin


Concent 34.8


  


  32-36  g/dl


 


 


Platelet Count 211  130-400  K/uL


 


Mean Platelet Volume 9.1  7.4-10.4  fL


 


Neutrophils (%) (Auto) 76.3   %


 


Lymphocytes (%) (Auto) 16.0   %


 


Monocytes (%) (Auto) 5.6   %


 


Eosinophils (%) (Auto) 1.7   %


 


Basophils (%) (Auto) 0.2   %


 


Neutrophils # (Auto) 6.63  1.4-6.5  K/uL


 


Lymphocytes # (Auto) 1.39  1.2-3.4  K/uL


 


Monocytes # (Auto) 0.49  0.11-0.59  K/uL


 


Eosinophils # (Auto) 0.15  0-0.5  K/uL


 


Basophils # (Auto) 0.02  0-0.2  K/uL


 


RDW Standard Deviation 45.1  36.4-46.3  fL


 


RDW Coefficient of Variation 14.0  11.5-14.5  %


 


Immature Granulocyte % (Auto) 0.2   %


 


Immature Granulocyte # (Auto) 0.02  0.00-0.02  K/uL


 


Sodium Level 132  136-145  mmol/L


 


Potassium Level 3.9  3.5-5.1  mmol/L


 


Chloride Level 98    mmol/L


 


Carbon Dioxide Level 26  21-32  mmol/L


 


Anion Gap 8.0  3-11  mmol/L


 


Blood Urea Nitrogen 15  7-18  mg/dl


 


Creatinine


  0.91


  


  0.60-1.20


mg/dl


 


Est Creatinine Clear Calc


Drug Dose 57.3


  


   ml/min


 


 


Estimated GFR (


American) 68.6


  


   


 


 


Estimated GFR (Non-


American 59.2


  


   


 


 


BUN/Creatinine Ratio 16.8  10-20  


 


Random Glucose 130  70-99  mg/dl


 


Calcium Level 8.6  8.5-10.1  mg/dl


 


Prothrombin Time


  


  25.2


  9.0-12.0


SECONDS


 


Prothromb Time International


Ratio 


  2.4


  0.9-1.1  


 


 


Activated Partial


Thromboplast Time 


  35.6


  21.0-31.0


SECONDS


 


Partial Thromboplastin Ratio  1.4  











Diagnostic Radiology


CHEST ONE VIEW PORTABLE





HISTORY:      trip and fall





COMPARISON: Chest 3/30/2018.





FINDINGS: No pneumothorax. No pleural effusions. The heart remains enlarged.


There is mild central pulmonary vascular congestion. No focal lung


consolidations to suggest pneumonia. A few linear density at the left lung base


favor subsegmental atelectasis. The patient is rotated on this study. There is


an impacted right humeral neck fracture.





IMPRESSION:





1. Impacted right humeral neck fracture.


2. Cardiomegaly and mild pulmonary vascular congestion.


################################################################################

#############





R HUMERUS MIN 2 VIEWS ROUTINE, R FOREARM 2 VIEWS ROUTINE





CLINICAL HISTORY: trip fall right shoulder pain   





COMPARISON STUDY:  None.





FINDINGS: There is an impacted right humeral neck fracture. There is mild medial


displacement of the humeral head in relation to the humeral shaft. No


dislocation within the right shoulder. The distal humerus, radius, and ulna are


intact. Mild soft tissue swelling within the forearm. The bones are osteopenic.





IMPRESSION:  1. An impacted right humeral neck fracture.


2. No fractures within the right radius or ulna. 











################################################################################

####


R HUMERUS MIN 2 VIEWS ROUTINE, R FOREARM 2 VIEWS ROUTINE





CLINICAL HISTORY: trip fall right shoulder pain   





COMPARISON STUDY:  None.





FINDINGS: There is an impacted right humeral neck fracture. There is mild medial


displacement of the humeral head in relation to the humeral shaft. No


dislocation within the right shoulder. The distal humerus, radius, and ulna are


intact. Mild soft tissue swelling within the forearm. The bones are osteopenic.





IMPRESSION:  1. An impacted right humeral neck fracture.


2. No fractures within the right radius or ulna.





EKG


ordered





Impression


Assessment and Plan


83yo C female s/p mechanical fall with resultant right impacted humerus 

fracture.  Patient requiring large doses of pain medication for comfort.





1.  Humerus fracture - neurovascularly intact, pain not well controlled at 

present


-Admit to general medical floor


-Pain management with Morphine 1mg IV q 2 hours as needed, will adjust as 

necessary


-Zofran PRN nausea


-Colace and Miralax PRN


-Arm to be immobilized in sling


-Orthopedic consultation in AM





2.  HTN - patient presently hypertensive, most likely secondary to pain from 

acute fracture.  She reports adequate blood pressure control at home with HCTZ 

12.5mg po daily


-Resume HCTZ daily


-Pain control as above





3.  JANEY - stable respiratory status at present


-Home BiPAP





4.  Atrial fibrillation - rate controlled, patient taking Coumadin 


-INR pending


-Resume home dosage Coumadin


-INR daily





5.  F/E/N - heplock.  Na slightly low at 132, continue to monitor. Regular diet 

as tolerated





6.  Ppx - patient on full dose anticoagulation with Coumadin for history of AF, 

continue





7.  Code - Full per discussion with patient





8.  Dispo - Observation to medical floor, IV pain medications and Ortho consult 

in AM





Resuscitation Status








VTE Prophylaxis


Will order VTE Prophylaxis:  Yes

## 2018-05-14 VITALS
TEMPERATURE: 98.42 F | SYSTOLIC BLOOD PRESSURE: 134 MMHG | OXYGEN SATURATION: 94 % | DIASTOLIC BLOOD PRESSURE: 81 MMHG | HEART RATE: 85 BPM

## 2018-05-14 VITALS
TEMPERATURE: 97.7 F | HEART RATE: 61 BPM | SYSTOLIC BLOOD PRESSURE: 149 MMHG | DIASTOLIC BLOOD PRESSURE: 81 MMHG | OXYGEN SATURATION: 94 %

## 2018-05-14 VITALS
TEMPERATURE: 97.52 F | SYSTOLIC BLOOD PRESSURE: 104 MMHG | DIASTOLIC BLOOD PRESSURE: 71 MMHG | HEART RATE: 78 BPM | OXYGEN SATURATION: 96 %

## 2018-05-14 VITALS
OXYGEN SATURATION: 95 % | SYSTOLIC BLOOD PRESSURE: 108 MMHG | HEART RATE: 79 BPM | TEMPERATURE: 98.6 F | DIASTOLIC BLOOD PRESSURE: 68 MMHG

## 2018-05-14 LAB — INR PPP: 2.2 (ref 0.9–1.1)

## 2018-05-14 RX ADMIN — MORPHINE SULFATE PRN MG: 4 INJECTION, SOLUTION INTRAMUSCULAR; INTRAVENOUS at 07:16

## 2018-05-14 RX ADMIN — TIMOLOL MALEATE SCH DROPS: 5 SOLUTION OPHTHALMIC at 09:52

## 2018-05-14 RX ADMIN — MORPHINE SULFATE PRN MG: 4 INJECTION, SOLUTION INTRAMUSCULAR; INTRAVENOUS at 01:05

## 2018-05-14 RX ADMIN — Medication SCH MG: at 09:06

## 2018-05-14 RX ADMIN — Medication SCH INTER.UNIT: at 09:07

## 2018-05-14 RX ADMIN — OXYCODONE HYDROCHLORIDE PRN MG: 5 TABLET ORAL at 23:46

## 2018-05-14 RX ADMIN — DOCUSATE SODIUM SCH MG: 100 CAPSULE, LIQUID FILLED ORAL at 21:00

## 2018-05-14 RX ADMIN — HYDROCHLOROTHIAZIDE SCH MG: 25 TABLET ORAL at 09:06

## 2018-05-14 RX ADMIN — MORPHINE SULFATE PRN MG: 4 INJECTION, SOLUTION INTRAMUSCULAR; INTRAVENOUS at 13:15

## 2018-05-14 RX ADMIN — WARFARIN SODIUM SCH MG: 7.5 TABLET ORAL at 16:07

## 2018-05-14 RX ADMIN — Medication SCH TAB: at 09:07

## 2018-05-14 RX ADMIN — MORPHINE SULFATE PRN MG: 4 INJECTION, SOLUTION INTRAMUSCULAR; INTRAVENOUS at 20:07

## 2018-05-14 RX ADMIN — MORPHINE SULFATE PRN MG: 4 INJECTION, SOLUTION INTRAMUSCULAR; INTRAVENOUS at 09:13

## 2018-05-14 RX ADMIN — MORPHINE SULFATE PRN MG: 4 INJECTION, SOLUTION INTRAMUSCULAR; INTRAVENOUS at 16:37

## 2018-05-14 RX ADMIN — Medication SCH EA: at 21:00

## 2018-05-14 RX ADMIN — DOCUSATE SODIUM SCH MG: 100 CAPSULE, LIQUID FILLED ORAL at 09:06

## 2018-05-14 RX ADMIN — MORPHINE SULFATE PRN MG: 4 INJECTION, SOLUTION INTRAMUSCULAR; INTRAVENOUS at 22:29

## 2018-05-14 RX ADMIN — MORPHINE SULFATE PRN MG: 4 INJECTION, SOLUTION INTRAMUSCULAR; INTRAVENOUS at 04:53

## 2018-05-14 NOTE — DIAGNOSTIC IMAGING REPORT
R SHOULDER MIN 2 VIEWS ROUTINE



CLINICAL HISTORY: right proximal humeral neck fracture     



COMPARISON: 5/13/2018



DISCUSSION: 2 views are provided for interpretation. The impacted humeral neck

fracture remains unchanged in alignment. There is no dislocation.    



IMPRESSION: No change in the orientation of the age-indeterminate impacted

humeral neck fracture with secondary arthritic change







Electronically signed by:  Sergey Tidwell M.D.

5/14/2018 1:49 PM



Dictated Date/Time:  5/14/2018 1:47 PM

## 2018-05-14 NOTE — HOSPITALIST PROGRESS NOTE
Hospitalist Progress Note


Date of Service


May 14, 2018.





Subjective


Pt evaluation today including:  conversation w/ patient, physical exam, chart 

review, lab review, review of studies, review of inpatient medication list


Patient seen and evaluated. No acute events overnight.


States she was having a lot of pain this AM and was grouchy and wanted to 

apologize to the therapists that saw her this morning. Did encourage her to 

participate in PT/OT as much as she can and she agreed.


She said she is just upset as she was just recovering  from her knee and then 

this happened. Wants a referral to [facility] as conservative measures is the plan.





   Constitutional:  No fever, No chills


   Respiratory:  No cough, No shortness of breath


   Cardiovascular:  No chest pain


   Abdomen:  + nausea (when she had pain but is resolved now), No pain, No 

vomiting, No diarrhea, No constipation


   Musculoskeletal:  + joint pain (R shoulder), + swelling (chronic b/l lower 

extremity)


   Female :  No dysuria


   Heme:  No abnormal bleeding/bruising


   Skin:  No rash





Medications





Current Inpatient Medications








 Medications


  (Trade)  Dose


 Ordered  Sig/Hanane


 Route  Start Time


 Stop Time Status Last Admin


Dose Admin


 


 Polyethylene


  (Miralax Powder


 Packet)  17 gm  DAILY  PRN


 PO  5/13/18 23:00


 6/12/18 22:59   


 


 


 Ondansetron HCl


  (Zofran Inj)  4 mg  Q6H  PRN


 IV  5/13/18 22:15


 6/12/18 22:14   


 


 


 Docusate Sodium


  (coLACE CAP)  100 mg  BID


 PO  5/14/18 09:00


 6/13/18 08:59  5/14/18 09:06


100 MG


 


 Calcium/Vitamin D


  (Caltrate Plus


 Tab)  1 tab  DAILY


 PO  5/14/18 09:00


 6/13/18 08:59  5/14/18 09:07


1 TAB


 


 Cholecalciferol


  (Vitamin D Tab)  1,000


 inter.unit  QAM


 PO  5/14/18 09:00


 6/13/18 08:59  5/14/18 09:07


1,000 INTER.UNIT


 


 Hydrochlorothiazide


  (Hydrochlorothiazide


 Tab)  12.5 mg  QAM


 PO  5/14/18 09:00


 6/13/18 08:59  5/14/18 09:06


12.5 MG


 


 Timolol Maleate


  (Timoptic 0.25%


 Oph Soln)  1 drops  QAM


 OPB  5/14/18 09:00


 6/13/18 08:59  5/14/18 09:52


1 DROPS


 


 Warfarin Sodium


  (Coumadin Tab)  5 mg  SuTuThSa@1600


 PO  5/15/18 16:00


 6/14/18 15:59   


 


 


 Warfarin Sodium


  (Coumadin Tab)  7.5 mg  MoWeFr@1600


 PO  5/14/18 16:00


 6/13/18 15:59   


 


 


 Loratadine


  (Claritin Tab)  10 mg  QAM


 PO  5/14/18 09:00


 6/13/18 08:59  5/14/18 09:06


10 MG


 


 Morphine Sulfate


  (MoRPHine


 SULFATE INJ)  1 mg  Q2HWA  PRN


 IV  5/13/18 22:15


 5/27/18 22:14  5/14/18 13:15


1 MG


 


 Miscellaneous


  (Iv Fluids


 Completed)  1 ea  PRN  PRN


 N/A  5/13/18 23:30


 5/13/19 23:29   


 


 


 Menthol


  (Nice Tiffany)  1 tiffany  PRN  PRN


 TIFFANY  5/14/18 01:15


 6/13/18 01:14   


 











Objective


Vital Signs











  Date Time  Temp Pulse Resp B/P (MAP) Pulse Ox O2 Delivery O2 Flow Rate FiO2


 


5/14/18 07:55      Room Air  


 


5/14/18 07:05 36.5 61 18 149/81 (103) 94 Room Air  


 


5/14/18 03:45 36.4 78 16 104/71 (82) 96 CPAP  


 


5/14/18 00:38      Room Air  


 


5/13/18 23:30 36.4  18 166/95 (118) 92 Room Air  


 


5/13/18 23:30      BiPAP  


 


5/13/18 23:09  72 20 159/90 95 Room Air  


 


5/13/18 21:55  74 22 180/89 96 Nasal Cannula 2.0 


 


5/13/18 20:33 36.5 78 24 171/113 94 Room Air  











Physical Exam


General Appearance:  WD/WN, no apparent distress


Eyes:  sclerae normal


ENT:  hearing grossly normal


Neck:  supple, no JVD, trachea midline


Respiratory/Chest:  lungs clear, normal breath sounds, no respiratory distress, 

no accessory muscle use


Cardiovascular:  regular rate, rhythm, no gallop, no murmur


Abdomen:  normal bowel sounds, non tender, soft


Extremities:  + swelling (b/l nonpitting of lower extremities - chronic), + 

pertinent finding (R arm in cling; movement to fingers, good cap refill)


Neurologic/Psychiatric:  alert, oriented x 3


Skin:  normal color, warm/dry





Laboratory Results





Last 24 Hours








Test


  5/13/18


21:00 5/13/18


21:35 5/14/18


05:13


 


White Blood Count 8.70 K/uL   


 


Red Blood Count 4.81 M/uL   


 


Hemoglobin 14.7 g/dL   


 


Hematocrit 42.2 %   


 


Mean Corpuscular Volume 87.7 fL   


 


Mean Corpuscular Hemoglobin 30.6 pg   


 


Mean Corpuscular Hemoglobin


Concent 34.8 g/dl 


  


  


 


 


Platelet Count 211 K/uL   


 


Mean Platelet Volume 9.1 fL   


 


Neutrophils (%) (Auto) 76.3 %   


 


Lymphocytes (%) (Auto) 16.0 %   


 


Monocytes (%) (Auto) 5.6 %   


 


Eosinophils (%) (Auto) 1.7 %   


 


Basophils (%) (Auto) 0.2 %   


 


Neutrophils # (Auto) 6.63 K/uL   


 


Lymphocytes # (Auto) 1.39 K/uL   


 


Monocytes # (Auto) 0.49 K/uL   


 


Eosinophils # (Auto) 0.15 K/uL   


 


Basophils # (Auto) 0.02 K/uL   


 


RDW Standard Deviation 45.1 fL   


 


RDW Coefficient of Variation 14.0 %   


 


Immature Granulocyte % (Auto) 0.2 %   


 


Immature Granulocyte # (Auto) 0.02 K/uL   


 


Sodium Level 132 mmol/L   


 


Potassium Level 3.9 mmol/L   


 


Chloride Level 98 mmol/L   


 


Carbon Dioxide Level 26 mmol/L   


 


Anion Gap 8.0 mmol/L   


 


Blood Urea Nitrogen 15 mg/dl   


 


Creatinine 0.91 mg/dl   


 


Est Creatinine Clear Calc


Drug Dose 57.3 ml/min 


  


  


 


 


Estimated GFR (


American) 68.6 


  


  


 


 


Estimated GFR (Non-


American 59.2 


  


  


 


 


BUN/Creatinine Ratio 16.8   


 


Random Glucose 130 mg/dl   


 


Calcium Level 8.6 mg/dl   


 


Magnesium Level 2.1 mg/dl   


 


Prothrombin Time  25.2 SECONDS  22.6 SECONDS 


 


Prothromb Time International


Ratio 


  2.4 


  2.2 


 


 


Activated Partial


Thromboplast Time 


  35.6 SECONDS 


  


 


 


Partial Thromboplastin Ratio  1.4  











Assessment and Plan


81yo C female s/p mechanical fall with resultant right impacted humerus 

fracture.  Patient requiring large doses of pain medication for comfort.





R Humerus Fx:


- Pain control is improving - will need to find good oral coverage


- Will add Tylenol 1000 mg TID PRN and Oxycodone 5 mg Q4H PRN with Morphine IV 

PRN for breakthrough


- Continue Sling but may remove when resting to allow for ROM exercises


- Ortho following - plan on outpatient F/U - conservative measures at this time


   -- F/U outpatient in 1-2 weeks with Dr. Vasques





HTN: Better Controlled:


- HCTZ 12.5 mg daily





JANEY on BiPAP: May use own machine





Persistent Atrial Fibrillation: Rate Controlled:


- Warfarin daily - INR 2.2 and will trend


- No rate control medication however has been controlled





DVT Prophylaxis: Coumadin





Code Status: FULL RESUSCITATION





Disposition: Needs 3 midnight stay - tonight will be first midnight


- Referral to [facility]


Continued [facility] stay due to:  inadequate oral pain control


Discharge planning:  skilled nursing facility

## 2018-05-14 NOTE — CONSULTATION REPORT
DATE OF CONSULTATION:  05/14/2018

 

REASON FOR CONSULTATION:  Impacted right humeral neck fracture.

 

HISTORY OF PRESENT ILLNESS:  This is a pleasant 82-year-old female that was

referred to orthopedics secondary to a mechanical fall that occurred on

05/13/2018 where she tripped on a rug at home, landing directly on her right

shoulder.  She was transported to the Emergency Room via ambulance.  X-rays

were performed showing an impacted right humeral neck fracture.  No other

fractures were noted to her right upper extremity.  The patient denied any

head trauma, loss of consciousness, or pain other than her right shoulder. 

She was admitted through the Emergency Room to the medicine service and

referred to orthopedic for consultation.  The patient states that at rest her

pain is controlled with medication, her right shoulder hurts when she tries

to move.  She denies any neck pain.  She denies any right elbow pain,

forearm, wrist, or finger pain.  She denies any numbness, tingling, or

radiation to her right upper extremity.

 

PAST MEDICAL HISTORY:  Idiopathic angioedema, hypertension, breast cancer,

osteoarthritis, JANEY, hyperlipidemia, aortic aneurysm.

 

PAST SURGICAL HISTORY:  Left breast lumpectomy, left total hip replacement,

right total hip replacement, left knee replacement (replacements performed by 
Dr. Nascimento).

 

FAMILY HISTORY:  Noncontributory.

 

SOCIAL HISTORY:  The patient lives alone.  She does have a daughter in the

area.  She is .  She occasionally drinks.  She denies use of

recreational drugs.  Currently not a smoker.

 

REVIEW OF SYSTEMS:  Denies fevers, chills, sweats, signs or symptoms of

infection.  Denies chest pain, shortness of breath, lightheadedness,

dizziness, chest pain.  Denies numbness, tingling, radiation, neck pain. 

Denies urinary or fecal incontinence or evidence of seizure activity. 

Otherwise, review of systems negative except right shoulder pain.

 

ALLERGIES:  ASPIRIN, TRAMADOL, MONOSODIUM GLUTAMATE, AROMATIC OILS,

ERYTHROMYCIN, TETRACYCLINE, CEPHALEXIN, OMEPRAZOLE, AMOXICILLIN, CLAVULANIC

ACID, METOPROLOL, ALENDRONATE, ESOMEPRAZOLE, NYSTATIN.

 

HOME MEDICATIONS:  Astelin nasal spray, Caltrate 600, carboxymethylcellulose

sodium sterile lubricant drops for her eyes, vitamin D3, Benadryl Allergy,

Flonase, hydrochlorothiazide, Claritin, Timoptic ophthalmic eyedrops,

verapamil HCl, Coumadin, clotrimazole topical cream.

 

P.r.n. meds include Tylenol with codeine, vitamin C, EpiPen, Mucinex,

MiraLax, Metamucil, triamcinolone acetonide.

 

PHYSICAL EXAMINATION:

GENERAL:  The patient is an 82-year-old female, alert and oriented x3. 

Normal mood and affect.  Initially at the beginning of consultation, she was

in mild distress as she needed assistance onto the bedside commode due to

right shoulder pain, however, after which she was resting comfortably in bed.

HEENT:  Normocephalic, nontraumatic.

NECK:  Able to rotate neck without pain.  Negative Spurling's.

SKIN:  Right upper extremity skin intact.  No open sores, bruises, or 
abrasions. 

MUSCULOSKELETAL: Focussed right upper extremity exam reveals her right arm is 
in a sling. 

Difficult to examine her right shoulder due to the amount of pain.  She was

neurovascularly intact to the right upper extremity with intact motor and

sensation.  Elbow motion was functional, limited in extension due to

inability to remove the sling due to pain, able to flex her elbow to her

chin, able to make a full  to her right hand.  Strength testing again was

limited due to pain.  She had palpable radial pulse, good capillary refill. 

Sensation intact to light touch.  Mild swelling noted to her right hand.

 

DIAGNOSTIC TESTING:  X-rays of the patient's right proximal humerus reveal an

impacted right humeral neck fracture, nondisplaced.  X-rays of the forearm

also showed no fracture.

 

ASSESSMENT:  Right proximal humeral neck fracture, impacted, nondisplaced.

 

PLAN:  We will order an overhead trapeze while she is in-house.   We will 
obtain 2 view right proximal humerus xrays, as she only has a humerus x-ray.  
We recommended RICE treatment, rest, ice, immobilization with a sling, elevate.
   She can remove sling at rest and do gentle elbow, wrist, finger motion.  She 
was educated on proper donnning and doffing of sling.  No lifting or 
weightbearing to right upper extremity.  She should follow-up with our office 
in 1-2 weeks [facility] [phone].  She was appreciative of the 
consultation.

 

I, Dr. Vasques, saw and examined the patient and discussed the management with my 
PA. I reviewed my PAs note and agree with the documented findings and the plan 
of care I developed.  
TREVON

## 2018-05-15 VITALS
HEART RATE: 88 BPM | OXYGEN SATURATION: 96 % | SYSTOLIC BLOOD PRESSURE: 103 MMHG | TEMPERATURE: 98.6 F | DIASTOLIC BLOOD PRESSURE: 70 MMHG

## 2018-05-15 VITALS
DIASTOLIC BLOOD PRESSURE: 88 MMHG | TEMPERATURE: 98.24 F | SYSTOLIC BLOOD PRESSURE: 140 MMHG | HEART RATE: 85 BPM | OXYGEN SATURATION: 95 %

## 2018-05-15 VITALS
DIASTOLIC BLOOD PRESSURE: 87 MMHG | HEART RATE: 88 BPM | OXYGEN SATURATION: 95 % | SYSTOLIC BLOOD PRESSURE: 134 MMHG | TEMPERATURE: 98.24 F

## 2018-05-15 LAB
BUN SERPL-MCNC: 12 MG/DL (ref 7–18)
CALCIUM SERPL-MCNC: 8.3 MG/DL (ref 8.5–10.1)
CO2 SERPL-SCNC: 31 MMOL/L (ref 21–32)
CREAT SERPL-MCNC: 0.86 MG/DL (ref 0.6–1.2)
EOSINOPHIL NFR BLD AUTO: 176 K/UL (ref 130–400)
GLUCOSE SERPL-MCNC: 115 MG/DL (ref 70–99)
HCT VFR BLD CALC: 36.4 % (ref 37–47)
HGB BLD-MCNC: 12.7 G/DL (ref 12–16)
MCH RBC QN AUTO: 30.5 PG (ref 25–34)
MCHC RBC AUTO-ENTMCNC: 34.9 G/DL (ref 32–36)
MCV RBC AUTO: 87.3 FL (ref 80–100)
PMV BLD AUTO: 8.8 FL (ref 7.4–10.4)
POTASSIUM SERPL-SCNC: 3.3 MMOL/L (ref 3.5–5.1)
RED CELL DISTRIBUTION WIDTH CV: 14.2 % (ref 11.5–14.5)
RED CELL DISTRIBUTION WIDTH SD: 45.5 FL (ref 36.4–46.3)
SODIUM SERPL-SCNC: 132 MMOL/L (ref 136–145)
WBC # BLD AUTO: 7.39 K/UL (ref 4.8–10.8)

## 2018-05-15 RX ADMIN — MORPHINE SULFATE PRN MG: 4 INJECTION, SOLUTION INTRAMUSCULAR; INTRAVENOUS at 03:12

## 2018-05-15 RX ADMIN — POTASSIUM CHLORIDE SCH MEQ: 1500 TABLET, EXTENDED RELEASE ORAL at 09:28

## 2018-05-15 RX ADMIN — Medication SCH INTER.UNIT: at 08:36

## 2018-05-15 RX ADMIN — LIDOCAINE SCH PATCH: 50 PATCH CUTANEOUS at 08:36

## 2018-05-15 RX ADMIN — Medication SCH EA: at 21:16

## 2018-05-15 RX ADMIN — DOCUSATE SODIUM SCH MG: 100 CAPSULE, LIQUID FILLED ORAL at 08:35

## 2018-05-15 RX ADMIN — OXYCODONE HYDROCHLORIDE PRN MG: 5 TABLET ORAL at 17:23

## 2018-05-15 RX ADMIN — WARFARIN SODIUM SCH MG: 5 TABLET ORAL at 17:23

## 2018-05-15 RX ADMIN — Medication SCH TAB: at 08:35

## 2018-05-15 RX ADMIN — Medication SCH MG: at 08:35

## 2018-05-15 RX ADMIN — OXYCODONE HYDROCHLORIDE PRN MG: 5 TABLET ORAL at 21:15

## 2018-05-15 RX ADMIN — OXYCODONE HYDROCHLORIDE PRN MG: 5 TABLET ORAL at 04:27

## 2018-05-15 RX ADMIN — OXYCODONE HYDROCHLORIDE PRN MG: 5 TABLET ORAL at 12:45

## 2018-05-15 RX ADMIN — HYDROCHLOROTHIAZIDE SCH MG: 25 TABLET ORAL at 08:35

## 2018-05-15 RX ADMIN — TIMOLOL MALEATE SCH DROPS: 5 SOLUTION OPHTHALMIC at 08:36

## 2018-05-15 RX ADMIN — OXYCODONE HYDROCHLORIDE PRN MG: 5 TABLET ORAL at 08:39

## 2018-05-15 RX ADMIN — DOCUSATE SODIUM SCH MG: 100 CAPSULE, LIQUID FILLED ORAL at 21:16

## 2018-05-15 RX ADMIN — POTASSIUM CHLORIDE SCH MEQ: 1500 TABLET, EXTENDED RELEASE ORAL at 21:23

## 2018-05-15 NOTE — ORTHOPEDIC PROGRESS NOTE
Orthopedic Progress Note


Date of Service


May 15, 2018.





Subjective


Post OP Day:  


Reports: feeling well, complaints (Right shoulder pain with motion), Denies: 

chest pain, SOB, nausea / vomiting, light headedness, calf pain


Additional Notes:


Has sling on. Nurse in room.





Objective


calves soft nontender, N/V intact, A&O x3, toes mobile, CMS intact


Intact motion to R wrist and elbow. c/o R shoulder pain with minimal motion.


Bilat knee exam is benign. Minor pain over her R medial tibia consistent with 

last week's exam.


Bilat hip exam is also benign. Supple motion without pain. No suggestion of 

fracture or dislocation.











  Date Time  Temp Pulse Resp B/P (MAP) Pulse Ox O2 Delivery O2 Flow Rate FiO2


 


5/15/18 07:12 36.8 88 16 134/87 (103) 95 Room Air  


 


5/14/18 23:55      Room Air  


 


5/14/18 23:09 36.9 85 19 134/81 (98) 94 CPAP  


 


5/14/18 15:40      Room Air  


 


5/14/18 15:10 37.0 79 18 108/68 (81) 95 Room Air  








Laboratory Results 24 Hours:











Test


  5/15/18


05:57


 


Hematocrit 36.4 % 


 


Hemoglobin 12.7 g/dL 











Assessment & Plan


Assessment:


HD #3, Right proximal humerus fracture, non-displaced.


Plan:


Continue with sling, ice. Sling re-adjusted.


PT/OT:  only work on ROM elbow to hand RUE.  WBAT BLE.


Continue pain control.


Continue care per primary service.


Please recall if any orthopaedic issues.  F/U at [facility] in 1

-2 weeks.








Discharge Planning


Discharge Planning:  uncertain


Pain Management:  Percocet


DVT Prophylaxis:  Raheem

## 2018-05-15 NOTE — CLINICAL DOCUMENTATION QUERY
**** CLINICAL DOCUMENTATION QUERY****





Dr. OGDEN, 



In your clinical opinion is this patient being managed for:

    

                        ( x ) Osteoporosis related fracture, R humerus

                        (  ) Not Agree



                        (  ) Other explanation of clinical findings (No explanation is 
considered a No Response)

                        (  ) Unable to determine 

                        (  ) Need to Discuss (Phone CDS or qliq) (No discussion is 
considered a No Response)

                                             

The medical record reflects the following clinical findings, treatment, and risk factors.  
  



Clinical Indicators: 81 yo female with ground level fall and subsequent R humerus 
fracture. Humerus xray indicates the bones are osteopenic.

Treatment:chronic home meds include caltrate and vitamin D3, ortho consult, RICE 
treatment, pain control, sling

Risk Factors: postmenopausal female



Please clarify and document your clinical opinion in the progress notes and discharge 
summary. Terms such as "probable", "suspected", "likely", "questionable", "possible", or 
"still to be ruled out" are acceptable. 



*****IF IN AGREEMENT, YOU MUST DOCUMENT ABOVE DIAGNOSTIC STATEMENT IN DAILY PROGRESS NOTES 
AND DISCHARGE SUMMARY. This document is not part of the patient's record.*****

Thank You, Megan Alegria, [phone]

## 2018-05-15 NOTE — ORTHOPEDIC PROGRESS NOTE
Orthopedic Progress Note


Date of Service


May 15, 2018.





Subjective


Post OP Day:  HD #2


Additional Notes:


feeling better.





Objective


N/V intact, A&O x3











  Date Time  Temp Pulse Resp B/P (MAP) Pulse Ox O2 Delivery O2 Flow Rate FiO2


 


5/15/18 07:12 36.8 88 16 134/87 (103) 95 Room Air  


 


5/14/18 23:55      Room Air  


 


5/14/18 23:09 36.9 85 19 134/81 (98) 94 CPAP  


 


5/14/18 15:40      Room Air  


 


5/14/18 15:10 37.0 79 18 108/68 (81) 95 Room Air  








Laboratory Results 24 Hours:











Test


  5/15/18


05:57


 


Hematocrit 36.4 % 


 


Hemoglobin 12.7 g/dL 








Additional Notes:


R SHOULDER MIN 2 VIEWS ROUTINE





CLINICAL HISTORY: right proximal humeral neck fracture     





COMPARISON: 5/13/2018





DISCUSSION: 2 views are provided for interpretation. The impacted humeral neck


fracture remains unchanged in alignment. There is no dislocation.    





IMPRESSION: No change in the orientation of the age-indeterminate impacted


humeral neck fracture with secondary arthritic change





Assessment & Plan


Assessment:


HD #2, Right proximal humerus fracture, non-displaced.


Plan:


Continue with sling, ice.


PT/OT:  only work on ROM elbow to hand RUE.  WBAT BLE.


Continue pain control.


Continue care per primary service.


Please recall if any orthopaedic issues.  F/U at [facility] in 1

-2 weeks. Call [phone] for appt.

## 2018-05-15 NOTE — HOSPITALIST PROGRESS NOTE
Hospitalist Progress Note


Date of Service


May 15, 2018.





Subjective


Pt evaluation today including:  conversation w/ patient, physical exam, chart 

review, lab review, review of inpatient medication list


Patient seen and evaluated. No acute events overnight.


Having some difficulty controlling pain. She is very frustrated with her 

current situation. Feels she is making progress but also feels that she isn't 

able to do enough. Very emotional and feels that she needs to continue to push 

hard because she knows at her age if she doesn't its a step closer to death.


However she is having limitations due to pain. She is easily irritable and 

feels belittled because she knows what she needs to do but is struggling.


Had a long conversation with patient as she is having a lot of different 

emotions at this time. Tried to discuss the need to push past the comfort zone 

but to also take it easy and to find a good balance of this. Patient is tearful 

during exam and gets easily confrontational but expresses that she feels that 

this is being done to her as well.


At this point, hopefully with improved pain management she will be more 

comfortable and able to participate more in treatment. Did make adjustments to 

pain medications and will need to watch for oversedation.


Also concerned as she hasn't had a bowel movement since Sunday. Will add Senna





   Additional Comments:


ROS limited due to easily agitated with questions. Reports neck discomfort from 

the sling and R arm pain with intermittent numbness into fingers





Medications





Current Inpatient Medications








 Medications


  (Trade)  Dose


 Ordered  Sig/Hanane


 Route  Start Time


 Stop Time Status Last Admin


Dose Admin


 


 Polyethylene


  (Miralax Powder


 Packet)  17 gm  DAILY  PRN


 PO  5/13/18 23:00


 6/12/18 22:59   


 


 


 Ondansetron HCl


  (Zofran Inj)  4 mg  Q6H  PRN


 IV  5/13/18 22:15


 6/12/18 22:14   


 


 


 Docusate Sodium


  (coLACE CAP)  100 mg  BID


 PO  5/14/18 09:00


 6/13/18 08:59  5/15/18 08:35


100 MG


 


 Calcium/Vitamin D


  (Caltrate Plus


 Tab)  1 tab  DAILY


 PO  5/14/18 09:00


 6/13/18 08:59  5/15/18 08:35


1 TAB


 


 Cholecalciferol


  (Vitamin D Tab)  1,000


 inter.unit  QAM


 PO  5/14/18 09:00


 6/13/18 08:59  5/15/18 08:36


1,000 INTER.UNIT


 


 Hydrochlorothiazide


  (Hydrochlorothiazide


 Tab)  12.5 mg  QAM


 PO  5/14/18 09:00


 6/13/18 08:59  5/15/18 08:35


12.5 MG


 


 Timolol Maleate


  (Timoptic 0.25%


 Oph Soln)  1 drops  QAM


 OPB  5/14/18 09:00


 6/13/18 08:59  5/15/18 08:36


1 DROPS


 


 Warfarin Sodium


  (Coumadin Tab)  5 mg  SuTuThSa@1600


 PO  5/15/18 16:00


 6/14/18 15:59   


 


 


 Warfarin Sodium


  (Coumadin Tab)  7.5 mg  MoWeFr@1600


 PO  5/14/18 16:00


 6/13/18 15:59  5/14/18 16:07


7.5 MG


 


 Loratadine


  (Claritin Tab)  10 mg  QAM


 PO  5/14/18 09:00


 6/13/18 08:59  5/15/18 08:35


10 MG


 


 Morphine Sulfate


  (MoRPHine


 SULFATE INJ)  1 mg  Q2HWA  PRN


 IV  5/13/18 22:15


 5/27/18 22:14  5/15/18 03:12


1 MG


 


 Miscellaneous


  (Iv Fluids


 Completed)  1 ea  PRN  PRN


 N/A  5/13/18 23:30


 5/13/19 23:29   


 


 


 Menthol


  (Nice Tiffany)  1 tiffany  PRN  PRN


 TIFFANY  5/14/18 01:15


 6/13/18 01:14   


 


 


 Acetaminophen


  (Tylenol Tab)  1,000 mg  Q8  PRN


 PO  5/14/18 14:00


 6/13/18 13:59   


 


 


 Lidocaine


  (Lidoderm Patch


 5%)  1 patch  QAM


 TD  5/15/18 09:00


 6/14/18 08:59  5/15/18 08:36


1 PATCH


 


 Miscellaneous


  (Remove Lidoderm


 Patch)  1 ea  DAILY@21


 N/A  5/14/18 21:00


 6/13/18 20:59  5/14/18 21:00


1 EA


 


 Diphenhydramine


 HCl


  (Benadryl Inj)  25 mg  HS


 IV  5/14/18 21:00


 6/13/18 20:59   


 


 


 Potassium Chloride


  (Klor-Con Tab)  20 meq  BID


 PO  5/15/18 09:00


 6/14/18 08:59  5/15/18 09:28


20 MEQ


 


 Oxycodone HCl


  (Roxicodone


 Immediate Rel Tab)  one tablet


 for pain


 1-5 ...  Q4H  PRN


 PO  5/15/18 12:45


 5/28/18 13:59  5/15/18 12:45


5 MG


 


 Senna


  (Senokot Tab)  17.2 mg  QAM


 PO  5/16/18 09:00


 6/15/18 08:59   


 











Objective


Vital Signs











  Date Time  Temp Pulse Resp B/P (MAP) Pulse Ox O2 Delivery O2 Flow Rate FiO2


 


5/15/18 07:45      Room Air  


 


5/15/18 07:12 36.8 88 16 134/87 (103) 95 Room Air  


 


5/14/18 23:55      Room Air  


 


5/14/18 23:09 36.9 85 19 134/81 (98) 94 CPAP  


 


5/14/18 15:40      Room Air  


 


5/14/18 15:10 37.0 79 18 108/68 (81) 95 Room Air  











Physical Exam


General Appearance:  + mild distress (emotional distress)


Eyes:  sclerae normal


ENT:  hearing grossly normal


Neurologic/Psychiatric:  alert


Skin:  normal color, warm/dry


Notes:


Limited physical examination as patient was having a lot of pain and did not 

want her arm manipulated. Due to agitation was not able to do much of a hands 

on examination





Laboratory Results





Last 24 Hours








Test


  5/15/18


05:57


 


White Blood Count 7.39 K/uL 


 


Red Blood Count 4.17 M/uL 


 


Hemoglobin 12.7 g/dL 


 


Hematocrit 36.4 % 


 


Mean Corpuscular Volume 87.3 fL 


 


Mean Corpuscular Hemoglobin 30.5 pg 


 


Mean Corpuscular Hemoglobin


Concent 34.9 g/dl 


 


 


RDW Standard Deviation 45.5 fL 


 


RDW Coefficient of Variation 14.2 % 


 


Platelet Count 176 K/uL 


 


Mean Platelet Volume 8.8 fL 


 


Sodium Level 132 mmol/L 


 


Potassium Level 3.3 mmol/L 


 


Chloride Level 95 mmol/L 


 


Carbon Dioxide Level 31 mmol/L 


 


Anion Gap 6.0 mmol/L 


 


Blood Urea Nitrogen 12 mg/dl 


 


Creatinine 0.86 mg/dl 


 


Est Creatinine Clear Calc


Drug Dose 60.7 ml/min 


 


 


Estimated GFR (


American) 73.4 


 


 


Estimated GFR (Non-


American 63.4 


 


 


BUN/Creatinine Ratio 14.4 


 


Random Glucose 115 mg/dl 


 


Calcium Level 8.3 mg/dl 











Assessment and Plan


83yo C female s/p mechanical fall with resultant right impacted humerus 

fracture.  Patient requiring large doses of pain medication for comfort.





R Humerus Fx:


- Pain control is not doing well today - did make some adjustments to 

medications


- Will add Tylenol 1000 mg TID PRN and Oxycodone 5-10 mg Q4H PRN with Morphine 

IV PRN for breakthrough; Ice/Heat; Lidoderm


- Continue Sling but may remove when resting to allow for ROM exercises


- Bowel regimen with Miralax PRN and Senna


- Ortho following - plan on outpatient F/U - conservative measures at this time


   -- F/U outpatient in 1-2 weeks with Dr. Vasques





HTN: Better Controlled:


- HCTZ 12.5 mg daily





JANEY on BiPAP: May use own machine





Persistent Atrial Fibrillation: Rate Controlled:


- Warfarin daily - INR 2.2 and will trend


- No rate control medication however has been controlled





DVT Prophylaxis: Coumadin





Code Status: FULL RESUSCITATION





Disposition: Needs 3 midnight stay


- Referral to [facility]


- PT/OT evaluations - hopefully with improved pain control cooperation will be 

better


Continued [facility] stay due to:  inadequate oral pain control


Discharge planning:  skilled nursing facility

## 2018-05-16 VITALS
OXYGEN SATURATION: 96 % | TEMPERATURE: 98.06 F | DIASTOLIC BLOOD PRESSURE: 81 MMHG | SYSTOLIC BLOOD PRESSURE: 134 MMHG | HEART RATE: 73 BPM

## 2018-05-16 VITALS
TEMPERATURE: 98.96 F | OXYGEN SATURATION: 93 % | DIASTOLIC BLOOD PRESSURE: 80 MMHG | SYSTOLIC BLOOD PRESSURE: 118 MMHG | HEART RATE: 75 BPM

## 2018-05-16 VITALS
SYSTOLIC BLOOD PRESSURE: 94 MMHG | HEART RATE: 83 BPM | DIASTOLIC BLOOD PRESSURE: 60 MMHG | TEMPERATURE: 98.6 F | OXYGEN SATURATION: 96 %

## 2018-05-16 LAB
BUN SERPL-MCNC: 14 MG/DL (ref 7–18)
CALCIUM SERPL-MCNC: 8.4 MG/DL (ref 8.5–10.1)
CO2 SERPL-SCNC: 28 MMOL/L (ref 21–32)
CREAT SERPL-MCNC: 0.88 MG/DL (ref 0.6–1.2)
EOSINOPHIL NFR BLD AUTO: 197 K/UL (ref 130–400)
GLUCOSE SERPL-MCNC: 122 MG/DL (ref 70–99)
HCT VFR BLD CALC: 37.1 % (ref 37–47)
HGB BLD-MCNC: 12.6 G/DL (ref 12–16)
INR PPP: 2 (ref 0.9–1.1)
MCH RBC QN AUTO: 30 PG (ref 25–34)
MCHC RBC AUTO-ENTMCNC: 34 G/DL (ref 32–36)
MCV RBC AUTO: 88.3 FL (ref 80–100)
PMV BLD AUTO: 9.1 FL (ref 7.4–10.4)
POTASSIUM SERPL-SCNC: 3.3 MMOL/L (ref 3.5–5.1)
RED CELL DISTRIBUTION WIDTH CV: 14.3 % (ref 11.5–14.5)
RED CELL DISTRIBUTION WIDTH SD: 46.3 FL (ref 36.4–46.3)
SODIUM SERPL-SCNC: 131 MMOL/L (ref 136–145)
WBC # BLD AUTO: 9.23 K/UL (ref 4.8–10.8)

## 2018-05-16 RX ADMIN — LIDOCAINE SCH PATCH: 50 PATCH CUTANEOUS at 09:13

## 2018-05-16 RX ADMIN — OXYCODONE HYDROCHLORIDE PRN MG: 5 TABLET ORAL at 05:06

## 2018-05-16 RX ADMIN — STANDARDIZED SENNA CONCENTRATE SCH MG: 8.6 TABLET ORAL at 09:12

## 2018-05-16 RX ADMIN — Medication SCH MG: at 09:12

## 2018-05-16 RX ADMIN — Medication SCH TAB: at 09:12

## 2018-05-16 RX ADMIN — WARFARIN SODIUM SCH MG: 7.5 TABLET ORAL at 15:44

## 2018-05-16 RX ADMIN — POTASSIUM CHLORIDE SCH MEQ: 1500 TABLET, EXTENDED RELEASE ORAL at 20:46

## 2018-05-16 RX ADMIN — DOCUSATE SODIUM SCH MG: 100 CAPSULE, LIQUID FILLED ORAL at 20:46

## 2018-05-16 RX ADMIN — OXYCODONE HYDROCHLORIDE AND ACETAMINOPHEN PRN TAB: 5; 325 TABLET ORAL at 11:00

## 2018-05-16 RX ADMIN — Medication SCH EA: at 20:47

## 2018-05-16 RX ADMIN — TIMOLOL MALEATE SCH DROPS: 5 SOLUTION OPHTHALMIC at 09:13

## 2018-05-16 RX ADMIN — DOCUSATE SODIUM SCH MG: 100 CAPSULE, LIQUID FILLED ORAL at 09:12

## 2018-05-16 RX ADMIN — POTASSIUM CHLORIDE SCH MEQ: 1500 TABLET, EXTENDED RELEASE ORAL at 09:00

## 2018-05-16 RX ADMIN — Medication SCH INTER.UNIT: at 09:13

## 2018-05-16 RX ADMIN — OXYCODONE HYDROCHLORIDE AND ACETAMINOPHEN PRN TAB: 5; 325 TABLET ORAL at 23:52

## 2018-05-16 RX ADMIN — OXYCODONE HYDROCHLORIDE AND ACETAMINOPHEN PRN TAB: 5; 325 TABLET ORAL at 15:45

## 2018-05-16 RX ADMIN — HYDROCHLOROTHIAZIDE SCH MG: 25 TABLET ORAL at 09:12

## 2018-05-16 NOTE — HOSPITALIST PROGRESS NOTE
Hospitalist Progress Note


Date of Service


May 16, 2018.





Subjective


Pt evaluation today including:  conversation w/ patient, physical exam, chart 

review, lab review, review of inpatient medication list


Patient seen and evaluated. Called the  as she was concerned that a 

provider has not seen her while hospitalized.


Stopped in the AM and she states she remembers me and was able to call me by my 

first name without introduction. Did again introduce our service for any 

clarification.


Patient reporting pain is doing better but her biggest concern is numbness/

tingling in the hand. Orthopedics was in to evaluated.


Patient with less emotionally labile today. Did change to Percocet to get some 

anti-inflammatory effects from Tylenol. Plan is still for rehab at [facility].


Did ask the patient if she would like me to update any family members and she 

stated it was not necessary as she didn't want to bother them.





   Constitutional:  No fever, No chills


   Respiratory:  No cough, No shortness of breath


   Cardiovascular:  No chest pain


   Abdomen:  No pain, No nausea, No vomiting, No diarrhea


   Musculoskeletal:  + joint pain (improving RUE pain)


   Female :  No dysuria


   Neurologic:  + numbness/tingling (RUE)


   Heme:  No abnormal bleeding/bruising





Medications





Current Inpatient Medications








 Medications


  (Trade)  Dose


 Ordered  Sig/Hanane


 Route  Start Time


 Stop Time Status Last Admin


Dose Admin


 


 Polyethylene


  (Miralax Powder


 Packet)  17 gm  DAILY  PRN


 PO  5/13/18 23:00


 6/12/18 22:59  5/16/18 08:10


17 GM


 


 Ondansetron HCl


  (Zofran Inj)  4 mg  Q6H  PRN


 IV  5/13/18 22:15


 6/12/18 22:14   


 


 


 Docusate Sodium


  (coLACE CAP)  100 mg  BID


 PO  5/14/18 09:00


 6/13/18 08:59  5/16/18 09:12


100 MG


 


 Calcium/Vitamin D


  (Caltrate Plus


 Tab)  1 tab  DAILY


 PO  5/14/18 09:00


 6/13/18 08:59  5/16/18 09:12


1 TAB


 


 Cholecalciferol


  (Vitamin D Tab)  1,000


 inter.unit  QAM


 PO  5/14/18 09:00


 6/13/18 08:59  5/16/18 09:13


1,000 INTER.UNIT


 


 Hydrochlorothiazide


  (Hydrochlorothiazide


 Tab)  12.5 mg  QAM


 PO  5/14/18 09:00


 6/13/18 08:59  5/16/18 09:12


12.5 MG


 


 Timolol Maleate


  (Timoptic 0.25%


 Oph Soln)  1 drops  QAM


 OPB  5/14/18 09:00


 6/13/18 08:59  5/16/18 09:13


1 DROPS


 


 Warfarin Sodium


  (Coumadin Tab)  5 mg  SuTuThSa@1600


 PO  5/15/18 16:00


 6/14/18 15:59  5/15/18 17:23


5 MG


 


 Warfarin Sodium


  (Coumadin Tab)  7.5 mg  MoWeFr@1600


 PO  5/14/18 16:00


 6/13/18 15:59  5/14/18 16:07


7.5 MG


 


 Loratadine


  (Claritin Tab)  10 mg  QAM


 PO  5/14/18 09:00


 6/13/18 08:59  5/16/18 09:12


10 MG


 


 Morphine Sulfate


  (MoRPHine


 SULFATE INJ)  1 mg  Q2HWA  PRN


 IV  5/13/18 22:15


 5/27/18 22:14  5/15/18 03:12


1 MG


 


 Miscellaneous


  (Iv Fluids


 Completed)  1 ea  PRN  PRN


 N/A  5/13/18 23:30


 5/13/19 23:29   


 


 


 Menthol


  (Nice Tiffany)  1 tiffany  PRN  PRN


 TIFFANY  5/14/18 01:15


 6/13/18 01:14   


 


 


 Lidocaine


  (Lidoderm Patch


 5%)  1 patch  QAM


 TD  5/15/18 09:00


 6/14/18 08:59  5/16/18 09:13


1 PATCH


 


 Miscellaneous


  (Remove Lidoderm


 Patch)  1 ea  DAILY@21


 N/A  5/14/18 21:00


 6/13/18 20:59  5/15/18 21:16


1 EA


 


 Diphenhydramine


 HCl


  (Benadryl Inj)  25 mg  HS


 IV  5/14/18 21:00


 6/13/18 20:59  5/15/18 21:16


25 MG


 


 Potassium Chloride


  (Klor-Con Tab)  20 meq  BID


 PO  5/15/18 09:00


 6/14/18 08:59  5/15/18 21:23


20 MEQ


 


 Senna


  (Senokot Tab)  17.2 mg  QAM


 PO  5/16/18 09:00


 6/15/18 08:59  5/16/18 09:12


17.2 MG


 


 Oxycodone/


 Acetaminophen


  (Percocet


 5-325mg Tab)  one tablet


 for pain


 1-5 ...  Q4H  PRN


 PO  5/16/18 08:15


 5/30/18 08:14  5/16/18 11:00


2 TAB











Objective


Vital Signs











  Date Time  Temp Pulse Resp B/P (MAP) Pulse Ox O2 Delivery O2 Flow Rate FiO2


 


5/16/18 07:58 37.2 75 16 118/80 (93) 93 Room Air  


 


5/16/18 07:45      Room Air  


 


5/15/18 23:36      Room Air  


 


5/15/18 22:48 37.0 88 16 103/70 (81) 96 Room Air  


 


5/15/18 16:00      Room Air  


 


5/15/18 15:59 36.8 85 20 140/88 (105) 95 Room Air  











Physical Exam


General Appearance:  WD/WN, no apparent distress


Eyes:  sclerae normal


ENT:  hearing grossly normal


Neck:  supple, no JVD, trachea midline


Respiratory/Chest:  lungs clear, normal breath sounds, no respiratory distress, 

no accessory muscle use


Cardiovascular:  + irregularly irregular


Abdomen:  normal bowel sounds, non tender, soft


Extremities:  no calf tenderness, + swelling (b/l lower extremity non-pitting 

edema), + pertinent finding (Movoment intact to elbow and wrist/fingers; + 

radial pulse; immediate cap refill)


Neurologic/Psychiatric:  alert, oriented x 3


Skin:  normal color, warm/dry





Laboratory Results





Last 24 Hours








Test


  5/16/18


05:21


 


White Blood Count 9.23 K/uL 


 


Red Blood Count 4.20 M/uL 


 


Hemoglobin 12.6 g/dL 


 


Hematocrit 37.1 % 


 


Mean Corpuscular Volume 88.3 fL 


 


Mean Corpuscular Hemoglobin 30.0 pg 


 


Mean Corpuscular Hemoglobin


Concent 34.0 g/dl 


 


 


RDW Standard Deviation 46.3 fL 


 


RDW Coefficient of Variation 14.3 % 


 


Platelet Count 197 K/uL 


 


Mean Platelet Volume 9.1 fL 


 


Sodium Level 131 mmol/L 


 


Potassium Level 3.3 mmol/L 


 


Chloride Level 94 mmol/L 


 


Carbon Dioxide Level 28 mmol/L 


 


Anion Gap 9.0 mmol/L 


 


Blood Urea Nitrogen 14 mg/dl 


 


Creatinine 0.88 mg/dl 


 


Est Creatinine Clear Calc


Drug Dose 59.3 ml/min 


 


 


Estimated GFR (


American) 71.4 


 


 


Estimated GFR (Non-


American 61.6 


 


 


BUN/Creatinine Ratio 16.0 


 


Random Glucose 122 mg/dl 


 


Calcium Level 8.4 mg/dl 











Assessment and Plan


83yo C female s/p mechanical fall with resultant right impacted humerus 

fracture.  Patient requiring large doses of pain medication for comfort.





R Humerus Fx: STABLE


- Pain is improving today and most complaint is of numbness/tingling


- Cervical XR completed and reviewed - degenerative changes at level of C4-C5 

and C5-C6; no soft tissue abnormalities


- Change to Percocet to get the added anti-inflammatory effect of Tylenol; 

Continue Lidoderm; Ice/Heat


- Continue Sling but may remove when resting to allow for ROM exercises; 


- Bowel regimen with Miralax PRN and Senna


- Ortho following - plan on outpatient F/U - conservative measures at this time


   -- F/U outpatient in 1-2 weeks with Dr. Vasques





HTN: Better Controlled:


- HCTZ 12.5 mg daily





JANEY on BiPAP: May use own machine





Persistent Atrial Fibrillation: Rate Controlled:


- Warfarin daily - Continue to trend


- No rate control medication however has been controlled





DVT Prophylaxis: Coumadin





Code Status: FULL RESUSCITATION





Disposition: Needs 3 midnight stay


- Referral to [facility] - possibly tomorrow pending bed availability


- PT/OT evaluations - hopefully with improved pain control cooperation will be 

better


Continued [facility] stay due to:  inadequate oral pain control, ambulation 

difficulties


Discharge planning:  skilled nursing facility

## 2018-05-16 NOTE — ORTHOPEDIC PROGRESS NOTE
Orthopedic Progress Note


Date of Service


May 16, 2018.





Subjective


Reports: complaints (Right arm numbness, lower neck pain, fatigue), Denies: 

chest pain, SOB, nausea / vomiting, light headedness, calf pain





Objective


calves soft nontender, N/V intact, capillary refill less than 2 sec., A&O x3, 

CMS intact


No pain at cervical spine with palpation. Full motion of cervical spine. Pain 

with palpation of the trapezius and rhomboid on the Right. No pain on the left.


Intact motor function to the fingers, thumb, wrist, elbow. Any attempt at 

shoulder motion causes shoulder pain. She is able to flex and extend her wrist 

and elbow, and supinate/pronate without pain. Able to extend thumb, make a fist

, and cross her fingers. Sensation is intact across all dermatomes by light 

touch.











  Date Time  Temp Pulse Resp B/P (MAP) Pulse Ox O2 Delivery O2 Flow Rate FiO2


 


5/16/18 07:58 37.2 75 16 118/80 (93) 93 Room Air  


 


5/15/18 23:36      Room Air  


 


5/15/18 22:48 37.0 88 16 103/70 (81) 96 Room Air  


 


5/15/18 16:00      Room Air  


 


5/15/18 15:59 36.8 85 20 140/88 (105) 95 Room Air  








Laboratory Results 24 Hours:











Test


  5/16/18


05:21


 


Hematocrit 37.1 % 


 


Hemoglobin 12.6 g/dL 











Assessment & Plan


Assessment:


HD #4, Right proximal humerus fracture, non-displaced. Right posterior shoulder 

pain


Plan:


Continue with sling, ice. Sling removed for breakfast.


PT/OT:  only work on ROM elbow to hand RUE. Bed to chair


Continue pain control.


Continue care per primary service.


Please recall if any orthopaedic issues.  F/U at [facility] in 1

-2 weeks.


Cervical spine xray ordered due to fall. Posterior shoulder symptoms likely 

related to fall








Discharge Planning


Discharge Planning:  uncertain


Pain Management:  Percocet


DVT Prophylaxis:  TEDs

## 2018-05-16 NOTE — DIAGNOSTIC IMAGING REPORT
C-SPINE ROUTINE 4 OR 5 VIEWS



CLINICAL HISTORY: neck pain    



COMPARISON STUDY:  No previous studies for comparison.



FINDINGS: The prevertebral soft tissues are normal. There are multilevel

degenerative changes with disc space narrowing most pronounced the C4-5 and

C5-C6 levels. Although the oblique films are suboptimally positioned, there is

suspected bilateral foraminal encroachment most pronounced at the C4-5 and C5-6

levels



IMPRESSION:  Degenerative changes most pronounced the C4-5 and C5-6 levels. 









Electronically signed by:  Sergey Tidwell M.D.

5/16/2018 9:46 AM



Dictated Date/Time:  5/16/2018 9:45 AM

## 2018-05-17 VITALS
HEART RATE: 87 BPM | OXYGEN SATURATION: 98 % | SYSTOLIC BLOOD PRESSURE: 110 MMHG | DIASTOLIC BLOOD PRESSURE: 82 MMHG | TEMPERATURE: 97.88 F

## 2018-05-17 VITALS
OXYGEN SATURATION: 94 % | TEMPERATURE: 98.06 F | HEART RATE: 76 BPM | DIASTOLIC BLOOD PRESSURE: 82 MMHG | SYSTOLIC BLOOD PRESSURE: 112 MMHG

## 2018-05-17 VITALS
DIASTOLIC BLOOD PRESSURE: 82 MMHG | HEART RATE: 76 BPM | TEMPERATURE: 98.06 F | OXYGEN SATURATION: 94 % | SYSTOLIC BLOOD PRESSURE: 112 MMHG

## 2018-05-17 VITALS — OXYGEN SATURATION: 98 %

## 2018-05-17 LAB
BUN SERPL-MCNC: 15 MG/DL (ref 7–18)
CALCIUM SERPL-MCNC: 8.8 MG/DL (ref 8.5–10.1)
CO2 SERPL-SCNC: 30 MMOL/L (ref 21–32)
CREAT SERPL-MCNC: 0.89 MG/DL (ref 0.6–1.2)
EOSINOPHIL NFR BLD AUTO: 206 K/UL (ref 130–400)
GLUCOSE SERPL-MCNC: 147 MG/DL (ref 70–99)
HCT VFR BLD CALC: 36.8 % (ref 37–47)
HGB BLD-MCNC: 12.6 G/DL (ref 12–16)
INR PPP: 2.7 (ref 0.9–1.1)
MCH RBC QN AUTO: 30.1 PG (ref 25–34)
MCHC RBC AUTO-ENTMCNC: 34.2 G/DL (ref 32–36)
MCV RBC AUTO: 88 FL (ref 80–100)
PMV BLD AUTO: 9.2 FL (ref 7.4–10.4)
POTASSIUM SERPL-SCNC: 3.7 MMOL/L (ref 3.5–5.1)
RED CELL DISTRIBUTION WIDTH CV: 14.3 % (ref 11.5–14.5)
RED CELL DISTRIBUTION WIDTH SD: 46.1 FL (ref 36.4–46.3)
SODIUM SERPL-SCNC: 132 MMOL/L (ref 136–145)
WBC # BLD AUTO: 8.13 K/UL (ref 4.8–10.8)

## 2018-05-17 RX ADMIN — Medication SCH TAB: at 09:21

## 2018-05-17 RX ADMIN — WARFARIN SODIUM SCH MG: 5 TABLET ORAL at 15:22

## 2018-05-17 RX ADMIN — Medication SCH MG: at 09:20

## 2018-05-17 RX ADMIN — DOCUSATE SODIUM SCH MG: 100 CAPSULE, LIQUID FILLED ORAL at 09:00

## 2018-05-17 RX ADMIN — STANDARDIZED SENNA CONCENTRATE SCH MG: 8.6 TABLET ORAL at 09:00

## 2018-05-17 RX ADMIN — HYDROCHLOROTHIAZIDE SCH MG: 25 TABLET ORAL at 09:21

## 2018-05-17 RX ADMIN — OXYCODONE HYDROCHLORIDE AND ACETAMINOPHEN PRN TAB: 5; 325 TABLET ORAL at 10:09

## 2018-05-17 RX ADMIN — Medication SCH INTER.UNIT: at 09:22

## 2018-05-17 RX ADMIN — OXYCODONE HYDROCHLORIDE AND ACETAMINOPHEN PRN TAB: 5; 325 TABLET ORAL at 05:36

## 2018-05-17 RX ADMIN — LIDOCAINE SCH PATCH: 50 PATCH CUTANEOUS at 09:24

## 2018-05-17 RX ADMIN — POTASSIUM CHLORIDE SCH MEQ: 1500 TABLET, EXTENDED RELEASE ORAL at 09:20

## 2018-05-17 RX ADMIN — TIMOLOL MALEATE SCH DROPS: 5 SOLUTION OPHTHALMIC at 09:22

## 2018-05-17 NOTE — DISCHARGE INSTRUCTIONS
Discharge Instructions


Date of Service


May 17, 2018.





Admission


Reason for Admission:  Humerus Fracture





Discharge


Discharge Diagnosis / Problem:  Right Humerus Fracture





Discharge Goals


Goal(s):  Decrease discomfort, Improve function, Increase independence





Activity Recommendations


Activity Level:  Assistance Required


Therapies:  Physical Therapy, Occupational Therapy





.





Additional Information


Patient informed of condition:  Yes


Advance Directives:  Yes


DNR:  No


Level of Care:  Skilled


Communicable Disease:  No


Prognosis:  Stable





Instructions / Follow-Up


Instructions / Follow-Up


83yo  female s/p mechanical fall with resultant right impacted humerus 

fracture.  Patient requiring large doses of pain medication for comfort.





R Humerus Fx - Conservative Treatment: STABLE


- Pain control is improving and having some intermittent numbness/tingling into 

that arm; Improves with ICE to the neck


- Cervical XR with no acute findings but does have some degenerative changes in 

C4-6 region


- Continue Percocet 1-2 tabs PRN; Lidoderm patch; and Ice/Heat as necessary


- Continue sling but may remove this for ROM exercise or when at rest


- Continue bowel regimen with Miralax PRN and Senna daily; May need to schedule 

Miralax if constipation becomes bothersome


- Needs to F/U with Dr. Vasques (orthopedics) in 1-2 weeks


   -- Recommend ROM to elbow and hand of R extremity





HTN:


- HCTZ 12.5 mg daily - does have stable hyponatremia at 132. Can be monitored





JANEY on BiPAP: Has her own machine





Persistent Atrial Fibrillation: Rate Controlled:


- Warfarin daily - INR currently 2.7 and has been very stable during admission. 

Recommend repeat check in 2-3 days and adjustments as necessary


- No rate control medication however has been controlled





DVT Prophylaxis: Coumadin





Code Status: FULL RESUSCITATION





Disposition:


- F/U with Dr. Vasques in 1-2 weeks


- F/U with PCP in next 7-10 days if possible





Current Hospital Diet


Patient's current hospital diet: Regular Diet





Discharge Diet


Recommended Diet:  Regular Diet





Pending Studies


Studies pending at discharge:  no





Medical Emergencies








.


Who to Call and When:





Medical Emergencies:  If at any time you feel your situation is an emergency, 

please call 911 immediately.





.





Non-Emergent Contact


Non-Emergency issues call your:  Primary Care Provider


Call Non-Emergent contact if:  you have a fever, your pain is concerning you, 

you have any medication questions





.


.








"Provider Documentation" section prepared by Elaine Su.








.





Core Measure Problem


Core Measures:  None





[place] Drug Monitoring Program


Search Results:  patient reviewed within database, no issues identified

## 2018-05-17 NOTE — DISCHARGE SUMMARY
Discharge Summary


Date of Service


May 17, 2018.





Discharge Summary


Admission Date:


May 14, 2018 at 11:28


Discharge Date:  May 17, 2018


Discharge Disposition:  Skilled nursing facility


Principal Diagnosis:  Right Humerus Fx 2/2 Mechanical Fall


Problems/Secondary Diagnoses:


Medical Problems:


1.  Idiopathic Angioedema


2.  HTN


3.  JANEY


4. Breast CA


5. Osteoarthritis


6. Hyperlipidemia


7. Aortic Aneurysm





Past Surgical:


1. Left Breast Lumpectomy


2. Left Hip


3. Left Knee


Immunizations:  


   Have You Had Influenza Vaccine:  Yes


   History of Tetanus Vaccine?:  Unknown


   History of Pneumococcal:  Yes


   History of Hepatitis B Vaccine:  Unknown


Procedures:


R HUMERUS MIN 2 VIEWS ROUTINE, R FOREARM 2 VIEWS ROUTINE





FINDINGS: There is an impacted right humeral neck fracture. There is mild medial


displacement of the humeral head in relation to the humeral shaft. No


dislocation within the right shoulder. The distal humerus, radius, and ulna are


intact. Mild soft tissue swelling within the forearm. The bones are osteopenic.





IMPRESSION:  1. An impacted right humeral neck fracture.


2. No fractures within the right radius or ulna. 





C-SPINE ROUTINE 4 OR 5 VIEWS





FINDINGS: The prevertebral soft tissues are normal. There are multilevel


degenerative changes with disc space narrowing most pronounced the C4-5 and


C5-C6 levels. Although the oblique films are suboptimally positioned, there is


suspected bilateral foraminal encroachment most pronounced at the C4-5 and C5-6


levels





IMPRESSION:  Degenerative changes most pronounced the C4-5 and C5-6 levels.


Consultations:


1. Orthopedics


2. PT/OT





Medication Reconciliation


New Medications:  


Lidocaine (Lidocaine) 1 Patch Tdsy


1 PATCH TD QAM for 14 Days, #14 PATCH





Oxycodone/Acetaminophen 5MG/325MG (Percocet 5MG/325MG)  Tab


1-2 TAB PO Q4H PRN for Pain for 3 Days, #36 TAB


PAIN


Senna (Senokot) 8.6 Mg Tab


17.2 MG PO QAM for 30 Days, #60 TAB





 


Continued Medications:  


Ascorbic Acid (Vitamin C) 500 Mg Tab


1 TAB PO QAM PRN for  





Azelastine Hcl (Astelin Nasal Spray) 200 Sprays/30 Ml Spray


2 SPRAYS DIEGO DAILY, BTL





Calcium Carbonate-Vitamin D W/ (Caltrate 600 Plus) 1 Tab Tab


1 TAB PO DAILY, TAB





Carboxymethylcellulose Sodium (Sterile Lubricant Drops) 0.7 % Ken


1 DROP OPB PRN


PRESERVATIVE FREE


Cholecalciferol (Vitamin D3) 1,000 Unit Tab


2 TAB PO QAM, TAB





Diphenhydramine Hcl (Benadryl Allergy) 25 Mg Cap


1 CAP PO HS


OCC BID IF NEEDED


Epinephrine (Epipen) 0.3 Mg/0.3 Ml Inj


0.3 MG IM UD PRN for ALLERGIC REACTION, BOX





Fluticasone Propionate (Nasal) (Flonase Allergy Relief) 50 Mcg/Act Spr


1 SPRAY DIEGO DAILY





Guaifenesin Ext Rel (Mucinex Ext Rel) 600 Mg Tab


600 MG PO Q12 PRN for prn, TAB





Hydrochlorothiazide (Hctz) 12.5 Mg Cap


12.5 MG PO QAM, TAB





Loratadine (Claritin) 10 Mg Tab


10 MG PO QAM, TAB





Polyethylene Glycol 3350 (Miralax) 1 Pow Pow


17 GM PO QPM PRN for PRN, GM





Psyllium (Metamucil) 48.57 % Pow


1 DOSE PO QPM PRN for PRN





Timolol Maleate (Ophth) (Timoptic 0.25% Oph) 0.25 % Sol


1 DROP OPB QAM, BTL





Triamcinolone Acet (Triamcinolone Acetonide) 45 Appln/15 Gm Oint


1 APPLN TOP BID PRN for prn, TUBE





Verapamil Hcl (Calan Sr Ext Rel) 180 Mg Tab


180 MG PO UD, TAB


uses as needed for when uses epipen


Warfarin Sodium (Coumadin) 5 Mg Tab


5 MG PO 4XWK, TAB


take on tues, thur, sat, sun


Warfarin Sodium (Coumadin) 5 Mg Tab


7.5 MG PO MWF, TAB





[Clotrimazole 1%] ()  


1 DOSE TOP BID





 


Discontinued Medications:  


Acetaminophen/Codeine (Tylenol W/Codeine #3) 300 Mg/30 Mg Tab


1 TAB PO Q6H PRN for N, TAB











Discharge Exam


ROS


Constitutional:  No fever, No chills


Respiratory:  No cough, No shortness of breath


Cardiovascular:  No chest pain


Abdomen:  No pain, No nausea, No vomiting, No diarrhea


Musculoskeletal:  + joint pain (improving RUE pain)


Female :  No dysuria


Neurologic:  + numbness/tingling - improving (RUE)


Heme:  No abnormal bleeding/bruising





PHYSICAL EXAMINATION


General Appearance:  WD/WN, no apparent distress


Eyes:  sclerae normal


ENT:  hearing grossly normal


Neck:  supple, no JVD, trachea midline


Respiratory/Chest:  lungs clear, normal breath sounds, no respiratory distress, 

no accessory muscle use


Cardiovascular:  + irregularly irregular


Abdomen:  normal bowel sounds, non tender, soft


Extremities:  no calf tenderness, + swelling (b/l lower extremity non-pitting 

edema), + pertinent finding (Movoment intact to elbow and wrist/fingers; + 

radial pulse; immediate cap refill)


Neurologic/Psychiatric:  alert, oriented x 3


Skin:  normal color, warm/dry





Hospital Course


ADMISSION: 83yo female presenting after mechanical fall.  Patient states that 

around 19:00 today she tripped on a rug at home leading to a fall on her right 

shoulder.  She denies head trauma/LOC, denies CP/palpitations/SOB/dizziness, 

denies urinary/fecal incontinence or evidence of seizure activity.  Patient was 

brought to the ER.  X-ray revealed an impacted right humeral neck fracture with 

no fractures within the right radius or ulna. Patient requiring large doses of 

pain medications, Fentanyl 100mcg and Dilaudid 2mg.  She is still experiencing 

severe arm pain 4-5/10 when sitting still and 10/10 with any movement or touch.

  





ER Course:  Dilaudid 2mg IV, NSS x 1 L, Fentanyl 100mcg, Zofran





HOSPITAL COURSE: 





R Humerus Fx - Conservative Treatment: STABLE


- Pain control is improving and having some intermittent numbness/tingling into 

that arm; Improves with ice/heat to the neck


- Continue Percocet 1-2 tabs PRN; Lidoderm patch; and Ice/Heat as necessary


- Continue sling but may remove this for ROM exercise or when at rest


- Continue bowel regimen with Miralax PRN and Senna daily; May need to schedule 

Miralax if constipation becomes bothersome


- Needs to F/U with Dr. Vasques (orthopedics) in 1-2 weeks


   -- Recommend ROM to elbow and hand of R extremity





HTN:


- HCTZ 12.5 mg daily - does have stable hyponatremia at 132. Can be monitored





JANEY on BiPAP: Has her own machine





Persistent Atrial Fibrillation: Rate Controlled:


- Warfarin daily - INR currently 2.7 and has been very stable during admission. 

Recommend repeat check in 2-3 days and adjustments as necessary


- No rate control medication however has been controlled





DVT Prophylaxis: Coumadin





Code Status: FULL RESUSCITATION





Disposition:


- F/U with Dr. Vasques in 1-2 weeks


Total Time Spent:  Greater than 30 minutes


This includes examination of the patient, discharge planning, medication 

reconciliation, and communication with other providers.





Discharge Instructions


Please refer to the electronic Patient Visit Report (Discharge Instructions) 

for additional information.





Additional Copies To


Julio Cesar Golden M.D.

## 2018-05-24 ENCOUNTER — HOSPITAL ENCOUNTER (OUTPATIENT)
Dept: HOSPITAL 45 - C.RDSM | Age: 83
Discharge: HOME | End: 2018-05-24
Attending: PHYSICIAN ASSISTANT
Payer: COMMERCIAL

## 2018-05-24 DIAGNOSIS — M25.551: ICD-10-CM

## 2018-05-24 DIAGNOSIS — R07.9: Primary | ICD-10-CM

## 2018-05-24 DIAGNOSIS — S42.201D: ICD-10-CM

## 2018-05-24 DIAGNOSIS — Z96.641: ICD-10-CM
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
## 2018-05-24 NOTE — DIAGNOSTIC IMAGING REPORT
R HIP UNILATERAL MIN 2 VIEWS



HISTORY:  82 years-old Female RIGHT HIP PAIN acute right hip pain



COMPARISON: None available



TECHNIQUE: 2 views of the right hip



FINDINGS: 

Bones appear moderately demineralized. Right hip arthroplasty demonstrates

satisfactory alignment. No periprosthetic fracture identified. No evidence of

hardware loosening. The imaged pelvis appears intact. Degenerative changes are

noted about the pubic symphysis and bilateral SI joints. Peripheral arterial

calcifications are also noted. Probable phleboliths about the pelvis.



IMPRESSION: 

1. No acute fracture or dislocation.

2. Right hip arthroplasty without evidence of hardware complication. 







The above report was generated using voice recognition software. It may contain

grammatical, syntax or spelling errors.







Electronically signed by:  Santos Finley M.D.

5/24/2018 12:11 PM



Dictated Date/Time:  5/24/2018 12:10 PM

## 2018-05-24 NOTE — DIAGNOSTIC IMAGING REPORT
R SHOULDER MIN 2 VIEWS



HISTORY:  82 years-old Female RIGHT SHOULDER FX acute right shoulder pain



COMPARISON: Right shoulder radiographs 5/14/2018



TECHNIQUE: 3 views of the right shoulder



FINDINGS: 

Unchanged alignment and appearance of the age indeterminate impacted fracture

about the right humeral anatomic neck and greater tuberosity. Moderate

osteoarthritis of the glenohumeral joint. The bones appear moderately

demineralized. Suggestion of mild soft tissue swelling about the right shoulder.

Atherosclerosis of the aorta.



IMPRESSION: 

1. Unchanged appearance and alignment of the right humeral fracture as above.

2. Mild right shoulder soft tissue swelling.







The above report was generated using voice recognition software. It may contain

grammatical, syntax or spelling errors.







Electronically signed by:  Santos Finley M.D.

5/24/2018 12:16 PM



Dictated Date/Time:  5/24/2018 12:14 PM

## 2018-05-24 NOTE — DIAGNOSTIC IMAGING REPORT
CHEST PA ONLY



HISTORY:  82 years-old Female RIGHT SIDED CHEST PAIN S/P FALL acute atypical

chest pain status post fall.



COMPARISON: Chest radiograph 5/13/2018



TECHNIQUE: PA view of the chest



FINDINGS: 

Cardiac silhouette is mildly enlarged, unchanged. Patient is slightly rotated to

the right. There is no pneumothorax, pleural effusion, focal airspace

consolidation or overt pulmonary edema. Demineralized appearance of the bones.

Impacted fracture about the proximal right humerus redemonstrated with mild soft

tissue swelling. Possible loose body within the right subscapularis recess. No

acute displaced rib fracture identified. Surgical clips project over the left

breast.



IMPRESSION: 

1. No acute process of the chest.

2. No acute displaced rib fracture or pneumothorax.

2. Unchanged alignment of the impacted fracture about the proximal right humerus

with mild soft tissue swelling. 







The above report was generated using voice recognition software. It may contain

grammatical, syntax or spelling errors.







Electronically signed by:  Santos Finley M.D.

5/24/2018 12:21 PM



Dictated Date/Time:  5/24/2018 12:19 PM

## 2018-07-26 ENCOUNTER — HOSPITAL ENCOUNTER (OUTPATIENT)
Dept: HOSPITAL 45 - C.RDSM | Age: 83
Discharge: HOME | End: 2018-07-26
Attending: PHYSICIAN ASSISTANT
Payer: COMMERCIAL

## 2018-07-26 DIAGNOSIS — S42.291D: Primary | ICD-10-CM
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
## 2018-07-26 NOTE — DIAGNOSTIC IMAGING REPORT
R SHOULDER MIN 2 VIEWS



CLINICAL HISTORY: RIGHT PROXIMAL HUMERUS FX fracture



COMPARISON: 6/28/2018



DISCUSSION: Impacted somewhat comminuted fracture of the right humeral neck as

well as head is again noted. This is superimposed upon moderate generalized

degenerative change.



No evidence of dislocation. No significant interval healing compared to the

prior study. There is no evidence for soft tissue swelling.



IMPRESSION: Fracture humeral head and neck unchanged in appearance on the prior

study. No significant interval healing. Alignment is stable.











The above report was generated using voice recognition software.  It may contain

grammatical, syntax or spelling errors.







Electronically signed by:  Evgeny Cardozo M.D.

7/26/2018 3:30 PM



Dictated Date/Time:  7/26/2018 3:28 PM

## 2020-10-26 NOTE — ANESTHESIOLOGY PROGRESS NOTE
Anesthesia Post Op Note


Date & Time


Aug 30, 2017 at 09:36





Vital Signs


Pain Intensity:  0





Vital Signs Past 12 Hours








  Date Time  Temp Pulse Resp B/P (MAP) Pulse Ox O2 Delivery O2 Flow Rate FiO2


 


8/30/17 09:27  62 16  100   


 


8/30/17 09:27  62 16     


 


8/30/17 09:26    137/73    


 


8/30/17 09:22 36.7       


 


8/30/17 09:22  63 20     


 


8/30/17 09:22  63 20  99   


 


8/30/17 09:21    142/77    


 


8/30/17 09:18  61 19  99   


 


8/30/17 09:18  62 19     


 


8/30/17 09:17    120/73    


 


8/30/17 09:13  61 14  100   


 


8/30/17 09:13  60 14     


 


8/30/17 09:12    120/81    


 


8/30/17 09:08  62 22  100   


 


8/30/17 09:08  61 22     


 


8/30/17 09:07  62 25     


 


8/30/17 09:07  63 25 129/71 100   


 


8/30/17 09:02  57 14  99   


 


8/30/17 09:02  59 14     


 


8/30/17 09:01    125/63    


 


8/30/17 08:59  62 16  100   


 


8/30/17 08:59  61 16     


 


8/30/17 08:57    111/62    


 


8/30/17 08:54  65 22     


 


8/30/17 08:54  65 22  100   


 


8/30/17 08:53  59 17     


 


8/30/17 08:53  61 17  100   


 


8/30/17 08:52    115/67    


 


8/30/17 08:48  63 17     


 


8/30/17 08:48  62 17  99   


 


8/30/17 08:47    97/66    


 


8/30/17 08:43  63 16  100   


 


8/30/17 08:43  63 16     


 


8/30/17 08:42  67 22  100   


 


8/30/17 08:42  65 22     


 


8/30/17 08:41    123/59    


 


8/30/17 08:37  66 25  99   


 


8/30/17 08:37  66 25     


 


8/30/17 08:36    123/63    


 


8/30/17 08:33    118/57    


 


8/30/17 08:32  66 19     


 


8/30/17 08:32  66 19  100   


 


8/30/17 08:27  64 11     


 


8/30/17 08:27  72 11  99   


 


8/30/17 08:26    118/67    


 


8/30/17 08:25    87/47    


 


8/30/17 08:23    88/45    


 


8/30/17 08:22 36.0 70 16 88/45 98 Nasal Cannula 2 


 


8/30/17 08:22  72 18     


 


8/30/17 08:22  97 18  97   


 


8/30/17 05:52 36.6 58 20 153/77 100 Room Air  











Notes


Mental Status:  alert / awake / arousable, participated in evaluation


Pt Amnestic to Procedure:  Yes


Nausea / Vomiting:  adequately controlled


Pain:  adequately controlled


Airway Patency, RR, SpO2:  stable & adequate


BP & HR:  stable & adequate


Hydration State:  stable & adequate


Anesthetic Complications:  no major complications apparent Relevant Problems   No relevant active problems       Anesthetic History   No history of anesthetic complications            Review of Systems / Medical History  Patient summary reviewed and pertinent labs reviewed    Pulmonary  Within defined limits                 Neuro/Psych   Within defined limits           Cardiovascular  Within defined limits                Exercise tolerance: >4 METS     GI/Hepatic/Renal  Within defined limits              Endo/Other  Within defined limits           Other Findings              Physical Exam    Airway  Mallampati: II  TM Distance: 4 - 6 cm  Neck ROM: normal range of motion   Mouth opening: Normal     Cardiovascular  Regular rate and rhythm,  S1 and S2 normal,  no murmur, click, rub, or gallop  Rhythm: regular  Rate: normal         Dental  No notable dental hx       Pulmonary  Breath sounds clear to auscultation               Abdominal  GI exam deferred       Other Findings            Anesthetic Plan    ASA: 2  Anesthesia type: epidural            Anesthetic plan and risks discussed with: Patient      Charting completed after epidural placement.